# Patient Record
Sex: MALE | Race: BLACK OR AFRICAN AMERICAN | NOT HISPANIC OR LATINO | Employment: OTHER | URBAN - METROPOLITAN AREA
[De-identification: names, ages, dates, MRNs, and addresses within clinical notes are randomized per-mention and may not be internally consistent; named-entity substitution may affect disease eponyms.]

---

## 2024-04-09 ENCOUNTER — APPOINTMENT (OUTPATIENT)
Dept: PRIMARY CARE | Facility: CLINIC | Age: 59
End: 2024-04-09
Payer: MEDICARE

## 2024-09-13 ENCOUNTER — APPOINTMENT (OUTPATIENT)
Dept: OPHTHALMOLOGY | Facility: CLINIC | Age: 59
End: 2024-09-13
Payer: MEDICARE

## 2025-05-05 ENCOUNTER — APPOINTMENT (OUTPATIENT)
Dept: RADIOLOGY | Facility: HOSPITAL | Age: 60
DRG: 683 | End: 2025-05-05
Payer: MEDICARE

## 2025-05-05 ENCOUNTER — CLINICAL SUPPORT (OUTPATIENT)
Dept: EMERGENCY MEDICINE | Facility: HOSPITAL | Age: 60
DRG: 683 | End: 2025-05-05
Payer: MEDICARE

## 2025-05-05 ENCOUNTER — HOSPITAL ENCOUNTER (INPATIENT)
Facility: HOSPITAL | Age: 60
LOS: 5 days | Discharge: HOME | DRG: 683 | End: 2025-05-10
Attending: STUDENT IN AN ORGANIZED HEALTH CARE EDUCATION/TRAINING PROGRAM | Admitting: INTERNAL MEDICINE
Payer: MEDICARE

## 2025-05-05 DIAGNOSIS — I95.89 HYPOTENSION DUE TO HYPOVOLEMIA: ICD-10-CM

## 2025-05-05 DIAGNOSIS — R93.3 ABNORMAL FINDINGS ON DIAGNOSTIC IMAGING OF OTHER PARTS OF DIGESTIVE TRACT: ICD-10-CM

## 2025-05-05 DIAGNOSIS — K62.89 RECTAL MASS: ICD-10-CM

## 2025-05-05 DIAGNOSIS — N17.9 ACUTE RENAL FAILURE, UNSPECIFIED ACUTE RENAL FAILURE TYPE: Primary | ICD-10-CM

## 2025-05-05 DIAGNOSIS — E86.1 HYPOTENSION DUE TO HYPOVOLEMIA: ICD-10-CM

## 2025-05-05 DIAGNOSIS — R53.1 WEAKNESS: ICD-10-CM

## 2025-05-05 LAB
ACANTHOCYTES BLD QL SMEAR: ABNORMAL
ALBUMIN SERPL BCP-MCNC: 3.5 G/DL (ref 3.4–5)
ALBUMIN SERPL BCP-MCNC: 3.8 G/DL (ref 3.4–5)
ALP SERPL-CCNC: 175 U/L (ref 33–120)
ALT SERPL W P-5'-P-CCNC: 6 U/L (ref 10–52)
AMPHETAMINES UR QL SCN: NORMAL
ANION GAP BLDV CALCULATED.4IONS-SCNC: 26 MMOL/L (ref 10–25)
ANION GAP SERPL CALC-SCNC: 29 MMOL/L (ref 10–20)
ANION GAP SERPL CALC-SCNC: 33 MMOL/L (ref 10–20)
APPEARANCE UR: ABNORMAL
AST SERPL W P-5'-P-CCNC: 6 U/L (ref 9–39)
ATRIAL RATE: 124 BPM
B-OH-BUTYR SERPL-SCNC: 1.98 MMOL/L (ref 0.02–0.27)
BARBITURATES UR QL SCN: NORMAL
BASE EXCESS BLDV CALC-SCNC: -11.6 MMOL/L (ref -2–3)
BASOPHILS # BLD MANUAL: 0 X10*3/UL (ref 0–0.1)
BASOPHILS NFR BLD MANUAL: 0 %
BENZODIAZ UR QL SCN: NORMAL
BILIRUB SERPL-MCNC: 0.5 MG/DL (ref 0–1.2)
BILIRUB UR STRIP.AUTO-MCNC: NEGATIVE MG/DL
BNP SERPL-MCNC: 54 PG/ML (ref 0–99)
BODY TEMPERATURE: 37 DEGREES CELSIUS
BUN SERPL-MCNC: 180 MG/DL (ref 6–23)
BUN SERPL-MCNC: 203 MG/DL (ref 6–23)
BURR CELLS BLD QL SMEAR: ABNORMAL
BZE UR QL SCN: NORMAL
CA-I BLDV-SCNC: 1.14 MMOL/L (ref 1.1–1.33)
CALCIUM SERPL-MCNC: 10.1 MG/DL (ref 8.6–10.6)
CALCIUM SERPL-MCNC: 9.5 MG/DL (ref 8.6–10.6)
CANNABINOIDS UR QL SCN: NORMAL
CARDIAC TROPONIN I PNL SERPL HS: 26 NG/L (ref 0–53)
CARDIAC TROPONIN I PNL SERPL HS: 29 NG/L (ref 0–53)
CHLORIDE BLDV-SCNC: 94 MMOL/L (ref 98–107)
CHLORIDE SERPL-SCNC: 93 MMOL/L (ref 98–107)
CHLORIDE SERPL-SCNC: 96 MMOL/L (ref 98–107)
CHLORIDE UR-SCNC: <15 MMOL/L
CHLORIDE/CREATININE (MMOL/G) IN URINE: NORMAL
CK SERPL-CCNC: 77 U/L (ref 0–325)
CO2 SERPL-SCNC: 12 MMOL/L (ref 21–32)
CO2 SERPL-SCNC: 12 MMOL/L (ref 21–32)
COLOR UR: ABNORMAL
CREAT SERPL-MCNC: 6.51 MG/DL (ref 0.5–1.3)
CREAT SERPL-MCNC: 7.93 MG/DL (ref 0.5–1.3)
CREAT UR-MCNC: 139.1 MG/DL (ref 20–370)
CREAT UR-MCNC: 139.1 MG/DL (ref 20–370)
EGFRCR SERPLBLD CKD-EPI 2021: 7 ML/MIN/1.73M*2
EGFRCR SERPLBLD CKD-EPI 2021: 9 ML/MIN/1.73M*2
EOSINOPHIL # BLD MANUAL: 0.1 X10*3/UL (ref 0–0.7)
EOSINOPHIL NFR BLD MANUAL: 0.8 %
ERYTHROCYTE [DISTWIDTH] IN BLOOD BY AUTOMATED COUNT: 15.8 % (ref 11.5–14.5)
EST. AVERAGE GLUCOSE BLD GHB EST-MCNC: 111 MG/DL
FENTANYL+NORFENTANYL UR QL SCN: NORMAL
GLUCOSE BLDV-MCNC: 111 MG/DL (ref 74–99)
GLUCOSE SERPL-MCNC: 125 MG/DL (ref 74–99)
GLUCOSE SERPL-MCNC: 92 MG/DL (ref 74–99)
GLUCOSE UR STRIP.AUTO-MCNC: NORMAL MG/DL
HAPTOGLOB SERPL NEPH-MCNC: 389 MG/DL (ref 30–200)
HBA1C MFR BLD: 5.5 % (ref ?–5.7)
HCO3 BLDV-SCNC: 13.3 MMOL/L (ref 22–26)
HCT VFR BLD AUTO: 34.9 % (ref 41–52)
HCT VFR BLD EST: 38 % (ref 41–52)
HGB BLD-MCNC: 12.1 G/DL (ref 13.5–17.5)
HGB BLDV-MCNC: 12.8 G/DL (ref 13.5–17.5)
HOLD SPECIMEN: 293
HYALINE CASTS #/AREA URNS AUTO: ABNORMAL /LPF
IMM GRANULOCYTES # BLD AUTO: 0.63 X10*3/UL (ref 0–0.7)
IMM GRANULOCYTES NFR BLD AUTO: 5.3 % (ref 0–0.9)
KETONES UR STRIP.AUTO-MCNC: NEGATIVE MG/DL
LACTATE BLDV-SCNC: 1.2 MMOL/L (ref 0.4–2)
LDH SERPL L TO P-CCNC: 446 U/L (ref 84–246)
LEUKOCYTE ESTERASE UR QL STRIP.AUTO: NEGATIVE
LIPASE SERPL-CCNC: 42 U/L (ref 9–82)
LYMPHOCYTES # BLD MANUAL: 0.45 X10*3/UL (ref 1.2–4.8)
LYMPHOCYTES NFR BLD MANUAL: 3.8 %
MAGNESIUM SERPL-MCNC: 2.72 MG/DL (ref 1.6–2.4)
MAGNESIUM SERPL-MCNC: 3.1 MG/DL (ref 1.6–2.4)
MCH RBC QN AUTO: 27.1 PG (ref 26–34)
MCHC RBC AUTO-ENTMCNC: 34.7 G/DL (ref 32–36)
MCV RBC AUTO: 78 FL (ref 80–100)
METAMYELOCYTES # BLD MANUAL: 0.1 X10*3/UL
METAMYELOCYTES NFR BLD MANUAL: 0.8 %
METHADONE UR QL SCN: NORMAL
MONOCYTES # BLD MANUAL: 0.64 X10*3/UL (ref 0.1–1)
MONOCYTES NFR BLD MANUAL: 5.4 %
MYELOCYTES # BLD MANUAL: 0.18 X10*3/UL
MYELOCYTES NFR BLD MANUAL: 1.5 %
NEUTS SEG # BLD MANUAL: 10.44 X10*3/UL (ref 1.2–7)
NEUTS SEG NFR BLD MANUAL: 87.7 %
NITRITE UR QL STRIP.AUTO: NEGATIVE
NRBC BLD-RTO: 0 /100 WBCS (ref 0–0)
OPIATES UR QL SCN: NORMAL
OXYCODONE+OXYMORPHONE UR QL SCN: NORMAL
OXYHGB MFR BLDV: 79.4 % (ref 45–75)
P AXIS: 32 DEGREES
P OFFSET: 205 MS
P ONSET: 153 MS
PCO2 BLDV: 27 MM HG (ref 41–51)
PCP UR QL SCN: NORMAL
PH BLDV: 7.3 PH (ref 7.33–7.43)
PH UR STRIP.AUTO: 5 [PH]
PHOSPHATE SERPL-MCNC: 10 MG/DL (ref 2.5–4.9)
PLATELET # BLD AUTO: 519 X10*3/UL (ref 150–450)
PO2 BLDV: 55 MM HG (ref 35–45)
POTASSIUM BLDV-SCNC: 5.5 MMOL/L (ref 3.5–5.3)
POTASSIUM SERPL-SCNC: 4.7 MMOL/L (ref 3.5–5.3)
POTASSIUM SERPL-SCNC: 5.2 MMOL/L (ref 3.5–5.3)
POTASSIUM UR-SCNC: 17 MMOL/L
POTASSIUM/CREAT UR-RTO: 12 MMOL/G CREAT
PR INTERVAL: 128 MS
PROT SERPL-MCNC: 7.8 G/DL (ref 6.4–8.2)
PROT SERPL-MCNC: 9.5 G/DL (ref 6.4–8.2)
PROT UR STRIP.AUTO-MCNC: ABNORMAL MG/DL
PROT UR-ACNC: 71 MG/DL (ref 5–25)
Q ONSET: 217 MS
QRS COUNT: 20 BEATS
QRS DURATION: 90 MS
QT INTERVAL: 314 MS
QTC CALCULATION(BAZETT): 451 MS
QTC FREDERICIA: 399 MS
R AXIS: -45 DEGREES
RBC # BLD AUTO: 4.46 X10*6/UL (ref 4.5–5.9)
RBC # UR STRIP.AUTO: ABNORMAL MG/DL
RBC #/AREA URNS AUTO: ABNORMAL /HPF
RBC MORPH BLD: ABNORMAL
SAO2 % BLDV: 81 % (ref 45–75)
SODIUM BLDV-SCNC: 128 MMOL/L (ref 136–145)
SODIUM SERPL-SCNC: 132 MMOL/L (ref 136–145)
SODIUM SERPL-SCNC: 133 MMOL/L (ref 136–145)
SODIUM UR-SCNC: 53 MMOL/L
SODIUM/CREAT UR-RTO: 38 MMOL/G CREAT
SP GR UR STRIP.AUTO: 1.02
T AXIS: 86 DEGREES
T OFFSET: 374 MS
TOTAL CELLS COUNTED BLD: 130
URATE SERPL-MCNC: 21.4 MG/DL (ref 4–7.5)
UREA/CREAT UR-SRTO: 5.2 G/G CREAT
UROBILINOGEN UR STRIP.AUTO-MCNC: NORMAL MG/DL
UUN UR-MCNC: 724 MG/DL
VENTRICULAR RATE: 124 BPM
WBC # BLD AUTO: 11.9 X10*3/UL (ref 4.4–11.3)
WBC #/AREA URNS AUTO: ABNORMAL /HPF

## 2025-05-05 PROCEDURE — 96361 HYDRATE IV INFUSION ADD-ON: CPT

## 2025-05-05 PROCEDURE — 80307 DRUG TEST PRSMV CHEM ANLYZR: CPT

## 2025-05-05 PROCEDURE — 84540 ASSAY OF URINE/UREA-N: CPT

## 2025-05-05 PROCEDURE — 1200000002 HC GENERAL ROOM WITH TELEMETRY DAILY

## 2025-05-05 PROCEDURE — 84484 ASSAY OF TROPONIN QUANT: CPT

## 2025-05-05 PROCEDURE — 2500000004 HC RX 250 GENERAL PHARMACY W/ HCPCS (ALT 636 FOR OP/ED): Mod: JZ

## 2025-05-05 PROCEDURE — 83735 ASSAY OF MAGNESIUM: CPT

## 2025-05-05 PROCEDURE — 84165 PROTEIN E-PHORESIS SERUM: CPT

## 2025-05-05 PROCEDURE — 96374 THER/PROPH/DIAG INJ IV PUSH: CPT

## 2025-05-05 PROCEDURE — 81001 URINALYSIS AUTO W/SCOPE: CPT

## 2025-05-05 PROCEDURE — 99285 EMERGENCY DEPT VISIT HI MDM: CPT | Mod: 25 | Performed by: STUDENT IN AN ORGANIZED HEALTH CARE EDUCATION/TRAINING PROGRAM

## 2025-05-05 PROCEDURE — 71045 X-RAY EXAM CHEST 1 VIEW: CPT

## 2025-05-05 PROCEDURE — 2500000001 HC RX 250 WO HCPCS SELF ADMINISTERED DRUGS (ALT 637 FOR MEDICARE OP)

## 2025-05-05 PROCEDURE — 87040 BLOOD CULTURE FOR BACTERIA: CPT

## 2025-05-05 PROCEDURE — 83615 LACTATE (LD) (LDH) ENZYME: CPT

## 2025-05-05 PROCEDURE — 83521 IG LIGHT CHAINS FREE EACH: CPT

## 2025-05-05 PROCEDURE — 36415 COLL VENOUS BLD VENIPUNCTURE: CPT

## 2025-05-05 PROCEDURE — 71045 X-RAY EXAM CHEST 1 VIEW: CPT | Performed by: STUDENT IN AN ORGANIZED HEALTH CARE EDUCATION/TRAINING PROGRAM

## 2025-05-05 PROCEDURE — 84156 ASSAY OF PROTEIN URINE: CPT

## 2025-05-05 PROCEDURE — 71250 CT THORAX DX C-: CPT | Performed by: RADIOLOGY

## 2025-05-05 PROCEDURE — 85027 COMPLETE CBC AUTOMATED: CPT

## 2025-05-05 PROCEDURE — 84300 ASSAY OF URINE SODIUM: CPT

## 2025-05-05 PROCEDURE — 82435 ASSAY OF BLOOD CHLORIDE: CPT

## 2025-05-05 PROCEDURE — 82010 KETONE BODYS QUAN: CPT

## 2025-05-05 PROCEDURE — 93005 ELECTROCARDIOGRAM TRACING: CPT

## 2025-05-05 PROCEDURE — 83880 ASSAY OF NATRIURETIC PEPTIDE: CPT

## 2025-05-05 PROCEDURE — 83690 ASSAY OF LIPASE: CPT

## 2025-05-05 PROCEDURE — 84155 ASSAY OF PROTEIN SERUM: CPT

## 2025-05-05 PROCEDURE — 74176 CT ABD & PELVIS W/O CONTRAST: CPT | Performed by: RADIOLOGY

## 2025-05-05 PROCEDURE — 85007 BL SMEAR W/DIFF WBC COUNT: CPT

## 2025-05-05 PROCEDURE — 84550 ASSAY OF BLOOD/URIC ACID: CPT

## 2025-05-05 PROCEDURE — 74176 CT ABD & PELVIS W/O CONTRAST: CPT

## 2025-05-05 PROCEDURE — 86335 IMMUNFIX E-PHORSIS/URINE/CSF: CPT

## 2025-05-05 PROCEDURE — 83036 HEMOGLOBIN GLYCOSYLATED A1C: CPT

## 2025-05-05 PROCEDURE — 83605 ASSAY OF LACTIC ACID: CPT

## 2025-05-05 PROCEDURE — 82550 ASSAY OF CK (CPK): CPT

## 2025-05-05 PROCEDURE — 83010 ASSAY OF HAPTOGLOBIN QUANT: CPT

## 2025-05-05 PROCEDURE — 84100 ASSAY OF PHOSPHORUS: CPT

## 2025-05-05 RX ORDER — ATORVASTATIN CALCIUM 40 MG/1
40 TABLET, FILM COATED ORAL NIGHTLY
Status: DISCONTINUED | OUTPATIENT
Start: 2025-05-05 | End: 2025-05-10 | Stop reason: HOSPADM

## 2025-05-05 RX ORDER — ONDANSETRON HYDROCHLORIDE 2 MG/ML
4 INJECTION, SOLUTION INTRAVENOUS ONCE
Status: COMPLETED | OUTPATIENT
Start: 2025-05-05 | End: 2025-05-05

## 2025-05-05 RX ORDER — INSULIN LISPRO 100 [IU]/ML
0-5 INJECTION, SOLUTION INTRAVENOUS; SUBCUTANEOUS
Status: DISCONTINUED | OUTPATIENT
Start: 2025-05-06 | End: 2025-05-08

## 2025-05-05 RX ORDER — ACETAMINOPHEN 325 MG/1
975 TABLET ORAL ONCE
Status: COMPLETED | OUTPATIENT
Start: 2025-05-05 | End: 2025-05-05

## 2025-05-05 RX ORDER — HEPARIN SODIUM 5000 [USP'U]/ML
5000 INJECTION, SOLUTION INTRAVENOUS; SUBCUTANEOUS EVERY 8 HOURS SCHEDULED
Status: DISCONTINUED | OUTPATIENT
Start: 2025-05-05 | End: 2025-05-10 | Stop reason: HOSPADM

## 2025-05-05 RX ORDER — SPIRONOLACTONE 50 MG/1
50 TABLET, FILM COATED ORAL
COMMUNITY
Start: 2024-11-04

## 2025-05-05 RX ORDER — DEXTROSE 50 % IN WATER (D50W) INTRAVENOUS SYRINGE
25
Status: DISCONTINUED | OUTPATIENT
Start: 2025-05-05 | End: 2025-05-08

## 2025-05-05 RX ORDER — FERROUS SULFATE 325(65) MG
1 TABLET ORAL
COMMUNITY
Start: 2024-11-04

## 2025-05-05 RX ORDER — DEXTROSE 50 % IN WATER (D50W) INTRAVENOUS SYRINGE
12.5
Status: DISCONTINUED | OUTPATIENT
Start: 2025-05-05 | End: 2025-05-08

## 2025-05-05 RX ORDER — METOPROLOL SUCCINATE 100 MG/1
100 TABLET, EXTENDED RELEASE ORAL
COMMUNITY
Start: 2024-11-04

## 2025-05-05 RX ORDER — LOSARTAN POTASSIUM 100 MG/1
100 TABLET ORAL
COMMUNITY
Start: 2024-11-04

## 2025-05-05 RX ORDER — ATORVASTATIN CALCIUM 40 MG/1
40 TABLET, FILM COATED ORAL
COMMUNITY
Start: 2024-11-04

## 2025-05-05 RX ORDER — FERROUS SULFATE 325(65) MG
65 TABLET ORAL
Status: DISCONTINUED | OUTPATIENT
Start: 2025-05-06 | End: 2025-05-10 | Stop reason: HOSPADM

## 2025-05-05 RX ORDER — ACETAMINOPHEN 500 MG
125 TABLET ORAL
COMMUNITY
Start: 2024-11-04

## 2025-05-05 RX ORDER — NAPROXEN SODIUM 220 MG/1
81 TABLET, FILM COATED ORAL DAILY
Status: DISCONTINUED | OUTPATIENT
Start: 2025-05-05 | End: 2025-05-10 | Stop reason: HOSPADM

## 2025-05-05 RX ORDER — POLYETHYLENE GLYCOL 3350 17 G/17G
17 POWDER, FOR SOLUTION ORAL DAILY
Status: DISCONTINUED | OUTPATIENT
Start: 2025-05-05 | End: 2025-05-10 | Stop reason: HOSPADM

## 2025-05-05 RX ORDER — AMOXICILLIN 250 MG
2 CAPSULE ORAL 2 TIMES DAILY
Status: DISCONTINUED | OUTPATIENT
Start: 2025-05-05 | End: 2025-05-10 | Stop reason: HOSPADM

## 2025-05-05 RX ORDER — ASPIRIN 81 MG/1
81 TABLET ORAL
COMMUNITY
Start: 2024-11-04

## 2025-05-05 RX ORDER — LORATADINE 10 MG/1
10 TABLET ORAL DAILY PRN
COMMUNITY
Start: 2024-11-04

## 2025-05-05 RX ADMIN — ACETAMINOPHEN 975 MG: 325 TABLET, FILM COATED ORAL at 13:59

## 2025-05-05 RX ADMIN — ASPIRIN 81 MG CHEWABLE TABLET 81 MG: 81 TABLET CHEWABLE at 21:36

## 2025-05-05 RX ADMIN — SENNOSIDES AND DOCUSATE SODIUM 2 TABLET: 50; 8.6 TABLET ORAL at 21:43

## 2025-05-05 RX ADMIN — SODIUM BICARBONATE 75 ML/HR: 84 INJECTION, SOLUTION INTRAVENOUS at 21:38

## 2025-05-05 RX ADMIN — ATORVASTATIN CALCIUM 40 MG: 40 TABLET, FILM COATED ORAL at 21:43

## 2025-05-05 RX ADMIN — SODIUM CHLORIDE 1000 ML: 9 INJECTION, SOLUTION INTRAVENOUS at 13:50

## 2025-05-05 RX ADMIN — ONDANSETRON 4 MG: 2 INJECTION INTRAMUSCULAR; INTRAVENOUS at 13:49

## 2025-05-05 RX ADMIN — SODIUM CHLORIDE, SODIUM LACTATE, POTASSIUM CHLORIDE, AND CALCIUM CHLORIDE 1000 ML: 600; 310; 30; 20 INJECTION, SOLUTION INTRAVENOUS at 15:48

## 2025-05-05 ASSESSMENT — ENCOUNTER SYMPTOMS
FACIAL SWELLING: 0
PALPITATIONS: 0
NAUSEA: 1
HEADACHES: 0
VOMITING: 1
CONSTIPATION: 1
APPETITE CHANGE: 1
DIZZINESS: 0
FATIGUE: 1
RECTAL PAIN: 0
CHILLS: 0
ACTIVITY CHANGE: 1
ABDOMINAL PAIN: 0
FLANK PAIN: 1
HEMATURIA: 0
DIARRHEA: 0
LIGHT-HEADEDNESS: 0
SHORTNESS OF BREATH: 0
WEAKNESS: 1
ABDOMINAL DISTENTION: 0
FEVER: 0
DYSURIA: 0
DIFFICULTY URINATING: 0

## 2025-05-05 ASSESSMENT — COLUMBIA-SUICIDE SEVERITY RATING SCALE - C-SSRS
2. HAVE YOU ACTUALLY HAD ANY THOUGHTS OF KILLING YOURSELF?: NO
6. HAVE YOU EVER DONE ANYTHING, STARTED TO DO ANYTHING, OR PREPARED TO DO ANYTHING TO END YOUR LIFE?: NO
1. IN THE PAST MONTH, HAVE YOU WISHED YOU WERE DEAD OR WISHED YOU COULD GO TO SLEEP AND NOT WAKE UP?: NO

## 2025-05-05 ASSESSMENT — PAIN - FUNCTIONAL ASSESSMENT
PAIN_FUNCTIONAL_ASSESSMENT: 0-10
PAIN_FUNCTIONAL_ASSESSMENT: 0-10

## 2025-05-05 ASSESSMENT — PAIN SCALES - GENERAL
PAINLEVEL_OUTOF10: 8
PAINLEVEL_OUTOF10: 0 - NO PAIN

## 2025-05-05 ASSESSMENT — PAIN DESCRIPTION - LOCATION: LOCATION: BACK

## 2025-05-05 ASSESSMENT — PAIN DESCRIPTION - DESCRIPTORS: DESCRIPTORS: ACHING

## 2025-05-05 NOTE — PROGRESS NOTES
Patient was handed off to me from the previous team. For full history, physical, and prior ED course, please see previous provider note prior to patient handoff. This is an addendum to the record.    Briefly, this is a 59-year-old male with past medical history of CHF (EF 20% 2021), iron deficiency anemia, HTN, and hypercholesterolemia who presents to the ED for generalized weakness at time of signout, patient was pending completion of workup and disposition.  His blood work is notable for a BUN of 203 with a creatinine of 7 concerning for acute renal failure.  He is still making urine and received 2 L of IV fluids.  We obtained a CT without contrast which shows concern significantly enlarged liver with several lesions concerning for possible malignancy.  Admit patient for further workup and management.  Patient admitted in stable condition      Throughout the ED stay, the patient was monitored and re-examined for any changes in stability or symptomatology.    Pt seen and discussed with Dr. Maris Ware DO.  Emergency Medicine PGY-3

## 2025-05-05 NOTE — ED TRIAGE NOTES
Patient presents via [facility]. Patient states he has had increased weakness for 1 week and SOB on exertion. Pt able to speak in full sentences and has decreased appetite. Kaelyn N/V & constipation. Tachycardic during triage in 130s. No resent sick contacts.

## 2025-05-05 NOTE — ED PROVIDER NOTES
CC: Weakness, Gen     HPI:  Patient is a 59-year-old male with a past medical history of CHF (EF 20% 2021), iron deficiency anemia, HTN, and hypercholesterolemia who presents to the ED for generalized weakness.  Patient has noted generalized weakness over the past week.  Notes that he has been having dyspnea on exertion.  Also noting right flank pain.  States that he has had significantly decreased p.o. secondary to nausea.  Has had multiple episodes of nonbloody emesis.  Notes that he has been intermittently constipated and had a normal bowel movement this past morning.  Denies history of abdominal surgeries.  Denied fevers, chills, chest pain, headache, trauma, falls, dysuria, and extremity pain.    Limitations to history: None  Independent historian(s): Patient  Records Reviewed: Recent available ED and inpatient notes reviewed in EMR.    PMHx/PSHx:  Per HPI.   - has no past medical history on file.  - has no past surgical history on file.    Medications:  Reviewed in EMR. See EMR for complete list of medications and doses.    Allergies:  Patient has no known allergies.    Social History:  - Tobacco:  has no history on file for tobacco use.   - Alcohol:  has no history on file for alcohol use.   - Illicit Drugs:  has no history on file for drug use.     ROS:  Per HPI.       ???????????????????????????????????????????????????????????????  Triage Vitals:  T 36.3 °C (97.3 °F)  HR (!) 124  /70  RR 16  O2 98 %      Physical Exam  Vitals and nursing note reviewed.   Constitutional:       General: He is not in acute distress.  HENT:      Head: Normocephalic and atraumatic.      Nose: Nose normal.      Mouth/Throat:      Mouth: Mucous membranes are dry.   Eyes:      Conjunctiva/sclera: Conjunctivae normal.   Cardiovascular:      Rate and Rhythm: Regular rhythm. Tachycardia present.      Pulses: Normal pulses.   Pulmonary:      Effort: Pulmonary effort is normal. No respiratory distress.      Breath sounds:  Normal breath sounds.   Abdominal:      Palpations: Abdomen is soft.      Tenderness: There is no abdominal tenderness. There is right CVA tenderness.   Skin:     General: Skin is warm.   Neurological:      General: No focal deficit present.      Mental Status: He is alert.       ???????????????????????????????????????????????????????????????  Labs:   Labs Reviewed   BLOOD GAS VENOUS FULL PANEL UNSOLICITED - Abnormal       Result Value    POCT pH, Venous 7.30 (*)     POCT pCO2, Venous 27 (*)     POCT pO2, Venous 55 (*)     POCT SO2, Venous 81 (*)     POCT Oxy Hemoglobin, Venous 79.4 (*)     POCT Hematocrit Calculated, Venous 38.0 (*)     POCT Sodium, Venous 128 (*)     POCT Potassium, Venous 5.5 (*)     POCT Chloride, Venous 94 (*)     POCT Ionized Calicum, Venous 1.14      POCT Glucose, Venous 111 (*)     POCT Lactate, Venous 1.2      POCT Base Excess, Venous -11.6 (*)     POCT HCO3 Calculated, Venous 13.3 (*)     POCT Hemoglobin, Venous 12.8 (*)     POCT Anion Gap, Venous 26.0 (*)     Patient Temperature 37.0          Imaging:   No orders to display      MDM:  Patient is a 59-year-old male with a past medical history of CHF (EF 20% 2021), iron deficiency anemia, HTN, and hypercholesterolemia who presents to the ED for generalized weakness.  Patient tachycardic with a heart rate of 124 with soft BP at 101/70.  Physical exam findings significant for dehydration and right flank TTP.  Low clinical concern for dissection, pneumothorax, SBO, acute mesenteric ischemia, and sepsis.  IV fluids, Zofran, and Tylenol ordered.  Basic labs, cardiac workup, blood cultures, UA, CXR, CT PE, and CTAP ordered.  Please see ED course and disposition for remainder care.    ED Course:  ED Course as of 05/05/25 1444   Mon May 05, 2025   1252 EKG: Rate is 124, sinus rhythm, normal axis, no interval prolongation, no st elevation or depression.  No previous EKG for comparison.  Review of EKG does not show any signs of STEMI, complete heart  block, asystole, V-fib. []   1344 Bedside cardiac POCUS significant for mildly reduced EF and no pericardial effusion.  Thoracic POCUS without significant B-lines. IVC collapsible. []   1350 XR chest 1 view  CXR without an acute cardiopulmonary process. []   1419 CBC with mild leukocytosis and anemia. []      ED Course User Index  [] Bakari Gardner MD       Social Determinants Limiting Care:  None identified    Disposition:  Patient signed out to Dr. Ware in stable condition pending labs, imaging, and reevaluation.  Patient's most likely disposition is admission.    Bakari Gardner MD   Emergency Medicine PGY-3  [facility]    Comment: Please note this report has been produced using speech recognition software and may contain errors related to that system including errors in grammar, punctuation, and spelling as well as words and phrases that may be inappropriate.  If there are any questions or concerns please feel free to contact the dictating provider for clarification.    Procedures ? SmartLinks last updated 5/5/2025 1:13 PM        Bakari Gardner MD  Resident  05/05/25 7334     by the FDA; however such approval is not necessary.     HAPTOGLOBIN - Abnormal    Haptoglobin 389 (*)    LACTATE DEHYDROGENASE - Abnormal     (*)    KAPPA/LAMBDA FREE LIGHT CHAIN, SERUM - Abnormal    Ig [place] Free Light Chain 13.60 (*)     Ig Lambda Free Light Chain 9.72 (*)     Kappa/Lambda Ratio 1.40      Narrative:     Undetected antigen excess is a rare event but cannot be  excluded. If these free light chain results do not agree  with other clinical or laboratory findings, or if the  sample is from a patient that has previously demonstrated  antigen excess, the result must be checked by retesting  at a higher sample dilution. Results should always be  interpreted in conjunction with other laboratory tests  and clinical evidence; any anomalies should be discussed  with the testing laboratory.   SERUM PROTEIN ELECTROPHORESIS + IMMUNOFIXATION - Abnormal    Albumin 3.0 (*)     Alpha 1 Globulin 0.7 (*)     Alpha 2 Globulin 1.2 (*)     Beta Globulin 0.9      Gamma 1.9 (*)     Protein Electrophoresis Comment        Value: Hypoalbuminemia.     Increase in polyclonal gamma globulins.    Increased level of alpha 1 and 2 globulins.         Immunofixation Comment No monoclonal protein detected by immunofixation.      Path Review - Serum Protein Electrophoresis         Value: Reviewed and approved by MILAN BARRETO on 5/9/25 at 10:10 AM.        Path Review - Serum Immunofixation        Value: Reviewed and approved by MILAN BARRETO on 5/9/25 at 10:10 AM.       PROTEIN, URINE RANDOM UPE - Abnormal    Total Protein, Urine Random 71 (*)    URIC ACID - Abnormal    Uric Acid 21.4 (*)    RENAL FUNCTION PANEL - Abnormal    Glucose 136 (*)     Sodium 133 (*)     Potassium 6.1 (*)     Chloride 98      Bicarbonate 16 (*)     Anion Gap 25 (*)     Urea Nitrogen 187 (*)     Creatinine 5.74 (*)     eGFR 11 (*)     Calcium 8.7      Phosphorus 8.7 (*)     Albumin 3.0 (*)    RENAL FUNCTION PANEL - Abnormal    Glucose 138 (*)      Sodium 132 (*)     Potassium 6.6 (*)     Chloride 97 (*)     Bicarbonate 18 (*)     Anion Gap 24 (*)     Urea Nitrogen 176 (*)     Creatinine 5.23 (*)     eGFR 12 (*)     Calcium 8.7      Phosphorus 8.6 (*)     Albumin 3.1 (*)    BLOOD GAS VENOUS FULL PANEL - Abnormal    POCT pH, Venous 7.40      POCT pCO2, Venous 31 (*)     POCT pO2, Venous 60 (*)     POCT SO2, Venous 89 (*)     POCT Oxy Hemoglobin, Venous 86.8 (*)     POCT Hematocrit Calculated, Venous 34.0 (*)     POCT Sodium, Venous 133 (*)     POCT Potassium, Venous 4.5      POCT Chloride, Venous 100      POCT Ionized Calicum, Venous 1.17      POCT Glucose, Venous 138 (*)     POCT Lactate, Venous 1.0      POCT Base Excess, Venous -4.7 (*)     POCT HCO3 Calculated, Venous 19.2 (*)     POCT Hemoglobin, Venous 11.4 (*)     POCT Anion Gap, Venous 18.0      Patient Temperature 37.0      FiO2 21     PROSTATE SPECIFIC ANTIGEN - Abnormal    Prostate Specific AG 5.47 (*)     Narrative:     The FDA requires that the method used for PSA assay be reported to the physician. Values obtained with different assay methods must not be used interchangeably. This test was performed at [facility] using [employer] PSA method, which is a sandwich immunoassay using chemiluminescence for quantitation. The assay is approved for measurement of prostate-specific antigen (PSA) in serum and may be used in conjunction with a digital rectal examination in men 50 years and older as an aid in the detection of prostate cancer. 5 Alpha-reductase inhibitors (e.g., Proscar, Finasteride, Avodart, Dutasteride, and Sonali) for the treatment of BPH have been shown to lower PSA levels by an average of 50% after 6 months of treatment.        CANCER ANTIGEN 19-9 - Abnormal    Cancer AG 19-9 39.67 (*)     Narrative:     CA 19-9 testing is performed by chemiluminescent  immunoassay using the [employer]. Values obtained with different analytic methods cannot be  used  interchangeably.    Serum CA 19-9 measurement is indicated for the serial measurement of CA 19-9 to aid in the management of patients diagnosed with cancers of the exocrine pancreas. This assay is not intended for screening or diagnosis of cancer in the general population. The results must not be used as the sole means for clinical diagnosis or patient management decisions.    Patients known to be genotypically negative for the [place] blood group antigens will be unable to produce CA 19-9 antigen, even in malignant tissue. Phenotyping for the presence of the [place] antigen may be insufficient to  detect true [place] antigen negative individuals.    The results must not be used as the sole means for clinical diagnosis or patient management decisions.   RENAL FUNCTION PANEL - Abnormal    Glucose 133 (*)     Sodium 134 (*)     Potassium 4.2      Chloride 98      Bicarbonate 20 (*)     Anion Gap 20      Urea Nitrogen 165 (*)     Creatinine 4.44 (*)     eGFR 14 (*)     Calcium 9.6      Phosphorus 6.5 (*)     Albumin 3.3 (*)    MAGNESIUM - Abnormal    Magnesium 2.52 (*)    LACTATE DEHYDROGENASE - Abnormal     (*)    URIC ACID - Abnormal    Uric Acid 20.1 (*)    URIC ACID - Abnormal    Uric Acid 13.2 (*)    LACTATE DEHYDROGENASE - Abnormal     (*)    RENAL FUNCTION PANEL - Abnormal    Glucose 185 (*)     Sodium 134 (*)     Potassium 3.9      Chloride 100      Bicarbonate 21      Anion Gap 17      Urea Nitrogen 157 (*)     Creatinine 3.57 (*)     eGFR 19 (*)     Calcium 9.3      Phosphorus 5.3 (*)     Albumin 3.2 (*)    CBC WITH AUTO DIFFERENTIAL - Abnormal    WBC 9.1      nRBC 0.0      RBC 3.20 (*)     Hemoglobin 8.5 (*)     Hematocrit 26.0 (*)     MCV 81      MCH 26.6      MCHC 32.7      RDW 16.0 (*)     Platelets 368      Immature Granulocytes %, Automated 5.1 (*)     Immature Granulocytes Absolute, Automated 0.47      Narrative:     The previously reported component Neutrophils % is no longer being  reported.  The previously reported component Lymphocytes % is no longer being reported.  The previously reported component Monocytes % is no longer being reported.  The previously   reported component Eosinophils % is no longer being reported.  The previously reported component Basophils % is no longer being reported.  The previously reported component Absolute Neutrophils is no longer being reported.  The previously reported   component Absolute Lymphocytes is no longer being reported.  The previously reported component Absolute Monocytes is no longer being reported.  The previously reported component Absolute Eosinophils is no longer being reported.  The previously reported   component Absolute Basophils is no longer being reported.   RENAL FUNCTION PANEL - Abnormal    Glucose 86      Sodium 139      Potassium 4.1      Chloride 112 (*)     Bicarbonate 19 (*)     Anion Gap 12      Urea Nitrogen 99 (*)     Creatinine 1.91 (*)     eGFR 40 (*)     Calcium 6.9 (*)     Phosphorus 3.4      Albumin 2.4 (*)    RENAL FUNCTION PANEL - Abnormal    Glucose 96      Sodium 139      Potassium 4.4      Chloride 107      Bicarbonate 22      Anion Gap 14      Urea Nitrogen 92 (*)     Creatinine 1.65 (*)     eGFR 48 (*)     Calcium 8.9      Phosphorus 3.2      Albumin 3.3 (*)    URIC ACID - Abnormal    Uric Acid 1.9 (*)    IRON AND TIBC - Abnormal    Iron 48      UIBC 103 (*)     TIBC 151 (*)     % Saturation 32     FERRITIN - Abnormal    Ferritin 2,951 (*)    VITAMIN B12 - Abnormal    Vitamin B12 1,172 (*)    RENAL FUNCTION PANEL - Abnormal    Glucose 104 (*)     Sodium 141      Potassium 4.6      Chloride 107      Bicarbonate 24      Anion Gap 15      Urea Nitrogen 67 (*)     Creatinine 1.32 (*)     eGFR 62      Calcium 8.8      Phosphorus 2.5      Albumin 3.1 (*)    URIC ACID - Abnormal    Uric Acid <1.5 (*)    CBC WITH AUTO DIFFERENTIAL - Abnormal    WBC 11.0      nRBC 0.0      RBC 3.74 (*)     Hemoglobin 10.0 (*)     Hematocrit  31.2 (*)     MCV 83      MCH 26.7      MCHC 32.1      RDW 16.1 (*)     Platelets 449      Neutrophils % 80.1      Immature Granulocytes %, Automated 4.3 (*)     Lymphocytes % 10.2      Monocytes % 4.4      Eosinophils % 0.7      Basophils % 0.3      Neutrophils Absolute 8.84 (*)     Immature Granulocytes Absolute, Automated 0.47      Lymphocytes Absolute 1.13 (*)     Monocytes Absolute 0.48      Eosinophils Absolute 0.08      Basophils Absolute 0.03     MAGNESIUM - Abnormal    Magnesium 1.57 (*)    RENAL FUNCTION PANEL - Abnormal    Glucose 201 (*)     Sodium 138      Potassium 4.4      Chloride 106      Bicarbonate 23      Anion Gap 13      Urea Nitrogen 19      Creatinine 1.11      eGFR 76      Calcium 8.7      Phosphorus 1.8 (*)     Albumin 2.9 (*)    URIC ACID - Abnormal    Uric Acid <1.5 (*)    POCT GLUCOSE - Abnormal    POCT Glucose 134 (*)    POCT GLUCOSE - Abnormal    POCT Glucose 190 (*)    POCT GLUCOSE - Abnormal    POCT Glucose 112 (*)    POCT GLUCOSE - Abnormal    POCT Glucose 105 (*)    POCT GLUCOSE - Abnormal    POCT Glucose 103 (*)    POCT GLUCOSE - Abnormal    POCT Glucose 103 (*)    BLOOD GAS VENOUS FULL PANEL UNSOLICITED - Abnormal    POCT pH, Venous 7.30 (*)     POCT pCO2, Venous 27 (*)     POCT pO2, Venous 55 (*)     POCT SO2, Venous 81 (*)     POCT Oxy Hemoglobin, Venous 79.4 (*)     POCT Hematocrit Calculated, Venous 38.0 (*)     POCT Sodium, Venous 128 (*)     POCT Potassium, Venous 5.5 (*)     POCT Chloride, Venous 94 (*)     POCT Ionized Calicum, Venous 1.14      POCT Glucose, Venous 111 (*)     POCT Lactate, Venous 1.2      POCT Base Excess, Venous -11.6 (*)     POCT HCO3 Calculated, Venous 13.3 (*)     POCT Hemoglobin, Venous 12.8 (*)     POCT Anion Gap, Venous 26.0 (*)     Patient Temperature 37.0     MANUAL DIFFERENTIAL - Abnormal    Neutrophils %, Manual 87.7      Lymphocytes %, Manual 3.8      Monocytes %, Manual 5.4      Eosinophils %, Manual 0.8      Basophils %, Manual 0.0       Metamyelocytes %, Manual 0.8      Myelocytes %, Manual 1.5      Seg Neutrophils Absolute, Manual 10.44 (*)     Lymphocytes Absolute, Manual 0.45 (*)     Monocytes Absolute, Manual 0.64      Eosinophils Absolute, Manual 0.10      Basophils Absolute, Manual 0.00      Metamyelocytes Absolute, Manual 0.10      Myelocytes Absolute, Manual 0.18      Total Cells Counted 130      RBC Morphology See Below      [place] Cells Many      Acanthocytes Few     URINALYSIS MICROSCOPIC WITH REFLEX CULTURE - Abnormal    WBC, Urine 6-10 (*)     RBC, Urine 1-2      Hyaline Casts, Urine 3+ (*)    MANUAL DIFFERENTIAL - Abnormal    Neutrophils %, Manual 84.4      Lymphocytes %, Manual 7.8      Monocytes %, Manual 4.3      Eosinophils %, Manual 0.9      Basophils %, Manual 0.9      Myelocytes %, Manual 1.7      Seg Neutrophils Absolute, Manual 7.68 (*)     Lymphocytes Absolute, Manual 0.71 (*)     Monocytes Absolute, Manual 0.39      Eosinophils Absolute, Manual 0.08      Basophils Absolute, Manual 0.08      Myelocytes Absolute, Manual 0.15      Total Cells Counted 115      RBC Morphology See Below      Tam Cells Many      Acanthocytes Few     BLOOD CULTURE - Normal    Blood Culture No growth at 4 days -  FINAL REPORT     BLOOD CULTURE - Normal    Blood Culture No growth at 4 days -  FINAL REPORT     B-TYPE NATRIURETIC PEPTIDE - Normal    BNP 54      Narrative:        <100 pg/mL - Heart failure unlikely  100-299 pg/mL - Intermediate probability of acute heart                  failure exacerbation. Correlate with clinical                  context and patient history.    >=300 pg/mL - Heart Failure likely. Correlate with clinical                  context and patient history.     Biotin interference may cause falsely decreased results. Patients taking a Biotin dose of up to 5 mg/day should refrain from taking Biotin for 24 hours before sample  collection. Providers may contact their local laboratory for further information.   LIPASE - Normal     Lipase 42      Narrative:     Venipuncture immediately after or during the administration of Metamizole may lead to falsely low results. Testing should be performed immediately prior to Metamizole dosing.   SERIAL TROPONIN-INITIAL - Normal    Troponin I, High Sensitivity ([facility]) 26      Narrative:     Less than 99th percentile of normal range cutoff-  Female and children under 18 years old <35 ng/L; Male <54 ng/L: Negative  Repeat testing should be performed if clinically indicated.     Female and children under 18 years old  ng/L; Male  ng/L:  Consistent with possible cardiac damage and possible increased clinical   risk. Serial measurements may help to assess extent of myocardial damage.     >120 ng/L: Consistent with cardiac damage, increased clinical risk and  myocardial infarction. Serial measurements may help assess extent of   myocardial damage.      NOTE: Children less than 1 year old may have higher baseline troponin   levels and results should be interpreted in conjunction with the overall   clinical context.    NOTE: Troponin I testing is performed using a different   testing methodology at [facility] than at other   [facility]. Direct result comparisons should only   be made within the same method.     SERIAL TROPONIN, 1 HOUR - Normal    Troponin I, High Sensitivity ([facility]) 29      Narrative:     Less than 99th percentile of normal range cutoff-  Female and children under 18 years old <35 ng/L; Male <54 ng/L: Negative  Repeat testing should be performed if clinically indicated.     Female and children under 18 years old  ng/L; Male  ng/L:  Consistent with possible cardiac damage and possible increased clinical   risk. Serial measurements may help to assess extent of myocardial damage.     >120 ng/L: Consistent with cardiac damage, increased clinical risk and  myocardial infarction. Serial measurements may help assess extent of   myocardial damage.      NOTE:  Children less than 1 year old may have higher baseline troponin   levels and results should be interpreted in conjunction with the overall   clinical context.    NOTE: Troponin I testing is performed using a different   testing methodology at [facility] than at other   [facility]. Direct result comparisons should only   be made within the same method.     DRUG SCREEN, URINE WITH REFLEX TO CONFIRMATION - Normal    Amphetamine Screen, Urine Presumptive Negative      Barbiturate Screen, Urine Presumptive Negative      Benzodiazepines Screen, Urine Presumptive Negative      Cannabinoid Screen, Urine Presumptive Negative      Cocaine Metabolite Screen, Urine Presumptive Negative      Fentanyl Screen, Urine Presumptive Negative      Opiate Screen, Urine Presumptive Negative      Oxycodone Screen, Urine Presumptive Negative      PCP Screen, Urine Presumptive Negative      Methadone Screen, Urine Presumptive Negative      Narrative:     Drug screen results are presumptive and should not be used to assess   compliance with prescribed medication. Definitive confirmatory drug testing   has been added to this sample for any positive screen result and will be  reported separately.     Toxicology screening results are reported qualitatively. The concentration must  be greater than or equal to the cutoff to be reported as positive. The concentration   at which the screening test can detect an individual drug or metabolite varies.   The absence of expected drug(s) and/or drug metabolite(s) may indicate non-compliance,   inappropriate timing of specimen collection relative to drug administration, poor drug   absorption, diluted/adulterated urine, or limitations of testing. For medical purposes   only; not valid for forensic use.    Interpretive questions should be directed to the laboratory medical directors.   CREATINE KINASE - Normal    Creatine Kinase 77     PROTEIN, TOTAL SPE - Normal    Total Protein 7.8      GLUCOSE 6 PHOSPHATE DEHYDROGENASE QUALITATIVE SCREEN - Normal    G6PD, Qual Normal     MAGNESIUM - Normal    Magnesium 2.31     MAGNESIUM - Normal    Magnesium 1.71     MAGNESIUM - Normal    Magnesium 1.87     RETICULOCYTES - Normal    Retic % 1.2      Retic Absolute 0.039      Reticulocyte Hemoglobin 30      Immature Retic fraction 11.3     FOLATE - Normal    Folate, Serum >24.0      Narrative:     Low           <3.4  Borderline 3.4-5.0  Normal        >5.0    Patients receiving more than 5 mg/day of biotin may have interference in test results. A sample should be taken no sooner than eight hours after previous dose. Contact the testing laboratory for additional information.    POCT GLUCOSE - Normal    POCT Glucose 96     TROPONIN SERIES- (INITIAL, 1 HR)    Narrative:     The following orders were created for panel order Troponin I Series, High Sensitivity (0, 1 HR).  Procedure                               Abnormality         Status                     ---------                               -----------         ------                     Troponin I, High Sensiti...[[id number]]  Normal              Final result               Troponin, High Sensitivi...[[id number]]  Normal              Final result                 Please view results for these tests on the individual orders.   URINALYSIS WITH REFLEX CULTURE AND MICROSCOPIC    Narrative:     The following orders were created for panel order Urinalysis with Reflex Culture and Microscopic.  Procedure                               Abnormality         Status                     ---------                               -----------         ------                     Urinalysis with Reflex C...[[id number]]  Abnormal            Final result               Extra Urine Gray Tube[[id number]]                            Final result                 Please view results for these tests on the individual orders.   EXTRA URINE GRAY TUBE    Extra Tube 293     ELECTROLYTE PANEL, URINE     Sodium, Urine Random 53      Sodium/Creatinine Ratio 38      Potassium, Urine Random 17      Potassium/Creatinine Ratio 12      Chloride, Urine Random <15      Chloride/Creatinine Ratio        Creatinine, Urine Random 139.1     UREA NITROGEN, URINE RANDOM    Urea Nitrogen, Urine Random 724      Creatinine, Urine Random 139.1      Urea Nitrogen/Creatinine Ratio 5.2     HEMOGLOBIN A1C    Hemoglobin A1C 5.5      Estimated Average Glucose 111      Narrative:     Diagnosis of Diabetes-Adults  Non-Diabetic: < or = 5.6%  Increased risk for developing diabetes: 5.7-6.4%  Diagnostic of diabetes: > or = 6.5%       PROTEIN ELECTROPHORESIS + IMMUNOFIXATION, SERUM    Narrative:     The following orders were created for panel order Serum Protein Electrophoresis + Immunofixation.  Procedure                               Abnormality         Status                     ---------                               -----------         ------                     Protein, Total[[id number]]               Normal              Final result               Serum Protein Electropho...[[id number]]  Abnormal            Final result                 Please view results for these tests on the individual orders.   PROTEIN ELECTROPHORESIS + IMMUNOFIXATION, URINE    Narrative:     The following orders were created for panel order Urine Protein Electrophoresis + Immunofixation.  Procedure                               Abnormality         Status                     ---------                               -----------         ------                     Protein, Urine Random[[id number]]        Abnormal            Final result               Urine Protein Electropho...[[id number]]                      Final result                 Please view results for these tests on the individual orders.   URINE PROTEIN ELECTROPHORESIS + IMMUNOFIXATION    Albumin % 20.6      Alpha 1 Globulin % 16.3      Alpha 2 Globulin % 17.6      Beta Globulin % 15.4      Gamma Globulin % 30.1       Urine Electrophoresis Comment Marked proteinuria.          Path Review-Urine Protein Electrophoresis         Value: Reviewed and approved by MILAN BARRETO on 5/8/25 at 9:46 PM.        Path Review - Urine Immunofixation        Value: Reviewed and approved by MILAN BARRETO on 5/8/25 at 9:46 PM.        Immunofixation Comment No monoclonal protein detected by immunofixation.     CARCINOEMBRYONIC ANTIGEN    Carcinoembryonic AG <0.5      Narrative:      REF VALUES   NONSMOKERS 0-2.5   SMOKERS    0-5.0      CEA testing is performed by chemiluminescent immunoassay  using the Siemens [employer]ell[employer]. Values obtained with  different analytic methods cannot be used interchangeably.    Serum CEA measurement is intended for use as an aid in the management of patients previously treated for cancer. This assay is not intended for screening or diagnosis of cancer in the general population. The results must not  be used as the sole means for clinical diagnosis or patient management decisions.   SURGICAL PATHOLOGY EXAM        Imaging:   MR abdomen w and wo IV contrast   Preliminary Result   1. Innumerable metastatic lesions involving the majority of the liver   (>30 lesions).   2. Innumerable osseous metastatic lesions involving the visualized   lumbar and thoracic vertebrae.   3. Several lesions involving the bilateral kidneys which may   represent metastatic disease, however multiple primary renal   neoplasms can not be entirely excluded which can be seen in syndromes   such as hereditary papillary renal cell carcinoma (HPRC), genetic   testing could be considered.        I personally reviewed the image(s) / study and I agree with the   findings as stated by Rhoda Santoyo MD. This study was interpreted at   [facility], [place], [place].        MACRO:   None             Dictation workstation:   [id number]      Transthoracic Echo Complete   Final Result      CT chest abdomen pelvis wo IV contrast   Final Result   1.  3.0 x 3.0 cm soft tissue mass abutting the left side of the rectum   with perirectal adenopathy, concerning for malignancy with metastatic   disease. PET/CT can be obtained for further evaluation.   2. Hepatomegaly. Multiple ill-defined hypodense masses at the liver,   consistent with metastatic disease.   3. Diffuse sclerotic appearance of T8 vertebral body. Sclerotic   lesions at the ribs with expansile lesion at the posterior left 5th   rib. Lytic and sclerotic lesions at the spine and at the pelvis.   Findings are probably representing osseous metastatic disease.   4. Mild colonic diverticulosis.   5. Prostatomegaly.   6. Small hiatal hernia.   7. Cardiomegaly. Dilated main pulmonary artery, please correlate for   pulmonary arterial hypertension.   8. Mild right basilar atelectasis.                       MACRO:        None.        Signed by: Amanda Tracy 5/5/2025 7:25 PM   Dictation workstation:   [id number]      XR chest 1 view   Final Result   1.  No evidence of acute cardiopulmonary process. Low lung volumes.             MACRO:   None        Signed by: Chente Samuel 5/5/2025 1:45 PM   Dictation workstation:   [id number]         MDM:  Patient is a 59-year-old male with a past medical history of CHF (EF 20% 2021), iron deficiency anemia, HTN, and hypercholesterolemia who presents to the ED for generalized weakness.  Patient tachycardic with a heart rate of 124 with soft BP at 101/70.  Physical exam findings significant for dehydration and right flank TTP.  Low clinical concern for dissection, pneumothorax, SBO, acute mesenteric ischemia, and sepsis.  IV fluids, Zofran, and Tylenol ordered.  Basic labs, cardiac workup, blood cultures, UA, CXR, CT PE, and CTAP ordered.  Please see ED course and disposition for remainder care.    ED Course:  ED Course as of 05/17/25 1706   Mon May 05, 2025   1252 EKG: Rate is 124, sinus rhythm, normal axis, no interval prolongation, no st elevation or depression.  No  previous EKG for comparison.  Review of EKG does not show any signs of STEMI, complete heart block, asystole, V-fib. []   1344 Bedside cardiac POCUS significant for mildly reduced EF and no pericardial effusion.  Thoracic POCUS without significant B-lines. IVC collapsible. [MH]   1350 XR chest 1 view  CXR without an acute cardiopulmonary process. []   1419 CBC with mild leukocytosis and anemia. []   1450 Attending summary:  58 y/o M with PMHx HTN, HLD, HFpEF who is presenting for 1-2 weeks of malaise, decreased appetite and dyspnea on exertion. No fevers, chest pain, vomiting, abd pain, diarrhea, urinary symptoms. No leg anh or swelling, weight gain or orthopnea. Pt reports he feels dehydrated. Has been compliant with meds. On arrival, tachycardic with SBP 90-100s, otherwise unremarkable vitals. Exam shows fatigued but nontoxic appearing male with mild conversational dyspnea, no accessory muscle use, clear lungs, soft nontender abdomen, no LE tenderness or edema.     Pt does not appear hypervolemic, do not believe this is a florid CHF exacerbation. No other localizing signs/symptoms. Appears dry. Will give IV fluids and symptom control, get labs and CTPE and CT a/p as reported abd pain to Dr. Gardner. Afebrile, no specific infectious signs/symptoms or immunocompromise, lower suspicion for sepsis. Has a narrow pulse pressure furthermore. Anticipate obs v admission for hydration and PT/OT.  [SS]      ED Course User Index  [MH] Bakari Gardner MD  [SS] Shara Aceves MD         Diagnoses as of 05/17/25 1706   Acute renal failure, unspecified acute renal failure type       Social Determinants Limiting Care:  None identified    Disposition:  Patient signed out to Dr. Ware in stable condition pending labs, imaging, and reevaluation.  Patient's most likely disposition is admission.    Bakari Gardner MD   Emergency Medicine PGY-3  [facility]    Comment: Please note this report has  been produced using speech recognition software and may contain errors related to that system including errors in grammar, punctuation, and spelling as well as words and phrases that may be inappropriate.  If there are any questions or concerns please feel free to contact the dictating provider for clarification.    Procedures ? SmartLinks last updated 5/17/2025 5:06 PM        Bakari Gardner MD  Resident  05/05/25 1446       Shara Aceves MD  05/17/25 5669

## 2025-05-06 ENCOUNTER — APPOINTMENT (OUTPATIENT)
Dept: CARDIOLOGY | Facility: HOSPITAL | Age: 60
DRG: 683 | End: 2025-05-06
Payer: MEDICARE

## 2025-05-06 LAB
ALBUMIN SERPL BCP-MCNC: 3 G/DL (ref 3.4–5)
ALBUMIN SERPL BCP-MCNC: 3.1 G/DL (ref 3.4–5)
ALBUMIN SERPL BCP-MCNC: 3.2 G/DL (ref 3.4–5)
ALBUMIN SERPL BCP-MCNC: 3.3 G/DL (ref 3.4–5)
ALBUMIN SERPL BCP-MCNC: 3.3 G/DL (ref 3.4–5)
ANION GAP BLDV CALCULATED.4IONS-SCNC: 18 MMOL/L (ref 10–25)
ANION GAP SERPL CALC-SCNC: 17 MMOL/L (ref 10–20)
ANION GAP SERPL CALC-SCNC: 20 MMOL/L (ref 10–20)
ANION GAP SERPL CALC-SCNC: 24 MMOL/L (ref 10–20)
ANION GAP SERPL CALC-SCNC: 25 MMOL/L (ref 10–20)
ANION GAP SERPL CALC-SCNC: 30 MMOL/L (ref 10–20)
AORTIC VALVE PEAK VELOCITY: 1.28 M/S
AV PEAK GRADIENT: 7 MMHG
AVA (PEAK VEL): 2.8 CM2
BASE EXCESS BLDV CALC-SCNC: -4.7 MMOL/L (ref -2–3)
BASOPHILS # BLD AUTO: 0.02 X10*3/UL (ref 0–0.1)
BASOPHILS NFR BLD AUTO: 0.2 %
BODY TEMPERATURE: 37 DEGREES CELSIUS
BUN SERPL-MCNC: 157 MG/DL (ref 6–23)
BUN SERPL-MCNC: 165 MG/DL (ref 6–23)
BUN SERPL-MCNC: 171 MG/DL (ref 6–23)
BUN SERPL-MCNC: 176 MG/DL (ref 6–23)
BUN SERPL-MCNC: 187 MG/DL (ref 6–23)
CA-I BLDV-SCNC: 1.17 MMOL/L (ref 1.1–1.33)
CALCIUM SERPL-MCNC: 8.3 MG/DL (ref 8.6–10.6)
CALCIUM SERPL-MCNC: 8.7 MG/DL (ref 8.6–10.6)
CALCIUM SERPL-MCNC: 8.7 MG/DL (ref 8.6–10.6)
CALCIUM SERPL-MCNC: 9.3 MG/DL (ref 8.6–10.6)
CALCIUM SERPL-MCNC: 9.6 MG/DL (ref 8.6–10.6)
CANCER AG19-9 SERPL-ACNC: 39.67 U/ML
CEA SERPL-MCNC: <0.5 UG/L
CHLORIDE BLDV-SCNC: 100 MMOL/L (ref 98–107)
CHLORIDE SERPL-SCNC: 100 MMOL/L (ref 98–107)
CHLORIDE SERPL-SCNC: 97 MMOL/L (ref 98–107)
CHLORIDE SERPL-SCNC: 97 MMOL/L (ref 98–107)
CHLORIDE SERPL-SCNC: 98 MMOL/L (ref 98–107)
CHLORIDE SERPL-SCNC: 98 MMOL/L (ref 98–107)
CO2 SERPL-SCNC: 13 MMOL/L (ref 21–32)
CO2 SERPL-SCNC: 16 MMOL/L (ref 21–32)
CO2 SERPL-SCNC: 18 MMOL/L (ref 21–32)
CO2 SERPL-SCNC: 20 MMOL/L (ref 21–32)
CO2 SERPL-SCNC: 21 MMOL/L (ref 21–32)
CREAT SERPL-MCNC: 3.57 MG/DL (ref 0.5–1.3)
CREAT SERPL-MCNC: 4.44 MG/DL (ref 0.5–1.3)
CREAT SERPL-MCNC: 5.23 MG/DL (ref 0.5–1.3)
CREAT SERPL-MCNC: 5.74 MG/DL (ref 0.5–1.3)
CREAT SERPL-MCNC: 5.75 MG/DL (ref 0.5–1.3)
EGFRCR SERPLBLD CKD-EPI 2021: 11 ML/MIN/1.73M*2
EGFRCR SERPLBLD CKD-EPI 2021: 11 ML/MIN/1.73M*2
EGFRCR SERPLBLD CKD-EPI 2021: 12 ML/MIN/1.73M*2
EGFRCR SERPLBLD CKD-EPI 2021: 14 ML/MIN/1.73M*2
EGFRCR SERPLBLD CKD-EPI 2021: 19 ML/MIN/1.73M*2
EJECTION FRACTION APICAL 4 CHAMBER: 26.6
EJECTION FRACTION: 28 %
EOSINOPHIL # BLD AUTO: 0.06 X10*3/UL (ref 0–0.7)
EOSINOPHIL NFR BLD AUTO: 0.6 %
ERYTHROCYTE [DISTWIDTH] IN BLOOD BY AUTOMATED COUNT: 16.6 % (ref 11.5–14.5)
GLUCOSE BLD MANUAL STRIP-MCNC: 134 MG/DL (ref 74–99)
GLUCOSE BLD MANUAL STRIP-MCNC: 190 MG/DL (ref 74–99)
GLUCOSE BLDV-MCNC: 138 MG/DL (ref 74–99)
GLUCOSE SERPL-MCNC: 133 MG/DL (ref 74–99)
GLUCOSE SERPL-MCNC: 136 MG/DL (ref 74–99)
GLUCOSE SERPL-MCNC: 136 MG/DL (ref 74–99)
GLUCOSE SERPL-MCNC: 138 MG/DL (ref 74–99)
GLUCOSE SERPL-MCNC: 185 MG/DL (ref 74–99)
HCO3 BLDV-SCNC: 19.2 MMOL/L (ref 22–26)
HCT VFR BLD AUTO: 31.4 % (ref 41–52)
HCT VFR BLD EST: 34 % (ref 41–52)
HGB BLD-MCNC: 10.4 G/DL (ref 13.5–17.5)
HGB BLDV-MCNC: 11.4 G/DL (ref 13.5–17.5)
IMM GRANULOCYTES # BLD AUTO: 0.47 X10*3/UL (ref 0–0.7)
IMM GRANULOCYTES NFR BLD AUTO: 4.6 % (ref 0–0.9)
INHALED O2 CONCENTRATION: 21 %
KAPPA LC SERPL-MCNC: 13.6 MG/DL (ref 0.33–1.94)
KAPPA LC/LAMBDA SER: 1.4 {RATIO} (ref 0.26–1.65)
LACTATE BLDV-SCNC: 1 MMOL/L (ref 0.4–2)
LAMBDA LC SERPL-MCNC: 9.72 MG/DL (ref 0.57–2.63)
LDH SERPL L TO P-CCNC: 315 U/L (ref 84–246)
LDH SERPL L TO P-CCNC: 333 U/L (ref 84–246)
LEFT VENTRICLE INTERNAL DIMENSION DIASTOLE: 5.6 CM (ref 3.5–6)
LEFT VENTRICULAR OUTFLOW TRACT DIAMETER: 2.2 CM
LYMPHOCYTES # BLD AUTO: 0.75 X10*3/UL (ref 1.2–4.8)
LYMPHOCYTES NFR BLD AUTO: 7.3 %
MAGNESIUM SERPL-MCNC: 2.31 MG/DL (ref 1.6–2.4)
MAGNESIUM SERPL-MCNC: 2.52 MG/DL (ref 1.6–2.4)
MAGNESIUM SERPL-MCNC: 2.84 MG/DL (ref 1.6–2.4)
MCH RBC QN AUTO: 26.9 PG (ref 26–34)
MCHC RBC AUTO-ENTMCNC: 33.1 G/DL (ref 32–36)
MCV RBC AUTO: 81 FL (ref 80–100)
MONOCYTES # BLD AUTO: 0.46 X10*3/UL (ref 0.1–1)
MONOCYTES NFR BLD AUTO: 4.5 %
NEUTROPHILS # BLD AUTO: 8.45 X10*3/UL (ref 1.2–7.7)
NEUTROPHILS NFR BLD AUTO: 82.8 %
NRBC BLD-RTO: 0 /100 WBCS (ref 0–0)
OXYHGB MFR BLDV: 86.8 % (ref 45–75)
PCO2 BLDV: 31 MM HG (ref 41–51)
PH BLDV: 7.4 PH (ref 7.33–7.43)
PHOSPHATE SERPL-MCNC: 5.3 MG/DL (ref 2.5–4.9)
PHOSPHATE SERPL-MCNC: 6.5 MG/DL (ref 2.5–4.9)
PHOSPHATE SERPL-MCNC: 8.6 MG/DL (ref 2.5–4.9)
PHOSPHATE SERPL-MCNC: 8.7 MG/DL (ref 2.5–4.9)
PHOSPHATE SERPL-MCNC: 9.1 MG/DL (ref 2.5–4.9)
PLATELET # BLD AUTO: 442 X10*3/UL (ref 150–450)
PO2 BLDV: 60 MM HG (ref 35–45)
POTASSIUM BLDV-SCNC: 4.5 MMOL/L (ref 3.5–5.3)
POTASSIUM SERPL-SCNC: 3.9 MMOL/L (ref 3.5–5.3)
POTASSIUM SERPL-SCNC: 4.2 MMOL/L (ref 3.5–5.3)
POTASSIUM SERPL-SCNC: 6.1 MMOL/L (ref 3.5–5.3)
POTASSIUM SERPL-SCNC: 6.6 MMOL/L (ref 3.5–5.3)
POTASSIUM SERPL-SCNC: 8.5 MMOL/L (ref 3.5–5.3)
PSA SERPL-MCNC: 5.47 NG/ML
RBC # BLD AUTO: 3.86 X10*6/UL (ref 4.5–5.9)
SAO2 % BLDV: 89 % (ref 45–75)
SODIUM BLDV-SCNC: 133 MMOL/L (ref 136–145)
SODIUM SERPL-SCNC: 131 MMOL/L (ref 136–145)
SODIUM SERPL-SCNC: 132 MMOL/L (ref 136–145)
SODIUM SERPL-SCNC: 133 MMOL/L (ref 136–145)
SODIUM SERPL-SCNC: 134 MMOL/L (ref 136–145)
SODIUM SERPL-SCNC: 134 MMOL/L (ref 136–145)
TRICUSPID ANNULAR PLANE SYSTOLIC EXCURSION: 1.1 CM
URATE SERPL-MCNC: 13.2 MG/DL (ref 4–7.5)
URATE SERPL-MCNC: 20.1 MG/DL (ref 4–7.5)
WBC # BLD AUTO: 10.2 X10*3/UL (ref 4.4–11.3)

## 2025-05-06 PROCEDURE — 2500000004 HC RX 250 GENERAL PHARMACY W/ HCPCS (ALT 636 FOR OP/ED): Mod: JW

## 2025-05-06 PROCEDURE — 99221 1ST HOSP IP/OBS SF/LOW 40: CPT | Performed by: STUDENT IN AN ORGANIZED HEALTH CARE EDUCATION/TRAINING PROGRAM

## 2025-05-06 PROCEDURE — 83735 ASSAY OF MAGNESIUM: CPT

## 2025-05-06 PROCEDURE — 86301 IMMUNOASSAY TUMOR CA 19-9: CPT

## 2025-05-06 PROCEDURE — 99221 1ST HOSP IP/OBS SF/LOW 40: CPT

## 2025-05-06 PROCEDURE — 82435 ASSAY OF BLOOD CHLORIDE: CPT | Performed by: STUDENT IN AN ORGANIZED HEALTH CARE EDUCATION/TRAINING PROGRAM

## 2025-05-06 PROCEDURE — 93306 TTE W/DOPPLER COMPLETE: CPT | Performed by: INTERNAL MEDICINE

## 2025-05-06 PROCEDURE — 2500000004 HC RX 250 GENERAL PHARMACY W/ HCPCS (ALT 636 FOR OP/ED): Mod: JZ

## 2025-05-06 PROCEDURE — 1200000002 HC GENERAL ROOM WITH TELEMETRY DAILY

## 2025-05-06 PROCEDURE — 82607 VITAMIN B-12: CPT

## 2025-05-06 PROCEDURE — 82746 ASSAY OF FOLIC ACID SERUM: CPT

## 2025-05-06 PROCEDURE — 36415 COLL VENOUS BLD VENIPUNCTURE: CPT | Performed by: STUDENT IN AN ORGANIZED HEALTH CARE EDUCATION/TRAINING PROGRAM

## 2025-05-06 PROCEDURE — 83615 LACTATE (LD) (LDH) ENZYME: CPT | Performed by: STUDENT IN AN ORGANIZED HEALTH CARE EDUCATION/TRAINING PROGRAM

## 2025-05-06 PROCEDURE — 82728 ASSAY OF FERRITIN: CPT

## 2025-05-06 PROCEDURE — 84550 ASSAY OF BLOOD/URIC ACID: CPT

## 2025-05-06 PROCEDURE — 80069 RENAL FUNCTION PANEL: CPT

## 2025-05-06 PROCEDURE — 36415 COLL VENOUS BLD VENIPUNCTURE: CPT

## 2025-05-06 PROCEDURE — 83615 LACTATE (LD) (LDH) ENZYME: CPT

## 2025-05-06 PROCEDURE — 83735 ASSAY OF MAGNESIUM: CPT | Performed by: STUDENT IN AN ORGANIZED HEALTH CARE EDUCATION/TRAINING PROGRAM

## 2025-05-06 PROCEDURE — 83540 ASSAY OF IRON: CPT

## 2025-05-06 PROCEDURE — 80069 RENAL FUNCTION PANEL: CPT | Performed by: STUDENT IN AN ORGANIZED HEALTH CARE EDUCATION/TRAINING PROGRAM

## 2025-05-06 PROCEDURE — 82330 ASSAY OF CALCIUM: CPT

## 2025-05-06 PROCEDURE — 82947 ASSAY GLUCOSE BLOOD QUANT: CPT

## 2025-05-06 PROCEDURE — 2500000001 HC RX 250 WO HCPCS SELF ADMINISTERED DRUGS (ALT 637 FOR MEDICARE OP)

## 2025-05-06 PROCEDURE — C8929 TTE W OR WO FOL WCON,DOPPLER: HCPCS

## 2025-05-06 PROCEDURE — 82960 TEST FOR G6PD ENZYME: CPT

## 2025-05-06 PROCEDURE — 82378 CARCINOEMBRYONIC ANTIGEN: CPT

## 2025-05-06 PROCEDURE — 84153 ASSAY OF PSA TOTAL: CPT

## 2025-05-06 PROCEDURE — 2500000002 HC RX 250 W HCPCS SELF ADMINISTERED DRUGS (ALT 637 FOR MEDICARE OP, ALT 636 FOR OP/ED)

## 2025-05-06 PROCEDURE — 85025 COMPLETE CBC W/AUTO DIFF WBC: CPT

## 2025-05-06 PROCEDURE — 99223 1ST HOSP IP/OBS HIGH 75: CPT

## 2025-05-06 RX ORDER — SODIUM CHLORIDE 9 MG/ML
100 INJECTION, SOLUTION INTRAVENOUS CONTINUOUS
Status: DISCONTINUED | OUTPATIENT
Start: 2025-05-06 | End: 2025-05-07

## 2025-05-06 RX ORDER — ALLOPURINOL 300 MG/1
300 TABLET ORAL DAILY
Status: DISCONTINUED | OUTPATIENT
Start: 2025-05-06 | End: 2025-05-10 | Stop reason: HOSPADM

## 2025-05-06 RX ADMIN — ATORVASTATIN CALCIUM 40 MG: 40 TABLET, FILM COATED ORAL at 20:45

## 2025-05-06 RX ADMIN — SODIUM CHLORIDE 100 ML/HR: 0.9 INJECTION, SOLUTION INTRAVENOUS at 12:12

## 2025-05-06 RX ADMIN — FERROUS SULFATE TAB 325 MG (65 MG ELEMENTAL FE) 325 MG: 325 (65 FE) TAB at 08:58

## 2025-05-06 RX ADMIN — INSULIN LISPRO 1 UNITS: 100 INJECTION, SOLUTION INTRAVENOUS; SUBCUTANEOUS at 15:59

## 2025-05-06 RX ADMIN — SENNOSIDES AND DOCUSATE SODIUM 2 TABLET: 50; 8.6 TABLET ORAL at 08:58

## 2025-05-06 RX ADMIN — SODIUM CHLORIDE 1000 ML: 0.9 INJECTION, SOLUTION INTRAVENOUS at 00:07

## 2025-05-06 RX ADMIN — SODIUM CHLORIDE 100 ML/HR: 0.9 INJECTION, SOLUTION INTRAVENOUS at 22:31

## 2025-05-06 RX ADMIN — SODIUM CHLORIDE 6 MG: 9 INJECTION, SOLUTION INTRAVENOUS at 14:07

## 2025-05-06 RX ADMIN — ALLOPURINOL 300 MG: 300 TABLET ORAL at 15:51

## 2025-05-06 RX ADMIN — ASPIRIN 81 MG CHEWABLE TABLET 81 MG: 81 TABLET CHEWABLE at 08:59

## 2025-05-06 RX ADMIN — PERFLUTREN 4 ML OF DILUTION: 6.52 INJECTION, SUSPENSION INTRAVENOUS at 08:21

## 2025-05-06 ASSESSMENT — LIFESTYLE VARIABLES
TOTAL SCORE: 0
EVER FELT BAD OR GUILTY ABOUT YOUR DRINKING: NO
HAVE YOU EVER FELT YOU SHOULD CUT DOWN ON YOUR DRINKING: NO
HAVE PEOPLE ANNOYED YOU BY CRITICIZING YOUR DRINKING: NO
EVER HAD A DRINK FIRST THING IN THE MORNING TO STEADY YOUR NERVES TO GET RID OF A HANGOVER: NO

## 2025-05-06 ASSESSMENT — PAIN SCALES - GENERAL: PAINLEVEL_OUTOF10: 0 - NO PAIN

## 2025-05-06 NOTE — CONSULTS
Name: Cam Evans  MRN: [id number]  Encounter Date: 5/6/2025  PCP: Julia Pedersen, APRN-CNP  Heme-Onc: not yet established    Reason for consult: new rectal mass with metastatic disease, c/f TLS   Attending Provider: Dr. Arreaga     Hematology/ Oncology Consult Note      History of Present Illness   Cam Evans is a 59 y.o. male PMHx HFrEF (EF 25% 5/2025), HTN, HLD who presented with generalized weakness and fatigue, admitted for acute renal failure.     Has had flank and low back pain, constipation, and bloody stools. He denies weight loss. Feeling bloated with emesis in the past but none currently. Main symptom right now is fatigue. Denied rectal pain. +constipation, last BM was yesterday with some dark red blood. Reports back pain is chronic. Denied loss of bowel/bladder continence, focal weakness, or new numbness/tingling.     On labs was found to have K 8.5, , Cr 5.75, phos 9.7, Ca 8.3, Uric acid 20. . Prior Cr 1.26 5/7/24 in [facility]     CT c/a/p obtained showing 3.0x3.0 cm soft tissue mass abutting left side of the rectum with morena rectal adenopathy, multiple ill defined hypodense masses in the liver, multiple sclerotic bone lesions.     He was started on IVF. He reports peeing a lot with the fluids.      Prior to recent illness, he was living at home alone. He has been retired for last 5 years due to his heart failure. He is independent in ADL/IADLs. He reports his mother is a source of support for him     Past Medical history   Medical History[1]      Past Surgical History   Surgical History[2]      Family History    Family History[3]      Social History   Social History[4]      Allergies   Allergies[5]    Medications   aspirin, 81 mg, Daily  atorvastatin, 40 mg, Nightly  ferrous sulfate, 65 mg of iron, Daily with breakfast  [Held by provider] heparin (porcine), 5,000 Units, q8h ANTONIO  insulin lispro, 0-5 Units, TID AC  polyethylene glycol, 17 g, Daily  rasburicase, 0.2 mg/kg,  Once  sennosides-docusate sodium, 2 tablet, BID      sodium bicarbonate 1 mEq/mL (8.4 %) 150 mEq in dextrose 5% 1,150 mL infusion, Last Rate: 75 mL/hr (25)  sodium chloride 0.9%, Last Rate: 100 mL/hr (25 1212)      dextrose, 12.5 g, q15 min PRN  dextrose, 25 g, q15 min PRN  glucagon, 1 mg, q15 min PRN  glucagon, 1 mg, q15 min PRN        Review of Systems   Review of Systems   10 pt ROS reviewed and negative aside from above    Physical Exam   Blood pressure 98/74, pulse (!) 105, temperature 36.7 °C (98.1 °F), temperature source Temporal, resp. rate 16, height 1.829 m (6'), weight 99.8 kg (220 lb), SpO2 96%.    ECO    Gen: awake, appears fatigued, conversing appropriately   HEENT: AT/NC, PEERL, EOMI, no LAD  CV: tachycardia   Pulm: non labored breathing   Abd: soft, NT/ND, firm mass palpated in the RUQ   Ext: no LE edema  Skin: warm and dry  Neuro: A&Ox4, moves all 4 extremities spontaneously     Labs     Results from last 7 days   Lab Units 25  0301 25  1324   WBC AUTO x10*3/uL 10.2 11.9*   HEMOGLOBIN g/dL 10.4* 12.1*   HEMATOCRIT % 31.4* 34.9*   PLATELETS AUTO x10*3/uL 442 519*       Results from last 7 days   Lab Units 25  1144 25  0614 25  0452 25  0301 25  2215 25  2212 25  1324   SODIUM mmol/L 134* 132* 133*   < >  --    < > 133*   POTASSIUM mmol/L 4.2 6.6* 6.1*   < >  --    < > 5.2   CHLORIDE mmol/L 98 97* 98   < >  --    < > 93*   CO2 mmol/L 20* 18* 16*   < >  --    < > 12*   BUN mg/dL 165* 176* 187*   < >  --    < > 203*   CREATININE mg/dL 4.44* 5.23* 5.74*   < >  --    < > 7.93*   CALCIUM mg/dL 9.6 8.7 8.7   < >  --    < > 10.1   PROTEIN TOTAL g/dL  --   --   --   --  7.8  --  9.5*   BILIRUBIN TOTAL mg/dL  --   --   --   --   --   --  0.5   ALK PHOS U/L  --   --   --   --   --   --  175*   ALT U/L  --   --   --   --   --   --  6*   AST U/L  --   --   --   --   --   --  6*   GLUCOSE mg/dL 133* 138* 136*   < >  --    < > 92    < > =  values in this interval not displayed.       Lab Results   Component Value Date    ALT 6 (L) 05/05/2025    AST 6 (L) 05/05/2025    ALKPHOS 175 (H) 05/05/2025    BILITOT 0.5 05/05/2025     Imaging   === 05/05/25 ===    XR CHEST 1 VIEW    - Impression -  1.  No evidence of acute cardiopulmonary process. Low lung volumes.      MACRO:  None    Signed by: Chente Samuel 5/5/2025 1:45 PM  Dictation workstation:   [id number]    === 05/05/25 ===    CT CHEST ABDOMEN PELVIS WO CONTRAST    - Impression -  1. 3.0 x 3.0 cm soft tissue mass abutting the left side of the rectum  with perirectal adenopathy, concerning for malignancy with metastatic  disease. PET/CT can be obtained for further evaluation.  2. Hepatomegaly. Multiple ill-defined hypodense masses at the liver,  consistent with metastatic disease.  3. Diffuse sclerotic appearance of T8 vertebral body. Sclerotic  lesions at the ribs with expansile lesion at the posterior left 5th  rib. Lytic and sclerotic lesions at the spine and at the pelvis.  Findings are probably representing osseous metastatic disease.  4. Mild colonic diverticulosis.  5. Prostatomegaly.  6. Small hiatal hernia.  7. Cardiomegaly. Dilated main pulmonary artery, please correlate for  pulmonary arterial hypertension.  8. Mild right basilar atelectasis.          MACRO:    None.    Signed by: Amanda Tracy 5/5/2025 7:25 PM  Dictation workstation:   [id number]    Assessment/Plan     Cam Evans is a 59 y.o. male PMHx HFrEF (EF 25% 5/2025), HTN, HLD, anemia who presented with generalized weakness and fatigue, admitted for acute renal failure.     On CT found to have rectal mass, extensive liver mets, sclerotic bone lesions. Overall picture is concerning for metastatic cancer, suspect primary rectal malignancy.     Labs are consistent with spontaneous TLS. Nephrology is following.     #Spontaneous TLS  #Rectal mass, liver masses, bone lesions   Recommendations:  -Agree with IV NS. Note hx of heart  failure   -Agree with rasburicase x 1 dose  -start allopurinol 300mg daily for TLS ppx   -Trend TLS labs, strict I/O   -Will need tissue biopsy to establish diagnosis. Recommend biopsy of the liver mass  -Will need outpatient oncology referral     Thank you for this consult, we will continue to follow. Patient discussed with attending physician, Dr. Cartwirght.    Laurie Elizondo MD  Hematology-Oncology Fellow, PGY4  Oncology Consult Pager: [phone]           [1] No past medical history on file.  [2] No past surgical history on file.  [3] No family history on file.  [4]   Social History  Socioeconomic History    Marital status: Single     Social Drivers of Health     Financial Resource Strain: Not on File (2019)    Received from [facility]    Financial Resource Strain     Financial Resource Strain: 0   Food Insecurity: Not on File (2024)    Received from [facility]    Food Insecurity     Food: 0   Transportation Needs: Not on File (2019)    Received from [facility]    Transportation Needs     Transportation: 0   Physical Activity: Not on File (2019)    Received from [facility]    Physical Activity     Physical Activity: 0   Stress: Not on File (2019)    Received from [facility]    Stress     Stress: 0   Social Connections: Not on File (2024)    Received from [facility]    Social Connections     Connectedness: 0   Housing Stability: Not on File (2019)    Received from [facility] [employer]    Housing Stability     Housin   [5] No Known Allergies

## 2025-05-06 NOTE — H&P
INTERNAL MEDICINE H&P ADMISSION NOTE  ================================================    History Of Present Illness  Cam Evans is a 59 y.o. male with PMHx of CHF (EF 20% on 2021), iron deficiency anemia, HTN, and dyslipidemia, presenting with generalized weakness and fatigue for the past week.    Patient reported to have significant flank and lower back pain ongoing for the past month, along with feeling constipated. Also noticed that since last month, he had less frequent bowel movement and need to use laxative with straining while defecation. He also had many episodes of nonbloody emesis and feels his belly is bloated. Not noticing if he had passed any blood along with his BMs. He reported not having any colonoscopy done before.    For the past week, patient reported not having good appetite. Intermittently constipated. Feels more fatigued and not able to do activities well as he gets tired easily. Reported to have SOB on exertion but no orthopnea/PND. No fever, chest pain. He still able to drink fluids and endorses no changes on his urine output. Denies weight gain or swelling of his extremities. Today, he was seen by PCP at [facility] and with this persistence of poor oral intake and easy fatigability, he was sent to our ED for further evaluation and management.      Past Medical History  Medical History[1]    Surgical History  Surgical History[2]     Social History  He has no history on file for tobacco use, alcohol use, and drug use.    Family History  Family History[3]     Allergies  Patient has no known allergies.    Review of Systems   Constitutional:  Positive for activity change, appetite change and fatigue. Negative for chills and fever.   HENT:  Negative for congestion and facial swelling.    Respiratory:  Negative for shortness of breath.    Cardiovascular:  Negative for chest pain and palpitations.   Gastrointestinal:  Positive for constipation, nausea and vomiting. Negative for abdominal distention,  abdominal pain, diarrhea and rectal pain.   Genitourinary:  Positive for flank pain. Negative for difficulty urinating, dysuria and hematuria.   Neurological:  Positive for weakness. Negative for dizziness, syncope, light-headedness and headaches.     Physical Exam  Constitutional:       Appearance: He is ill-appearing.   Cardiovascular:      Rate and Rhythm: Normal rate and regular rhythm.      Pulses: Normal pulses.      Heart sounds: Normal heart sounds.   Pulmonary:      Effort: Pulmonary effort is normal.      Breath sounds: Normal breath sounds.   Abdominal:      General: Bowel sounds are normal.      Palpations: Abdomen is soft.   Musculoskeletal:         General: No swelling.      Right lower leg: No edema.      Left lower leg: No edema.   Skin:     General: Skin is warm.      Capillary Refill: Capillary refill takes less than 2 seconds.      Coloration: Skin is not jaundiced or pale.   Neurological:      General: No focal deficit present.      Mental Status: He is alert and oriented to person, place, and time.       Last Recorded Vitals  Blood pressure 89/53, pulse 85, temperature 36.4 °C (97.5 °F), temperature source Oral, resp. rate 15, height 1.829 m (6'), weight 99.8 kg (220 lb), SpO2 96%.    Relevant Results   05/05/25 13:24   GLUCOSE 92   SODIUM 133 (L)   POTASSIUM 5.2   CHLORIDE 93 (L)   Bicarbonate 12 (L)   Anion Gap 33 (H)   Blood Urea Nitrogen 203 (HH)   Creatinine 7.93 (H)   EGFR 7 (L)   Calcium 10.1   Albumin 3.8   Alkaline Phosphatase 175 (H)   ALT 6 (L)   AST 6 (L)   Bilirubin Total 0.5   Total Protein 9.5 (H)   MAGNESIUM 3.10 (H)   BNP 54   LIPASE 42   Troponin I, High Sensitivity (CMC) 26   Beta-Hydroxybutyrate 1.98 (H)      05/05/25 13:24   WBC 11.9 (H)   nRBC 0.0   RBC 4.46 (L)   HEMOGLOBIN 12.1 (L)   HEMATOCRIT 34.9 (L)   MCV 78 (L)   MCH 27.1   MCHC 34.7   RDW 15.8 (H)   Platelets 519 (H)   Neutrophils %, Manual 87.7   Lymphocytes %, Manual 3.8   Monocytes %, Manual 5.4   Eosinophils %,  Manual 0.8   Basophils %, Manual 0.0   Metamyelocytes % 0.8   Myelocytes %, Manual 1.5   Seg Neutrophils Absolute 10.44 (H)      05/05/25 15:51   Color, Urine Light-Yellow   Appearance, Urine Turbid !   Specific Gravity, Urine 1.016   pH, Urine 5.0   Protein, Urine 20 (TRACE)   Glucose, Urine Normal   Blood, Urine 0.03 (TRACE) !   Ketones, Urine NEGATIVE   Bilirubin, Urine NEGATIVE   Urobilinogen, Urine Normal   Nitrite, Urine NEGATIVE   Leukocyte Esterase, Urine NEGATIVE   WBC, Urine 6-10 !   RBC, Urine 1-2   Hyaline Casts, Urine 3+ !      05/05/25 12:52   POCT pH, Venous 7.30 (L)   POCT pCO2, Venous 27 (L)   POCT pO2, Venous 55 (H)   POCT SO2, Venous 81 (H)   POCT Oxy Hemoglobin, Venous 79.4 (H)   POCT Hematocrit Calculated, Venous 38.0 (L)   POCT Sodium, Venous 128 (L)   POCT Potassium, Venous 5.5 (H)   POCT Chloride, Venous 94 (L)   POCT Ionized Calicum, Venous 1.14   POCT Glucose, Venous 111 (H)   POCT Lactate, Venous 1.2   POCT Base Excess, Venous -11.6 (L)   POCT HCO3 Calculated, Venous 13.3 (L)   POCT Hemoglobin, Venous 12.8 (L)   POCT Anion Gap, Venous 26.0 (H)   Patient Temperature 37.0     ECG 12 lead  Result Date: 5/5/2025  Sinus tachycardia Left axis deviation Inferior infarct , age undetermined Anterolateral infarct , age undetermined Abnormal ECG No previous ECGs available See ED provider note for full interpretation and clinical correlation Confirmed by Elena Dempsey ([id number]) on 5/5/2025 7:38:33 PM    CT chest abdomen pelvis wo IV contrast  Result Date: 5/5/2025  Interpreted By:  Amanda Tracy, STUDY: CT CHEST ABDOMEN PELVIS WO CONTRAST;  5/5/2025 7:01 pm   INDICATION: Signs/Symptoms:Right flank pain, dyspnea on exertion.   COMPARISON: Chest x-ray 05/05/2025   ACCESSION NUMBER(S): [id number]   ORDERING CLINICIAN: SIENA MCCARTHY   TECHNIQUE: Axial CT of the chest, abdomen, and pelvis was performed. Coronal and sagittal reconstructions were performed. No intravenous or oral contrast agents were  administered.   FINDINGS: Please note that the study is limited without intravenous contrast. There is motion artifact.   CHEST:     LUNG/PLEURA/LARGE AIRWAYS: The trachea and central airways are patent.   The evaluation of the lungs is degraded by motion artifact. No pleural effusion, consolidation or pneumothorax. There is elevation of the right hemidiaphragm with superior location of right hepatic lobe. There is mild atelectasis at the right lung base.   VESSELS: No thoracic aortic aneurysm. The main pulmonary artery is dilated.   HEART: No pericardial effusion.  The heart is enlarged.  Mild coronary artery calcifications are noted.   MEDIASTINUM AND EMILI: The evaluation for adenopathy suboptimal in the absence of intravenous contrast.  No obvious pathologically enlarged lymph nodes at the mediastinum.   There is a small hiatal hernia.   CHEST WALL AND LOWER NECK: The visualized thyroid gland is unremarkable. There is bilateral gynecomastia, right more than left. Sclerotic lesions at the bilateral ribs. Expansile lesion with adjacent small soft tissue density at the posterior aspect of the left 5th rib. Multilevel degenerative changes at the spine. There is a diffuse sclerotic appearance of T8 vertebral body. Additional lytic and sclerotic lesions are noted at the spine.     ABDOMEN:   LIVER: The liver measures 21.4 cm in craniocaudal diameter. There are multiple ill-defined hypodense masses in the liver. A representative mass at the left hepatic lobe measures 8.0 x 5.7 cm.   BILE DUCTS: No obvious dilation.   GALLBLADDER: Present.   PANCREAS: No peripancreatic inflammatory changes.   SPLEEN: Unremarkable unenhanced appearance.   ADRENAL GLANDS: Unremarkable.   KIDNEYS AND URETERS: No hydronephrosis or hydroureter.  No obstructing ureteral calculi are identified. There are right renal cysts, the largest measures 3.3 cm.   PELVIS:   BLADDER: Partially distended.   REPRODUCTIVE ORGANS: The prostate measures 5.5 cm  in transverse diameter.   BOWEL: No bowel obstruction. There is scattered colonic diverticulosis with no CT evidence for acute diverticulitis. The appendix is normal. There is a 3.0 x 3.0 cm soft tissue mass abutting the left side of the rectum.   VESSELS: No abdominal aortic aneurysm.   PERITONEUM/RETROPERITONEUM/LYMPH NODES: No free fluid or free air.  No retroperitoneal hemorrhage. An enlarged lymph node along the right side of the rectum measures 1.6 cm in short axis.   ABDOMINAL WALL: There is a small fat containing umbilical hernia.   BONES: Multilevel degenerative changes of the spine. Ill-defined lytic and sclerotic lesions are noted at the spine and at the pelvis.       1. 3.0 x 3.0 cm soft tissue mass abutting the left side of the rectum with perirectal adenopathy, concerning for malignancy with metastatic disease. PET/CT can be obtained for further evaluation. 2. Hepatomegaly. Multiple ill-defined hypodense masses at the liver, consistent with metastatic disease. 3. Diffuse sclerotic appearance of T8 vertebral body. Sclerotic lesions at the ribs with expansile lesion at the posterior left 5th rib. Lytic and sclerotic lesions at the spine and at the pelvis. Findings are probably representing osseous metastatic disease. 4. Mild colonic diverticulosis. 5. Prostatomegaly. 6. Small hiatal hernia. 7. Cardiomegaly. Dilated main pulmonary artery, please correlate for pulmonary arterial hypertension. 8. Mild right basilar atelectasis.         MACRO:   None.   Signed by: Amanda Tracy 5/5/2025 7:25 PM Dictation workstation:   [id number]    XR chest 1 view  Result Date: 5/5/2025  Interpreted By:  Chente Samuel, STUDY: XR CHEST 1 VIEW;  5/5/2025 1:28 pm   INDICATION: Signs/Symptoms:hypotension and tachycardia.     COMPARISON: None.   ACCESSION NUMBER(S): [id number]   ORDERING CLINICIAN: DEMETRIA FERNANDEZ   FINDINGS: AP radiograph of the chest was provided.       CARDIOMEDIASTINAL SILHOUETTE: Cardiomediastinal  silhouette is normal in size and configuration.   LUNGS: Low lung volumes with bronchovascular crowding. No focal consolidation, pleural effusion, or pneumothorax.   ABDOMEN: No remarkable upper abdominal findings.   BONES: No acute osseous changes.       1.  No evidence of acute cardiopulmonary process. Low lung volumes.     MACRO: None   Signed by: Chente Samuel 5/5/2025 1:45 PM Dictation workstation:   [id number]     Medications Ordered Prior to Encounter[4]     Assessment/Plan  Cam Evans is a 59 y.o. male with PMHx of CHF (EF 20% on 2021), iron deficiency anemia, HTN, and dyslipidemia, presenting with generalized weakness and fatigue and loss of appetite for the past week. Had history of bowel habit change with constipation for the past month. Hypotensive at ED S/P IV LR 2 L. Labs showing acute renal failure with , Cr 7.93, acidosis with HCO3 of 12. CT CAP wo IV contrast showing 3x3 cm soft tissue mass at rectum with multiple ill-defined hypodense mass at liver and diffuse sclerotic vertebral lesion at T8 and left 5th rib, suspected metastatic disease.    #Acute renal failure  #Metabolic acidosis from      - azotemia     - starvation ketosis  :: Prerenal azotemia VS ATN: still produce some urine, no asterixis  :: BUN/Cr 203/7.93, no baseline Cr  :: Hypotensive at initial presentation S/P IV LR 2 L  :: UA - pH 5.0, SpGr 1.016, protein trace, WBC 6-10, RBC 1-2  :: VBG pH 7.30, PCO2 27, lactate 1.2, B-hydroxybutyrate level 1.98  :: CT: not seen hydroureteronephrosis  Plan   - Renal consulted, appreciated recs   - IV bicarbonate drip 75 mL/hr   - If hypotensive, consider bolus IV of  mL   - will follow RFP   - Renal: CK, hapto, LDH, uric acid, W/U for MM   - avoid nephrotoxic agents/medications, keep euvolemia and normotensive   - NPO; on hypoglycemic protocol    #Rectal mass, 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  Plan   - Consider consult GI for colonoscopy in the AM   - Consider consult IR for possible biopsy in the AM    #History of HFrEF  #HTN  :: Hypo- to euvolemic upon initial assessment, BP 90-100s/60s at ED  Plan   - Hold off spironolactone, losartan in setting of VERO and hypotension   - Will repeat TTE to better estimate LVEF   - will closely monitor fluid given   - closely follow-up I/Os    #Dyslipidemia  Continue atorvastatin 40 mg daily    #Iron deficiency anemia  Continue ferrous sulfate tablet 325 daily    F: IV bicarb drip 75 mL/hr  E: replete prn  N: NPO  A: PIVx1    DVT ppx: Heparin 5000 U subcutaneous q 8 hr  GI ppx: -  Bowel regiment: miralax, pericolace    Code: Full  NOK: Mali Galan (Mother) [phone] (Mobile)     Gee Gomez MD  PGY-1, Internal Medicine/Medical Genetics       [1] No past medical history on file.  [2] No past surgical history on file.  [3] No family history on file.  [4]   No current facility-administered medications on file prior to encounter.     Current Outpatient Medications on File Prior to Encounter   Medication Sig Dispense Refill    aspirin 81 mg EC tablet Take 1 tablet (81 mg) by mouth once daily.      atorvastatin (Lipitor) 40 mg tablet Take 1 tablet (40 mg) by mouth once daily.      cholecalciferol (Vitamin D-3) 125 mcg (5,000 units) tablet Take 1 tablet (125 mcg) by mouth once daily.      ferrous sulfate 325 mg (65 mg elemental) tablet Take 1 tablet (325 mg) by mouth once daily with breakfast.      loratadine (Claritin) 10 mg tablet Take 1 tablet (10 mg) by mouth once daily as needed.      losartan (Cozaar) 100 mg tablet Take 1 tablet (100 mg) by mouth once daily.      metoprolol succinate XL (Toprol-XL) 100 mg 24 hr tablet Take 1 tablet (100 mg)  by mouth once daily.      spironolactone (Aldactone) 50 mg tablet Take 1 tablet (50 mg) by mouth once daily.

## 2025-05-06 NOTE — ED NOTES
per report this pts Bps were soft most the day, inpatient is aware of this with low concerns. BP has yet to change, was lower prior to consulting night inpatient team. After moving pt position retaking BP, there was an increase, however, as I was cleaning out his commode, I noticed blood, it seemed like bloody loose stool at first until I dumped it to look under the wipes, noted congealed blood with coffee ground stool within, and some tarry stool in it too.     Augie Dickerson MD was added by Gee Gomez MD.  Within the chat to inpatient team,  he feels more weak, wasn't able to pee in the commode, ended up peeing on the ground. He feels foggy in his thoughts    Augie Dickerson MD response to concerns.  How is he vitally?    low 100s tachy, breathing is WNL, O2 is good above 97%, last pressure was 97/69 after him changing positions. He talks but gets confused what he is trying to say.  Pt is lethargic unable to sustain full conversation or find a position of comfort.      Jeannine Kirby RN  05/05/25 [id number]

## 2025-05-06 NOTE — CONSULTS
NEPHROLOGY NEW CONSULT NOTE   Cam Evans   59 y.o.    @WT@  MRN/Room: [id number]/[id number]/EPOD01    Reason for consult: Eval for acute HD in I/s/o acute uremia    HPI:  Cam Evans is a 59 y.o. male with a past medical hx of HFrEF (EF 20% on 2021), iron deficiency anemia, HTN, and HLD  Nephrology consulted for eval of acute HD I/s/o elevated BUN.    Pt reportedly had noted generalized weakness and poor appetite over the past week, as well as MORIN and presented to PCP with persistence of poor PO intake and easy fatigability and was sent to ED for further eval    On initial eval, RFP notable for BUN of 203 and Cr 7.93 (last known BUN 15 and Cr 1.2 in 05/2024) , as well as non-con CT  C/A/P with enlarged liver and lesions c/f malignancy. Haptoglobin was elevated at 389 and uric acid elevated 21.4     VBG on initial presentation: pH 7.30, pCO2 27, pO2 55, HCO3 13.3, Lactate 1.2 and initial RFP with Na 133, Cl 93, HCO3 12, Alb 3.8 with AG 28.9 with delta gap 16    CK 77    Denies prior hx of kidney disease or known family hx of kidney disease.     Nephrology was consulted overnight and pt was started on NaHCO3 gtt over night         In The ER: BP (!) 126/115   Pulse (!) 105   Temp 36.4 °C (97.5 °F) (Temporal)   Resp 16   Ht 1.829 m (6')   Wt 99.8 kg (220 lb)   SpO2 99%   BMI 29.84 kg/m²      Medical History[1]   Surgical History[2]   Family History[3]  Social History     Socioeconomic History    Marital status: Single     Spouse name: Not on file    Number of children: Not on file    Years of education: Not on file    Highest education level: Not on file   Occupational History    Not on file   Tobacco Use    Smoking status: Not on file    Smokeless tobacco: Not on file   Substance and Sexual Activity    Alcohol use: Not on file    Drug use: Not on file    Sexual activity: Not on file   Other Topics Concern    Not on file   Social History Narrative    Not on file     Social Drivers of Health     Financial Resource  Strain: Not on File (2019)    Received from [email or web address]    Financial Resource Strain     Financial Resource Strain: 0   Food Insecurity: Not on File (2024)    Received from [email or web address]    Food Insecurity     Food: 0   Transportation Needs: Not on File (2019)    Received from [email or web address]    Transportation Needs     Transportation: 0   Physical Activity: Not on File (2019)    Received from [email or web address]    Physical Activity     Physical Activity: 0   Stress: Not on File (2019)    Received from [email or web address]    Stress     Stress: 0   Social Connections: Not on File (2024)    Received from [email or web address]    Social Connections     Connectedness: 0   Intimate Partner Violence: Not on file   Housing Stability: Not on File (2019)    Received from [email or web address]    Housing Stability     Housin       Allergies[4]     Prescriptions Prior to Admission[5]     Meds:   aspirin, 81 mg, Daily  atorvastatin, 40 mg, Nightly  ferrous sulfate, 65 mg of iron, Daily with breakfast  [Held by provider] heparin (porcine), 5,000 Units, q8h ANTONIO  insulin lispro, 0-5 Units, TID AC  perflutren lipid microspheres, 0.5-10 mL of dilution, Once in imaging  polyethylene glycol, 17 g, Daily  sennosides-docusate sodium, 2 tablet, BID      sodium bicarbonate 1 mEq/mL (8.4 %) 150 mEq in dextrose 5% 1,150 mL infusion, Last Rate: 75 mL/hr (258)      dextrose, 12.5 g, q15 min PRN  dextrose, 25 g, q15 min PRN  glucagon, 1 mg, q15 min PRN  glucagon, 1 mg, q15 min PRN        Vitals:    25 0600   BP: (!) 126/115   Pulse: (!) 105   Resp: 16   Temp:    SpO2: 99%         1900 -  0659  In: -   Out: 50 [Urine:50] charted ON  Weight change:  nor recorded    General appearance: AAOx3. No distress  Eyes: non-icteric  Skin: no apparent rash  Heart: Tachycardic, irregular regular rhythm. Normal S1/S2, No rubs heard  Lungs: CTA bilat.  No wheezing/crackles heard b/l   Abdomen: Distended and somewhat protuberant but soft, nontender to palpation   Extremities:  No edema of LE's b/l   : No Reynaga  Neuro: No FND  ACCESS: 20G pIV x2      ASSESSMENT:  Cam Evans is a  59 y.o.  Year old , with PMHx of  HFrEF (EF 20% on 2021), iron deficiency anemia, HTN, and HLD who is admitted for further workup and eval of VERO, uremia, and worsening fatigue and MORIN.    Nephrology is consulted for eval of VERO and elevated BUN and eval for possible HD, and found to have hyperuricemia, hyperphosphatemia, and elevated LDH c/f possible TLS       #Renal Impairment, VERO  #Azotemia   - UOP seems to improving this morning   - Last Cr 1.2, BUN 15 in 04/2024  - Etiology of VERO likely multifactorial. May have prerenal component given poor PO intake PTA. Other intrarenal causes including Glomerular and Tubulointerstitial are possible.   - On admission Cr 7.9, , HCO3 12  -Had dark stools overnight though pt chronically on iron supplement so more likely to be from   - U/A: Trace Blood, WBC  - Does not appear to be on any medications that made induce VERO   - Clinical volume status: appears euvolemic on exam   - Imaging with no renal/urinary obstruction  -  Electrolytes: Phosphate markedly elevated to 10.0 on evening of presentatation. K was stable on presentation though elevated to >6 this AM though samples were hemolyzed   - CT abdomen without evidence of hydronephrosis or urinary obstruction     #Hyperuricemia  #Hyperphosphatemia   #Elevated LDH   - Hyperuricemia and hyperphosphatemia I/s/o CT imaging findings showing L-sided rectal mass and possible liver and spinal mets are c/f possible TLS and biochemically pt meets criteria for TLS though. Though K was elevated on hemolyzed on multiple RFP samples, though this may be indicative possible hemolysis   - LDH may be elevated I/s/o TLS vs possible  component of underlying hemolysis     #HAGMA (resolved)   - Does not have prior hx of DM (A1c this admission 5.5)  - Beta hydrobutyrate elevated to 1.98  - Initially presented with AG 28.9 with delta  gap 16, likely 2/2 renal insufficiency   - No prior hx of dx of DM,  and not on any SGLT-2i or anti-hyperglycemics prior to admission   - Was started on NaHCO3 gtt overnight for HAGMA     Recommendations:  - Please initiate Rasburicase for hyperuricemia as this patient biochemically meets criteria for TLS.   -Would give additional IVF resuscitation though caution given pt's hx of HFrEF  -Get repeat RFP & Uric Acid level 2 hours after giving rasburicase, and if no improvement  in Uric Acid, K, and Phos, despite rasburicase and adequate IVF resuscitation would consider placement of temporary HD catheter and initiation of HD to clear uric acid and reduce further tubular damage   - Indication for dialysis:  no urgent indication for HD presently though if  Uric Acid Phosphate and K still persistently elevated pt receives adequate IVF resuscitation   - Agree with repeat echo   - Agree with holding home losartan and spironolactone I/s/o VERO   - Avoid nephrotoxins, contrast if possible  - strict Is/Os  - Renal dosing for medications for latest eGFR, follow medication trough as appropriate  - Avoid hypotension/rapid fluctuations in BP's    Sis Sandy MD  Internal Medicine Resident   24 hour Renal Pager - [phone]    Discussed with attending nephrologist, Dr. David          [1] No past medical history on file.  [2] No past surgical history on file.  [3] No family history on file.  [4] No Known Allergies  [5] (Not in a hospital admission)

## 2025-05-06 NOTE — CARE PLAN
a 59-year-old male with PMHx of CHF (EF 20% 2021), iron deficiency anemia, HTN, and hypercholesterolemia. Presented with Generalized weakness, exertional SOB, abdominal pain. Admitted with Liver lesions & VERO. Nephrology consult for Renal Impairment    #Renal Impairment/Possible VERO  - Last Cr in April 2024 was 1.2  - Admission Cr 7.9, , HCO3 12  - Given 2L IV Fluids in ED (LR & N/S)  - U/A: Trace Blood, WBC  - Contrast 5/5  - Imaging with no obstruction  - Etiology of VERO difficult to determine. Prerenal is possible, Postrenal ruled out. Renal causes including Glomerular and Tubulointerstitial are possible. CRS unlikely.    #HFrEF, LVEF 20%    #High BUN  - Per primary team, No AMS, No Asterexis  - No pericardial effusion or rub    #Metabolic parameters  - HAGMA, likely Renal Impairment    ** Recommendation:  - Bicarb drip is reasonable, close observation of volume status  - Please do CK, Hapto, LDH (Considering Hematuria without RBCs)  - Please do Serum light chains, SPEP & UPEP with Immunofixation (Considering High Serum protein as well as some lytic bone lesions)  - Adjust medication doses for eGFR  - Avoid Nephrotoxins  - Strict I/O chart & Daily weight  - Full consult note to follow

## 2025-05-06 NOTE — SIGNIFICANT EVENT
Senior Staffing Note    59-year-old male with past medical history of CHF (EF 20% 2021), iron deficiency anemia, HTN, and hypercholesterolemia who presented today in our ED for generalized body weakness.   1 month prior to admission, he started to have constipation associated with bloatedness. Per patient every time he eats, he gets full right away and give him abdominal discomfort. With this issues he started to have decreased oral intake. He endorses that he has occasional blood streak on his stool that he attributes to constipation. Of note he had no screening colonoscopy before. Patient has no history of cancer and no family hx as well.     1 week ago, he started to have SOB on exertion and easy fatigability. He still able to drink fluids and endorses no changes on his urine output. Denies weight gain or swelling of his extremities. Today he was seen by PCP at OSH and with this persistence of poor oral intake and easy fatigability, he was sent to our ED for further evaluation and management.     A full 12pt ROS was completed and otherwise negative except noted per HPI.      General: No acute distress, appropriate conversation  HEENT:  Normocephalic/atraumatic, EOMI, PERRL, oral cavity clear of lesions, MMM, nares patent  Neck:  No JVD detectable, no LAD  Cardiac:  RRR, no m/r/g No friction rub  Pulm:  CTAB, equal and bilateral chest rise  GI: soft, nontender, nondistended, +BS, no HSM  Extremities:  No edema noted, no chronic scars noted + direct tenderness on the left lower lumbar area.   Neuro:  AOx3, preserved strength in upper and lower extremities, preserved sensation, CN2-12 intact , No asterexis    A/P   59-year-old male with past medical history of CHF (EF 20% 2021), iron deficiency anemia, HTN, and hypercholesterolemia who is admitted for Acute renal failure. Not sure what his baseline creatinine but his creatinine on admission 7 and . Patient has no oliguria or anuria. Patient looks euvolemic on  examination. His pH on VBG 7.3 and HCO3 12. No hyperkalemia but noted hypermagnesemia. EKG showed sinus tachycardia with qtc of 451 ms. Urine drug screen ordered. Urine electrolytes and urea. Nephrology was consulted and agreed to start HCO3 gtt. No alteration on mental status, no friction rub and no asterixis.  No acute HD indications for now. Patient has no hx of DM and not on SGLT-2 inhibitors. His AG was 28 and Beta H butyrate was 1.8. Patient is not hyperglycemic. Most likely this is most likely from starvation ketosis. Will admit under telemetry. Also CT A/P was done in the ED and findings have showed 3 x 3 cm in the rectum concerning for malignancy. There is also noted liver mets and spine mets. Consult IR and GI tomorrow in the AM. Last echo was 2021. Repeat echo ordered.     Please see Dr Blanco H and P for the complete assessment and plan.    Patient will be staffed with the attending tomorrow.

## 2025-05-06 NOTE — H&P (VIEW-ONLY)
Gastroenterology Consult Service INITIAL CONSULT Note  Department of Gastroenterology & Hepatology  [facility]    [facility]  May 6, 2025   Patient: Cam Evans    Medical Record: [id number]    Reason for Consult: rectal mass on imaging  Requesting Service: Carissa    SUBJECTIVE     History Of Present Illness  Cam Evans is a 59 y.o. male with a past medical history of HFrEF (EF 25%), HTN and HLD admitted on 5/5/2025 with back pain, hematochezia and new bloody stools. He was found to have new spontaneous TLS and renal failure with imaging evidence of widespread malignancy (liver and bone) GI is consulted for rectal mass noted on imaging.    Patient noted one day of bloody stools. He denies constipation and is having daily bowel movements. He denies abdominal pain, nausea and vomiting. He is feeling generally unwell with malaise and fatigue. He denies personal or family history of CRC, IBD, liver disease.     Review of Systems: negative except as per HPI     Past Medical History:  Medical History[1]    Home Medications  Prescriptions Prior to Admission[2]    Surgical History:  Surgical History[3]    Allergies:  Allergies[4]    Social History:    Social History     Socioeconomic History    Marital status: Single     Spouse name: Not on file    Number of children: Not on file    Years of education: Not on file    Highest education level: Not on file   Occupational History    Not on file   Tobacco Use    Smoking status: Not on file    Smokeless tobacco: Not on file   Substance and Sexual Activity    Alcohol use: Not on file    Drug use: Not on file    Sexual activity: Not on file   Other Topics Concern    Not on file   Social History Narrative    Not on file     Social Drivers of Health     Financial Resource Strain: Not on File (8/24/2019)    Received from [facility]    Financial Resource Strain     Financial Resource Strain: 0   Food Insecurity: Not on File (9/26/2024)     Received from [facility]    Food Insecurity     Food: 0   Transportation Needs: Not on File (2019)    Received from [facility]    Transportation Needs     Transportation: 0   Physical Activity: Not on File (2019)    Received from [facility]    Physical Activity     Physical Activity: 0   Stress: Not on File (2019)    Received from [facility]    Stress     Stress: 0   Social Connections: Not on File (2024)    Received from [facility]    Social Connections     Connectedness: 0   Intimate Partner Violence: Not on file   Housing Stability: Not on File (2019)    Received from [facility]    Housing Stability     Housin       Family History:  Family History[5]    OBJECTIVE     Vitals:    Vitals:    25 0600 25 0824 25 1248 25 1506   BP: (!) 126/115 90/61 98/74 100/74   BP Location:  Right arm Right arm Right arm   Patient Position:  Lying Lying Lying   Pulse: (!) 105 (!) 112 (!) 105 (!) 106   Resp: 16 16 16 16   Temp:  36 °C (96.8 °F) 36.7 °C (98.1 °F) 36.5 °C (97.7 °F)   TempSrc:  Temporal Temporal Temporal   SpO2: 99% 96% 96% 97%   Weight:       Height:         Failed to redirect to the Timeline version of the [employer] SmartLink.    Intake/Output Summary (Last 24 hours) at 2025 1520  Last data filed at 2025 1530  Gross per 24 hour   Intake --   Output 50 ml   Net -50 ml         Physical Exam  General: chronically ill appearing, no acute distress  HEENT: EOM intact, no scleral icterus, moist MM  Respiratory: nonlabored breathing on room air  Cardiovascular: tachycardia  Abdomen: Soft, nontender, nondistended  Rectal: no external masses or lesions, BRENDEN limited due to pain but mass palpable on insertion.  Extremities: no edema, no asterixis  Neuro: alert and oriented, moves all 4 extremities with no focal deficits  Psych: calm and cooperative    Medications  Current Medications[6]     Labs  Lab Results   Component Value Date    WBC 10.2 2025    HGB 10.4 (L) 2025    HCT 31.4 (L)  "05/06/2025    MCV 81 05/06/2025     05/06/2025     Lab Results   Component Value Date    GLUCOSE 133 (H) 05/06/2025    CALCIUM 9.6 05/06/2025     (L) 05/06/2025    K 4.2 05/06/2025    CO2 20 (L) 05/06/2025    CL 98 05/06/2025     (HH) 05/06/2025    CREATININE 4.44 (H) 05/06/2025     Lab Results   Component Value Date    ALT 6 (L) 05/05/2025    AST 6 (L) 05/05/2025    ALKPHOS 175 (H) 05/05/2025    BILITOT 0.5 05/05/2025     No results found for: \"INR\", \"PROTIME\"    Imaging:   CT CHEST ABDOMEN PELVIS WO CONTRAST 5/5/25  1. 3.0 x 3.0 cm soft tissue mass abutting the left side of the rectum with perirectal adenopathy, concerning for malignancy with metastatic disease. PET/CT can be obtained for further evaluation.  2. Hepatomegaly. Multiple ill-defined hypodense masses at the liver, consistent with metastatic disease.  3. Diffuse sclerotic appearance of T8 vertebral body. Sclerotic lesions at the ribs with expansile lesion at the posterior left 5th rib. Lytic and sclerotic lesions at the spine and at the pelvis. Findings are probably representing osseous metastatic disease.  4. Mild colonic diverticulosis.  5. Prostatomegaly.  6. Small hiatal hernia.  7. Cardiomegaly. Dilated main pulmonary artery, please correlate for pulmonary arterial hypertension.  8. Mild right basilar atelectasis.    GI Procedures:  No results found for this or any previous visit from the past 1825 days.    ASSESSMENT/PLAN:  Cam Evans is a 59 y.o. male admitted on 5/5/2025 with TLS and renal failure. GI is consulted for endoscopic evaluation of rectal mass noted on CT scan. It is not clear if the lesion is within the rectum or adjacent to it, but this will be clear on endoscopy.    Recommendations:  -Will proceed with flex sig , tentatively 5/7/25  -Patient will need 2 tap water enemas prior to procedure. Endoscopy nurses will notify bedside nurses on timing of administration  -Please keep patient NPO at 0001 " 5/7/25  -Management of TLS and renal failure per primary team    Patient was seen and discussed with Dr. Gaxiola.    Recommendations communicated with the primary team via secure chat.  Thank you for the consultation.  GI will continue to follow.  Please do not hesitate to contact us again on Epic Chat or by paging the consultation team at [phone] between the weekday hours of 7 AM - 5 PM.  If there is an urgent concern during the weekend, after-hours, or holidays; then please page the [place] fellow [place] [phone]. Thank you.      Juhi Dennison MD  Gastroenterology and Hepatology Fellow  [facility]          [1] No past medical history on file.  [2] (Not in a hospital admission)  [3] No past surgical history on file.  [4] No Known Allergies  [5] No family history on file.  [6]   Current Facility-Administered Medications:     aspirin chewable tablet 81 mg, 81 mg, oral, Daily, Clive Garrison MD, 81 mg at 05/06/25 0859    atorvastatin (Lipitor) tablet 40 mg, 40 mg, oral, Nightly, Clive Garrison MD, 40 mg at 05/05/25 2143    dextrose 50 % injection 12.5 g, 12.5 g, intravenous, q15 min PRN, Clive Garrison MD    dextrose 50 % injection 25 g, 25 g, intravenous, q15 min PRN, Clive Garrison MD    ferrous sulfate tablet 325 mg, 65 mg of iron, oral, Daily with breakfast, Clive Garrison MD, 325 mg at 05/06/25 0858    glucagon (Glucagen) injection 1 mg, 1 mg, intramuscular, q15 min PRN, Clive Garrison MD    glucagon (Glucagen) injection 1 mg, 1 mg, intramuscular, q15 min PRN, Clive Garrison MD    [Held by provider] heparin (porcine) injection 5,000 Units, 5,000 Units, subcutaneous, q8h ANTONIO, Clive Garrison MD    insulin lispro injection 0-5 Units, 0-5 Units, subcutaneous, TID AC, Clive Garrison MD    polyethylene glycol (Glycolax, Miralax) packet 17 g, 17 g, oral, Daily, Clive Garrison MD    sennosides-docusate sodium (Earnestine-Colace) 8.6-50 mg per tablet 2 tablet, 2 tablet, oral, BID,  Clive Garrison MD, 2 tablet at 05/06/25 0858    sodium chloride 0.9% infusion, 100 mL/hr, intravenous, Continuous, Dora Nelson MD, Last Rate: 100 mL/hr at 05/06/25 1212, 100 mL/hr at 05/06/25 1212    Current Outpatient Medications:     aspirin 81 mg EC tablet, Take 1 tablet (81 mg) by mouth once daily., Disp: , Rfl:     atorvastatin (Lipitor) 40 mg tablet, Take 1 tablet (40 mg) by mouth once daily., Disp: , Rfl:     cholecalciferol (Vitamin D-3) 125 mcg (5,000 units) tablet, Take 1 tablet (125 mcg) by mouth once daily., Disp: , Rfl:     ferrous sulfate 325 mg (65 mg elemental) tablet, Take 1 tablet (325 mg) by mouth once daily with breakfast., Disp: , Rfl:     loratadine (Claritin) 10 mg tablet, Take 1 tablet (10 mg) by mouth once daily as needed., Disp: , Rfl:     losartan (Cozaar) 100 mg tablet, Take 1 tablet (100 mg) by mouth once daily., Disp: , Rfl:     metoprolol succinate XL (Toprol-XL) 100 mg 24 hr tablet, Take 1 tablet (100 mg) by mouth once daily., Disp: , Rfl:     spironolactone (Aldactone) 50 mg tablet, Take 1 tablet (50 mg) by mouth once daily., Disp: , Rfl:

## 2025-05-06 NOTE — PROGRESS NOTES
Pharmacy Medication History Review    Cam Evans is a 59 y.o. male admitted for Acute renal failure, unspecified acute renal failure type. Pharmacy reviewed the patient's [id number] medications and allergies for accuracy.    Medications ADDED:  All medications listed   Medications CHANGED:  None   Medications REMOVED:   None      The list below reflects the updated PTA list.   Prior to Admission Medications   Prescriptions Last Dose Informant   aspirin 81 mg EC tablet 5/4/2025 Self   Sig: Take 1 tablet (81 mg) by mouth once daily.   atorvastatin (Lipitor) 40 mg tablet 5/4/2025 Self   Sig: Take 1 tablet (40 mg) by mouth once daily.   cholecalciferol (Vitamin D-3) 125 mcg (5,000 units) tablet 5/4/2025 Self   Sig: Take 1 tablet (125 mcg) by mouth once daily.   ferrous sulfate 325 mg (65 mg elemental) tablet 5/4/2025 Self   Sig: Take 1 tablet (325 mg) by mouth once daily with breakfast.   loratadine (Claritin) 10 mg tablet Past Week Self   Sig: Take 1 tablet (10 mg) by mouth once daily as needed.   losartan (Cozaar) 100 mg tablet 5/4/2025 Self   Sig: Take 1 tablet (100 mg) by mouth once daily.   metoprolol succinate XL (Toprol-XL) 100 mg 24 hr tablet 5/4/2025 Self   Sig: Take 1 tablet (100 mg) by mouth once daily.   spironolactone (Aldactone) 50 mg tablet 5/4/2025 Self   Sig: Take 1 tablet (50 mg) by mouth once daily.      Facility-Administered Medications: None        The list below reflects the updated allergy list. Please review each documented allergy for additional clarification and justification.  Allergies  Reviewed by Yoav Doty on 5/5/2025   No Known Allergies         Patient accepts M2B at discharge.     Sources:   5/5/2025 [facility] clinical summary   Patient interview   OARRS ( none )     Additional Comments:  Patient has home medications present at bedside - patient can answer questions about home medication therapies       Yoav Doty  Pharmacy Technician  05/05/25     Secure Chat preferred  "  If no response call [phone] or Vocera \"Med Rec\"   "

## 2025-05-06 NOTE — CONSULTS
Gastroenterology Consult Service INITIAL CONSULT Note  Department of Gastroenterology & Hepatology  [facility]    [facility]  May 6, 2025   Patient: Cam Evans    Medical Record: [id number]    Reason for Consult: rectal mass on imaging  Requesting Service: Carissa    SUBJECTIVE     History Of Present Illness  Cam Evans is a 59 y.o. male with a past medical history of HFrEF (EF 25%), HTN and HLD admitted on 5/5/2025 with back pain, hematochezia and new bloody stools. He was found to have new spontaneous TLS and renal failure with imaging evidence of widespread malignancy (liver and bone) GI is consulted for rectal mass noted on imaging.    Patient noted one day of bloody stools. He denies constipation and is having daily bowel movements. He denies abdominal pain, nausea and vomiting. He is feeling generally unwell with malaise and fatigue. He denies personal or family history of CRC, IBD, liver disease.     Review of Systems: negative except as per HPI     Past Medical History:  Medical History[1]    Home Medications  Prescriptions Prior to Admission[2]    Surgical History:  Surgical History[3]    Allergies:  Allergies[4]    Social History:    Social History     Socioeconomic History    Marital status: Single     Spouse name: Not on file    Number of children: Not on file    Years of education: Not on file    Highest education level: Not on file   Occupational History    Not on file   Tobacco Use    Smoking status: Not on file    Smokeless tobacco: Not on file   Substance and Sexual Activity    Alcohol use: Not on file    Drug use: Not on file    Sexual activity: Not on file   Other Topics Concern    Not on file   Social History Narrative    Not on file     Social Drivers of Health     Financial Resource Strain: Not on File (8/24/2019)    Received from [facility]    Financial Resource Strain     Financial Resource Strain: 0   Food Insecurity: Not on File (9/26/2024)     Received from [facility]    Food Insecurity     Food: 0   Transportation Needs: Not on File (2019)    Received from [facility]    Transportation Needs     Transportation: 0   Physical Activity: Not on File (2019)    Received from [facility]    Physical Activity     Physical Activity: 0   Stress: Not on File (2019)    Received from [facility]    Stress     Stress: 0   Social Connections: Not on File (2024)    Received from [facility]    Social Connections     Connectedness: 0   Intimate Partner Violence: Not on file   Housing Stability: Not on File (2019)    Received from [facility]    Housing Stability     Housin       Family History:  Family History[5]    OBJECTIVE     Vitals:    Vitals:    25 0600 25 0824 25 1248 25 1506   BP: (!) 126/115 90/61 98/74 100/74   BP Location:  Right arm Right arm Right arm   Patient Position:  Lying Lying Lying   Pulse: (!) 105 (!) 112 (!) 105 (!) 106   Resp: 16 16 16 16   Temp:  36 °C (96.8 °F) 36.7 °C (98.1 °F) 36.5 °C (97.7 °F)   TempSrc:  Temporal Temporal Temporal   SpO2: 99% 96% 96% 97%   Weight:       Height:         Failed to redirect to the Timeline version of the [employer] SmartLink.    Intake/Output Summary (Last 24 hours) at 2025 1520  Last data filed at 2025 1530  Gross per 24 hour   Intake --   Output 50 ml   Net -50 ml         Physical Exam  General: chronically ill appearing, no acute distress  HEENT: EOM intact, no scleral icterus, moist MM  Respiratory: nonlabored breathing on room air  Cardiovascular: tachycardia  Abdomen: Soft, nontender, nondistended  Rectal: no external masses or lesions, BRENDEN limited due to pain but mass palpable on insertion.  Extremities: no edema, no asterixis  Neuro: alert and oriented, moves all 4 extremities with no focal deficits  Psych: calm and cooperative    Medications  Current Medications[6]     Labs  Lab Results   Component Value Date    WBC 10.2 2025    HGB 10.4 (L) 2025    HCT 31.4 (L)  "05/06/2025    MCV 81 05/06/2025     05/06/2025     Lab Results   Component Value Date    GLUCOSE 133 (H) 05/06/2025    CALCIUM 9.6 05/06/2025     (L) 05/06/2025    K 4.2 05/06/2025    CO2 20 (L) 05/06/2025    CL 98 05/06/2025     (HH) 05/06/2025    CREATININE 4.44 (H) 05/06/2025     Lab Results   Component Value Date    ALT 6 (L) 05/05/2025    AST 6 (L) 05/05/2025    ALKPHOS 175 (H) 05/05/2025    BILITOT 0.5 05/05/2025     No results found for: \"INR\", \"PROTIME\"    Imaging:   CT CHEST ABDOMEN PELVIS WO CONTRAST 5/5/25  1. 3.0 x 3.0 cm soft tissue mass abutting the left side of the rectum with perirectal adenopathy, concerning for malignancy with metastatic disease. PET/CT can be obtained for further evaluation.  2. Hepatomegaly. Multiple ill-defined hypodense masses at the liver, consistent with metastatic disease.  3. Diffuse sclerotic appearance of T8 vertebral body. Sclerotic lesions at the ribs with expansile lesion at the posterior left 5th rib. Lytic and sclerotic lesions at the spine and at the pelvis. Findings are probably representing osseous metastatic disease.  4. Mild colonic diverticulosis.  5. Prostatomegaly.  6. Small hiatal hernia.  7. Cardiomegaly. Dilated main pulmonary artery, please correlate for pulmonary arterial hypertension.  8. Mild right basilar atelectasis.    GI Procedures:  No results found for this or any previous visit from the past 1825 days.    ASSESSMENT/PLAN:  Cam Evans is a 59 y.o. male admitted on 5/5/2025 with TLS and renal failure. GI is consulted for endoscopic evaluation of rectal mass noted on CT scan. It is not clear if the lesion is within the rectum or adjacent to it, but this will be clear on endoscopy.    Recommendations:  -Will proceed with flex sig , tentatively 5/7/25  -Patient will need 2 tap water enemas prior to procedure. Endoscopy nurses will notify bedside nurses on timing of administration  -Please keep patient NPO at 0001 " 5/7/25  -Management of TLS and renal failure per primary team    Patient was seen and discussed with Dr. Gaxiola.    Recommendations communicated with the primary team via secure chat.  Thank you for the consultation.  GI will continue to follow.  Please do not hesitate to contact us again on Epic Chat or by paging the consultation team at [phone] between the weekday hours of 7 AM - 5 PM.  If there is an urgent concern during the weekend, after-hours, or holidays; then please page the on-call GI fellow at [phone]. Thank you.      Juhi Dennison MD  Gastroenterology and Hepatology Fellow  [facility]          [1] No past medical history on file.  [2] (Not in a hospital admission)  [3] No past surgical history on file.  [4] No Known Allergies  [5] No family history on file.  [6]   Current Facility-Administered Medications:     aspirin chewable tablet 81 mg, 81 mg, oral, Daily, Clive Garrison MD, 81 mg at 05/06/25 0859    atorvastatin (Lipitor) tablet 40 mg, 40 mg, oral, Nightly, Clive Garrison MD, 40 mg at 05/05/25 2143    dextrose 50 % injection 12.5 g, 12.5 g, intravenous, q15 min PRN, Clive Garrison MD    dextrose 50 % injection 25 g, 25 g, intravenous, q15 min PRN, Clive Garrison MD    ferrous sulfate tablet 325 mg, 65 mg of iron, oral, Daily with breakfast, Clive Garrison MD, 325 mg at 05/06/25 0858    glucagon (Glucagen) injection 1 mg, 1 mg, intramuscular, q15 min PRN, Clive Garrison MD    glucagon (Glucagen) injection 1 mg, 1 mg, intramuscular, q15 min PRN, Clive Garrison MD    [Held by provider] heparin (porcine) injection 5,000 Units, 5,000 Units, subcutaneous, q8h ANTONIO, Clive Garrison MD    insulin lispro injection 0-5 Units, 0-5 Units, subcutaneous, TID AC, Clive Garrison MD    polyethylene glycol (Glycolax, Miralax) packet 17 g, 17 g, oral, Daily, Clive Garrison MD    sennosides-docusate sodium (Earnestine-Colace) 8.6-50 mg per tablet 2 tablet, 2 tablet, oral, BID,  Clive Garrison MD, 2 tablet at 05/06/25 0858    sodium chloride 0.9% infusion, 100 mL/hr, intravenous, Continuous, Dora Nelson MD, Last Rate: 100 mL/hr at 05/06/25 1212, 100 mL/hr at 05/06/25 1212    Current Outpatient Medications:     aspirin 81 mg EC tablet, Take 1 tablet (81 mg) by mouth once daily., Disp: , Rfl:     atorvastatin (Lipitor) 40 mg tablet, Take 1 tablet (40 mg) by mouth once daily., Disp: , Rfl:     cholecalciferol (Vitamin D-3) 125 mcg (5,000 units) tablet, Take 1 tablet (125 mcg) by mouth once daily., Disp: , Rfl:     ferrous sulfate 325 mg (65 mg elemental) tablet, Take 1 tablet (325 mg) by mouth once daily with breakfast., Disp: , Rfl:     loratadine (Claritin) 10 mg tablet, Take 1 tablet (10 mg) by mouth once daily as needed., Disp: , Rfl:     losartan (Cozaar) 100 mg tablet, Take 1 tablet (100 mg) by mouth once daily., Disp: , Rfl:     metoprolol succinate XL (Toprol-XL) 100 mg 24 hr tablet, Take 1 tablet (100 mg) by mouth once daily., Disp: , Rfl:     spironolactone (Aldactone) 50 mg tablet, Take 1 tablet (50 mg) by mouth once daily., Disp: , Rfl:

## 2025-05-06 NOTE — PROGRESS NOTES
Internal Medicine Progress Note    Cam Evans is a 59 y.o. male on hospital day 1 of admission presenting with Acute renal failure, unspecified acute renal failure type.    Subjective   NAEON. Patient reports no significant changes in his condition this morning. Endorses continued constipation. Reports good urine output and denies significant changes in UOP over the past few weeks. Reports he has been feeling tired, stressed, and scared leading him to consume less food and water over the past few weeks as well.        Objective     Physical Exam  Vitals reviewed.   Constitutional:       General: He is awake. He is not in acute distress.     Appearance: Normal appearance.   Eyes:      Extraocular Movements: Extraocular movements intact.   Cardiovascular:      Rate and Rhythm: Normal rate and regular rhythm.      Heart sounds: Normal heart sounds. No murmur heard.  Pulmonary:      Effort: Pulmonary effort is normal. No respiratory distress.      Breath sounds: Normal breath sounds.   Abdominal:      General: Abdomen is flat. Bowel sounds are normal.      Palpations: Abdomen is soft.      Tenderness: There is no abdominal tenderness.   Musculoskeletal:      Right lower leg: No edema.      Left lower leg: No edema.   Skin:     General: Skin is warm and dry.   Neurological:      Mental Status: He is alert. Mental status is at baseline.      Comments: No asterixis present. A&Ox4   Psychiatric:         Behavior: Behavior is cooperative.         Last Recorded Vitals  Blood pressure (!) 126/115, pulse (!) 105, temperature 36.4 °C (97.5 °F), temperature source Temporal, resp. rate 16, height 1.829 m (6'), weight 99.8 kg (220 lb), SpO2 99%.  Intake/Output last 24h:    Intake/Output Summary (Last 24 hours) at 5/6/2025 0817  Last data filed at 5/5/2025 1530  Gross per 24 hour   Intake --   Output 50 ml   Net -50 ml       Relevant Results  Results from last 72 hours   Lab Units 05/06/25  0614 05/06/25  0452 05/06/25  0303  05/05/25  2212 05/05/25  1324 05/05/25  1324   WBC AUTO x10*3/uL  --   --  10.2  --   --  11.9*   HEMOGLOBIN g/dL  --   --  10.4*  --   --  12.1*   HEMATOCRIT %  --   --  31.4*  --   --  34.9*   PLATELETS AUTO x10*3/uL  --   --  442  --   --  519*   SODIUM mmol/L 132* 133* 131* 132*  --  133*   POTASSIUM mmol/L 6.6* 6.1* 8.5* 4.7  --  5.2   CHLORIDE mmol/L 97* 98 97* 96*  --  93*   CO2 mmol/L 18* 16* 13* 12*  --  12*   BUN mg/dL 176* 187* 171* 180*  --  203*   CREATININE mg/dL 5.23* 5.74* 5.75* 6.51*  --  7.93*   GLUCOSE mg/dL 138* 136* 136* 125*  --  92   CALCIUM mg/dL 8.7 8.7 8.3* 9.5  --  10.1   MAGNESIUM mg/dL  --   --  2.84* 2.72*  --  3.10*   PHOSPHORUS mg/dL 8.6* 8.7* 9.1* 10.0*   < >  --    ALBUMIN g/dL 3.1* 3.0* 3.3* 3.5  --  3.8   ALK PHOS U/L  --   --   --   --   --  175*   ALT U/L  --   --   --   --   --  6*   AST U/L  --   --   --   --   --  6*   BILIRUBIN TOTAL mg/dL  --   --   --   --   --  0.5    < > = values in this interval not displayed.     Scheduled medications  Scheduled Medications[1]  Continuous medications  Continuous Medications[2]  PRN medications  PRN Medications[3]         Assessment/Plan   Cam Evans is a 59 y.o. male with PMHx of CHF (EF 20% on 2021), iron deficiency anemia, HTN, and dyslipidemia, presenting with generalized weakness and fatigue and loss of appetite for the past week. Had history of bowel habit change with constipation for the past month. Hypotensive at ED S/P IV LR 2 L. Labs showing acute renal failure with , Cr 7.93, acidosis with HCO3 of 12. CT CAP wo IV contrast showing 3x3 cm soft tissue mass at rectum with multiple ill-defined hypodense mass at liver and diffuse sclerotic vertebral lesion at T8 and left 5th rib, suspected metastatic disease.     Updates 5/6:   - Kidney labs still grossly deranged, but downtrending; renal concerned about uric acid nephropathy, currently considering HD vs rasburicase  - Renal consulted, appreciate recommendations; no  urgent indication for dialysis as of 5/5 PM   - GI consulted for scope/biopsy of rectal mass   - CAA, PSA pending    #Acute renal failure  #Metabolic acidosis 2/2 azotemia vs starvation ketosis  #Elevated uric acid   :: Prerenal azotemia VS ATN: still produce some urine, no asterixis  :: BUN/Cr 203/7.93, no baseline Cr  :: Hypotensive at initial presentation S/P IV LR 2 L  :: UA - pH 5.0, SpGr 1.016, protein trace, WBC 6-10, RBC 1-2  :: VBG pH 7.30, PCO2 27, lactate 1.2, B-hydroxybutyrate level 1.98  :: CT: no hydroureteronephrosis/other obstruction visualized   Plan   - Renal consulted, appreciated recs   - Given concern for uric acid nephropathy and/or TLS (very unlikely per heme fellow, both heme and nephro following; will attempt rasburicase to lower levels but low threshold to initiate dialysis if necessary    - Aggressive fluid repletion - LR 100ml/hr    - If hypotensive, consider bolus IV of  mL   - will follow RFP   - avoid nephrotoxic agents/medications, keep euvolemia and normotensive   - NPO; on hypoglycemic protocol     #Rectal mass, 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  :: history of bowel habit change, constipation past month, along with loss of appetite  :: CT CAP: 3x3 cm soft tissue mass at rectum with multiple ill-defined hypodense mass at liver and diffuse sclerotic vertebral lesion at T8 and left 5th rib, suspected metastatic disease  :: Per chart review, history of Cologuard in 2024, but no colonoscopy on record   Plan   - GI consulted for potential rectal mass biopsy and scope     #History of HFrEF  #HTN  :: Hypo- to euvolemic upon initial assessment, BP 90-100s/60s at ED  Plan   - Hold off spironolactone, losartan in setting of VERO and hypotension   - Will repeat TTE to better estimate LVEF   - will closely monitor fluid given   - closely follow-up I/Os     #Dyslipidemia  - Continue atorvastatin 40 mg daily     #Iron deficiency anemia  - Continue ferrous sulfate  tablet 325 daily     F: IV bicarb drip 75 mL/hr  E: replete prn  N: NPO  A: PIVx1     DVT ppx: Heparin 5000 U subcutaneous q 8 hr  GI ppx: None  Bowel regimen: miralax, pericolace     Code: Full  NOK: Mali Galan (Mother) [phone] (Mobile)          Patient seen and staffed with attending physician. Recommendations not final until attested.     Rod Alford MD  PGY-1, Internal Medicine           [1] aspirin, 81 mg, oral, Daily  atorvastatin, 40 mg, oral, Nightly  ferrous sulfate, 65 mg of iron, oral, Daily with breakfast  [Held by provider] heparin (porcine), 5,000 Units, subcutaneous, q8h ANTONIO  insulin lispro, 0-5 Units, subcutaneous, TID AC  perflutren lipid microspheres, 0.5-10 mL of dilution, intravenous, Once in imaging  polyethylene glycol, 17 g, oral, Daily  sennosides-docusate sodium, 2 tablet, oral, BID  [2] sodium bicarbonate 1 mEq/mL (8.4 %) 150 mEq in dextrose 5% 1,150 mL infusion, 75 mL/hr, Last Rate: 75 mL/hr (05/05/25 213)  [3] PRN medications: dextrose, dextrose, glucagon, glucagon

## 2025-05-07 ENCOUNTER — APPOINTMENT (OUTPATIENT)
Dept: GASTROENTEROLOGY | Facility: HOSPITAL | Age: 60
DRG: 683 | End: 2025-05-07
Payer: MEDICARE

## 2025-05-07 LAB
ACANTHOCYTES BLD QL SMEAR: ABNORMAL
ALBUMIN SERPL BCP-MCNC: 2.4 G/DL (ref 3.4–5)
ALBUMIN SERPL BCP-MCNC: 3.3 G/DL (ref 3.4–5)
ANION GAP SERPL CALC-SCNC: 12 MMOL/L (ref 10–20)
ANION GAP SERPL CALC-SCNC: 14 MMOL/L (ref 10–20)
BASOPHILS # BLD MANUAL: 0.08 X10*3/UL (ref 0–0.1)
BASOPHILS NFR BLD MANUAL: 0.9 %
BUN SERPL-MCNC: 92 MG/DL (ref 6–23)
BUN SERPL-MCNC: 99 MG/DL (ref 6–23)
BURR CELLS BLD QL SMEAR: ABNORMAL
CALCIUM SERPL-MCNC: 6.9 MG/DL (ref 8.6–10.6)
CALCIUM SERPL-MCNC: 8.9 MG/DL (ref 8.6–10.6)
CHLORIDE SERPL-SCNC: 107 MMOL/L (ref 98–107)
CHLORIDE SERPL-SCNC: 112 MMOL/L (ref 98–107)
CO2 SERPL-SCNC: 19 MMOL/L (ref 21–32)
CO2 SERPL-SCNC: 22 MMOL/L (ref 21–32)
CREAT SERPL-MCNC: 1.65 MG/DL (ref 0.5–1.3)
CREAT SERPL-MCNC: 1.91 MG/DL (ref 0.5–1.3)
EGFRCR SERPLBLD CKD-EPI 2021: 40 ML/MIN/1.73M*2
EGFRCR SERPLBLD CKD-EPI 2021: 48 ML/MIN/1.73M*2
EOSINOPHIL # BLD MANUAL: 0.08 X10*3/UL (ref 0–0.7)
EOSINOPHIL NFR BLD MANUAL: 0.9 %
ERYTHROCYTE [DISTWIDTH] IN BLOOD BY AUTOMATED COUNT: 16 % (ref 11.5–14.5)
FERRITIN SERPL-MCNC: 2951 NG/ML (ref 20–300)
FOLATE SERPL-MCNC: >24 NG/ML
G6PD RBC QL: NORMAL
GLUCOSE BLD MANUAL STRIP-MCNC: 103 MG/DL (ref 74–99)
GLUCOSE BLD MANUAL STRIP-MCNC: 105 MG/DL (ref 74–99)
GLUCOSE BLD MANUAL STRIP-MCNC: 112 MG/DL (ref 74–99)
GLUCOSE BLD MANUAL STRIP-MCNC: 96 MG/DL (ref 74–99)
GLUCOSE SERPL-MCNC: 86 MG/DL (ref 74–99)
GLUCOSE SERPL-MCNC: 96 MG/DL (ref 74–99)
HCT VFR BLD AUTO: 26 % (ref 41–52)
HGB BLD-MCNC: 8.5 G/DL (ref 13.5–17.5)
HGB RETIC QN: 30 PG (ref 28–38)
IMM GRANULOCYTES # BLD AUTO: 0.47 X10*3/UL (ref 0–0.7)
IMM GRANULOCYTES NFR BLD AUTO: 5.1 % (ref 0–0.9)
IMMATURE RETIC FRACTION: 11.3 %
IRON SATN MFR SERPL: 32 % (ref 25–45)
IRON SERPL-MCNC: 48 UG/DL (ref 35–150)
LYMPHOCYTES # BLD MANUAL: 0.71 X10*3/UL (ref 1.2–4.8)
LYMPHOCYTES NFR BLD MANUAL: 7.8 %
MAGNESIUM SERPL-MCNC: 1.71 MG/DL (ref 1.6–2.4)
MAGNESIUM SERPL-MCNC: 1.87 MG/DL (ref 1.6–2.4)
MCH RBC QN AUTO: 26.6 PG (ref 26–34)
MCHC RBC AUTO-ENTMCNC: 32.7 G/DL (ref 32–36)
MCV RBC AUTO: 81 FL (ref 80–100)
MONOCYTES # BLD MANUAL: 0.39 X10*3/UL (ref 0.1–1)
MONOCYTES NFR BLD MANUAL: 4.3 %
MYELOCYTES # BLD MANUAL: 0.15 X10*3/UL
MYELOCYTES NFR BLD MANUAL: 1.7 %
NEUTS SEG # BLD MANUAL: 7.68 X10*3/UL (ref 1.2–7)
NEUTS SEG NFR BLD MANUAL: 84.4 %
NRBC BLD-RTO: 0 /100 WBCS (ref 0–0)
PHOSPHATE SERPL-MCNC: 3.2 MG/DL (ref 2.5–4.9)
PHOSPHATE SERPL-MCNC: 3.4 MG/DL (ref 2.5–4.9)
PLATELET # BLD AUTO: 368 X10*3/UL (ref 150–450)
POTASSIUM SERPL-SCNC: 4.1 MMOL/L (ref 3.5–5.3)
POTASSIUM SERPL-SCNC: 4.4 MMOL/L (ref 3.5–5.3)
RBC # BLD AUTO: 3.2 X10*6/UL (ref 4.5–5.9)
RBC MORPH BLD: ABNORMAL
RETICS #: 0.04 X10*6/UL (ref 0.02–0.12)
RETICS/RBC NFR AUTO: 1.2 % (ref 0.5–2)
SODIUM SERPL-SCNC: 139 MMOL/L (ref 136–145)
SODIUM SERPL-SCNC: 139 MMOL/L (ref 136–145)
TIBC SERPL-MCNC: 151 UG/DL (ref 240–445)
TOTAL CELLS COUNTED BLD: 115
UIBC SERPL-MCNC: 103 UG/DL (ref 110–370)
URATE SERPL-MCNC: 1.9 MG/DL (ref 4–7.5)
VIT B12 SERPL-MCNC: 1172 PG/ML (ref 211–911)
WBC # BLD AUTO: 9.1 X10*3/UL (ref 4.4–11.3)

## 2025-05-07 PROCEDURE — 45331 SIGMOIDOSCOPY AND BIOPSY: CPT | Performed by: STUDENT IN AN ORGANIZED HEALTH CARE EDUCATION/TRAINING PROGRAM

## 2025-05-07 PROCEDURE — 84100 ASSAY OF PHOSPHORUS: CPT

## 2025-05-07 PROCEDURE — 85007 BL SMEAR W/DIFF WBC COUNT: CPT

## 2025-05-07 PROCEDURE — 85027 COMPLETE CBC AUTOMATED: CPT

## 2025-05-07 PROCEDURE — 7100000010 HC PHASE TWO TIME - EACH INCREMENTAL 1 MINUTE

## 2025-05-07 PROCEDURE — 36415 COLL VENOUS BLD VENIPUNCTURE: CPT

## 2025-05-07 PROCEDURE — 1200000002 HC GENERAL ROOM WITH TELEMETRY DAILY

## 2025-05-07 PROCEDURE — 82947 ASSAY GLUCOSE BLOOD QUANT: CPT

## 2025-05-07 PROCEDURE — 99233 SBSQ HOSP IP/OBS HIGH 50: CPT

## 2025-05-07 PROCEDURE — 2500000004 HC RX 250 GENERAL PHARMACY W/ HCPCS (ALT 636 FOR OP/ED): Mod: JZ

## 2025-05-07 PROCEDURE — 99152 MOD SED SAME PHYS/QHP 5/>YRS: CPT | Performed by: STUDENT IN AN ORGANIZED HEALTH CARE EDUCATION/TRAINING PROGRAM

## 2025-05-07 PROCEDURE — 84550 ASSAY OF BLOOD/URIC ACID: CPT

## 2025-05-07 PROCEDURE — 2500000004 HC RX 250 GENERAL PHARMACY W/ HCPCS (ALT 636 FOR OP/ED): Performed by: STUDENT IN AN ORGANIZED HEALTH CARE EDUCATION/TRAINING PROGRAM

## 2025-05-07 PROCEDURE — 7100000009 HC PHASE TWO TIME - INITIAL BASE CHARGE

## 2025-05-07 PROCEDURE — 80069 RENAL FUNCTION PANEL: CPT

## 2025-05-07 PROCEDURE — 85045 AUTOMATED RETICULOCYTE COUNT: CPT

## 2025-05-07 PROCEDURE — 83735 ASSAY OF MAGNESIUM: CPT

## 2025-05-07 PROCEDURE — 3700000012 HC SEDATION LEVEL 5+ TIME - INITIAL 15 MINUTES 5/> YEARS

## 2025-05-07 PROCEDURE — 2500000001 HC RX 250 WO HCPCS SELF ADMINISTERED DRUGS (ALT 637 FOR MEDICARE OP)

## 2025-05-07 PROCEDURE — 0DBP8ZX EXCISION OF RECTUM, VIA NATURAL OR ARTIFICIAL OPENING ENDOSCOPIC, DIAGNOSTIC: ICD-10-PCS | Performed by: STUDENT IN AN ORGANIZED HEALTH CARE EDUCATION/TRAINING PROGRAM

## 2025-05-07 RX ORDER — FENTANYL CITRATE 50 UG/ML
INJECTION, SOLUTION INTRAMUSCULAR; INTRAVENOUS AS NEEDED
Status: DISCONTINUED | OUTPATIENT
Start: 2025-05-07 | End: 2025-05-07 | Stop reason: HOSPADM

## 2025-05-07 RX ORDER — SODIUM CHLORIDE, SODIUM LACTATE, POTASSIUM CHLORIDE, CALCIUM CHLORIDE 600; 310; 30; 20 MG/100ML; MG/100ML; MG/100ML; MG/100ML
75 INJECTION, SOLUTION INTRAVENOUS CONTINUOUS
Status: DISCONTINUED | OUTPATIENT
Start: 2025-05-07 | End: 2025-05-07

## 2025-05-07 RX ORDER — TRAZODONE HYDROCHLORIDE 50 MG/1
50 TABLET ORAL ONCE
Status: COMPLETED | OUTPATIENT
Start: 2025-05-07 | End: 2025-05-07

## 2025-05-07 RX ORDER — MIDAZOLAM HYDROCHLORIDE 1 MG/ML
INJECTION, SOLUTION INTRAMUSCULAR; INTRAVENOUS AS NEEDED
Status: DISCONTINUED | OUTPATIENT
Start: 2025-05-07 | End: 2025-05-07 | Stop reason: HOSPADM

## 2025-05-07 RX ADMIN — FENTANYL CITRATE 25 MCG: 50 INJECTION, SOLUTION INTRAMUSCULAR; INTRAVENOUS at 15:22

## 2025-05-07 RX ADMIN — ATORVASTATIN CALCIUM 40 MG: 40 TABLET, FILM COATED ORAL at 22:43

## 2025-05-07 RX ADMIN — TRAZODONE HYDROCHLORIDE 50 MG: 50 TABLET ORAL at 04:51

## 2025-05-07 RX ADMIN — SENNOSIDES AND DOCUSATE SODIUM 2 TABLET: 50; 8.6 TABLET ORAL at 22:42

## 2025-05-07 RX ADMIN — FENTANYL CITRATE 25 MCG: 50 INJECTION, SOLUTION INTRAMUSCULAR; INTRAVENOUS at 15:25

## 2025-05-07 RX ADMIN — SODIUM CHLORIDE, SODIUM LACTATE, POTASSIUM CHLORIDE, AND CALCIUM CHLORIDE 100 ML/HR: .6; .31; .03; .02 INJECTION, SOLUTION INTRAVENOUS at 08:34

## 2025-05-07 RX ADMIN — MIDAZOLAM 1 MG: 1 INJECTION INTRAMUSCULAR; INTRAVENOUS at 15:25

## 2025-05-07 RX ADMIN — MIDAZOLAM 1 MG: 1 INJECTION INTRAMUSCULAR; INTRAVENOUS at 15:28

## 2025-05-07 RX ADMIN — MIDAZOLAM 1 MG: 1 INJECTION INTRAMUSCULAR; INTRAVENOUS at 15:22

## 2025-05-07 SDOH — SOCIAL STABILITY: SOCIAL INSECURITY: DO YOU FEEL ANYONE HAS EXPLOITED OR TAKEN ADVANTAGE OF YOU FINANCIALLY OR OF YOUR PERSONAL PROPERTY?: NO

## 2025-05-07 SDOH — SOCIAL STABILITY: SOCIAL INSECURITY: WITHIN THE LAST YEAR, HAVE YOU BEEN HUMILIATED OR EMOTIONALLY ABUSED IN OTHER WAYS BY YOUR PARTNER OR EX-PARTNER?: NO

## 2025-05-07 SDOH — ECONOMIC STABILITY: INCOME INSECURITY: IN THE PAST 12 MONTHS HAS THE ELECTRIC, GAS, OIL, OR WATER COMPANY THREATENED TO SHUT OFF SERVICES IN YOUR HOME?: NO

## 2025-05-07 SDOH — SOCIAL STABILITY: SOCIAL INSECURITY
WITHIN THE LAST YEAR, HAVE YOU BEEN RAPED OR FORCED TO HAVE ANY KIND OF SEXUAL ACTIVITY BY YOUR PARTNER OR EX-PARTNER?: NO

## 2025-05-07 SDOH — SOCIAL STABILITY: SOCIAL INSECURITY: ARE YOU OR HAVE YOU BEEN THREATENED OR ABUSED PHYSICALLY, EMOTIONALLY, OR SEXUALLY BY ANYONE?: NO

## 2025-05-07 SDOH — ECONOMIC STABILITY: FOOD INSECURITY: WITHIN THE PAST 12 MONTHS, THE FOOD YOU BOUGHT JUST DIDN'T LAST AND YOU DIDN'T HAVE MONEY TO GET MORE.: NEVER TRUE

## 2025-05-07 SDOH — ECONOMIC STABILITY: FOOD INSECURITY: WITHIN THE PAST 12 MONTHS, YOU WORRIED THAT YOUR FOOD WOULD RUN OUT BEFORE YOU GOT THE MONEY TO BUY MORE.: NEVER TRUE

## 2025-05-07 SDOH — SOCIAL STABILITY: SOCIAL INSECURITY
WITHIN THE LAST YEAR, HAVE YOU BEEN KICKED, HIT, SLAPPED, OR OTHERWISE PHYSICALLY HURT BY YOUR PARTNER OR EX-PARTNER?: NO

## 2025-05-07 SDOH — SOCIAL STABILITY: SOCIAL INSECURITY: HAVE YOU HAD THOUGHTS OF HARMING ANYONE ELSE?: NO

## 2025-05-07 SDOH — SOCIAL STABILITY: SOCIAL INSECURITY: HAVE YOU HAD ANY THOUGHTS OF HARMING ANYONE ELSE?: NO

## 2025-05-07 SDOH — SOCIAL STABILITY: SOCIAL INSECURITY: DOES ANYONE TRY TO KEEP YOU FROM HAVING/CONTACTING OTHER FRIENDS OR DOING THINGS OUTSIDE YOUR HOME?: NO

## 2025-05-07 SDOH — SOCIAL STABILITY: SOCIAL INSECURITY: WITHIN THE LAST YEAR, HAVE YOU BEEN AFRAID OF YOUR PARTNER OR EX-PARTNER?: NO

## 2025-05-07 SDOH — SOCIAL STABILITY: SOCIAL INSECURITY: ABUSE: ADULT

## 2025-05-07 SDOH — SOCIAL STABILITY: SOCIAL INSECURITY: ARE THERE ANY APPARENT SIGNS OF INJURIES/BEHAVIORS THAT COULD BE RELATED TO ABUSE/NEGLECT?: NO

## 2025-05-07 SDOH — SOCIAL STABILITY: SOCIAL INSECURITY: DO YOU FEEL UNSAFE GOING BACK TO THE PLACE WHERE YOU ARE LIVING?: NO

## 2025-05-07 SDOH — SOCIAL STABILITY: SOCIAL INSECURITY: HAS ANYONE EVER THREATENED TO HURT YOUR FAMILY OR YOUR PETS?: NO

## 2025-05-07 SDOH — SOCIAL STABILITY: SOCIAL INSECURITY: WERE YOU ABLE TO COMPLETE ALL THE BEHAVIORAL HEALTH SCREENINGS?: YES

## 2025-05-07 ASSESSMENT — COGNITIVE AND FUNCTIONAL STATUS - GENERAL
STANDING UP FROM CHAIR USING ARMS: A LITTLE
TURNING FROM BACK TO SIDE WHILE IN FLAT BAD: A LITTLE
MOVING TO AND FROM BED TO CHAIR: A LITTLE
MOBILITY SCORE: 17
PATIENT BASELINE BEDBOUND: NO
TOILETING: A LITTLE
CLIMB 3 TO 5 STEPS WITH RAILING: A LOT
DAILY ACTIVITIY SCORE: 22
WALKING IN HOSPITAL ROOM: A LOT
HELP NEEDED FOR BATHING: A LITTLE

## 2025-05-07 ASSESSMENT — LIFESTYLE VARIABLES
HOW OFTEN DO YOU HAVE 6 OR MORE DRINKS ON ONE OCCASION: NEVER
AUDIT-C TOTAL SCORE: 0
HOW MANY STANDARD DRINKS CONTAINING ALCOHOL DO YOU HAVE ON A TYPICAL DAY: PATIENT DOES NOT DRINK
HOW OFTEN DO YOU HAVE A DRINK CONTAINING ALCOHOL: NEVER
SKIP TO QUESTIONS 9-10: 1
AUDIT-C TOTAL SCORE: 0

## 2025-05-07 ASSESSMENT — ACTIVITIES OF DAILY LIVING (ADL)
LACK_OF_TRANSPORTATION: YES
ADEQUATE_TO_COMPLETE_ADL: YES
GROOMING: NEEDS ASSISTANCE
DRESSING YOURSELF: INDEPENDENT
TOILETING: NEEDS ASSISTANCE
BATHING: NEEDS ASSISTANCE
LACK_OF_TRANSPORTATION: YES
FEEDING YOURSELF: INDEPENDENT
PATIENT'S MEMORY ADEQUATE TO SAFELY COMPLETE DAILY ACTIVITIES?: YES
WALKS IN HOME: INDEPENDENT
HEARING - RIGHT EAR: FUNCTIONAL
LACK_OF_TRANSPORTATION: YES
JUDGMENT_ADEQUATE_SAFELY_COMPLETE_DAILY_ACTIVITIES: NO
HEARING - LEFT EAR: FUNCTIONAL

## 2025-05-07 ASSESSMENT — PAIN SCALES - GENERAL
PAINLEVEL_OUTOF10: 8
PAINLEVEL_OUTOF10: 0 - NO PAIN
PAINLEVEL_OUTOF10: 1
PAINLEVEL_OUTOF10: 0 - NO PAIN
PAINLEVEL_OUTOF10: 1
PAINLEVEL_OUTOF10: 0 - NO PAIN
PAINLEVEL_OUTOF10: 0 - NO PAIN
PAINLEVEL_OUTOF10: 2
PAINLEVEL_OUTOF10: 0 - NO PAIN

## 2025-05-07 ASSESSMENT — PAIN - FUNCTIONAL ASSESSMENT
PAIN_FUNCTIONAL_ASSESSMENT: 0-10

## 2025-05-07 ASSESSMENT — PATIENT HEALTH QUESTIONNAIRE - PHQ9
1. LITTLE INTEREST OR PLEASURE IN DOING THINGS: NOT AT ALL
2. FEELING DOWN, DEPRESSED OR HOPELESS: NOT AT ALL
SUM OF ALL RESPONSES TO PHQ9 QUESTIONS 1 & 2: 0

## 2025-05-07 NOTE — SIGNIFICANT EVENT
Brief Oncology Note:     Cam Evans is a 59 y.o. male PMHx HFrEF (EF 25% 5/2025), HTN, HLD, anemia who presented with generalized weakness and fatigue, admitted for acute renal failure.      On CT found to have rectal mass, extensive liver mets, sclerotic bone lesions. Overall picture is concerning for metastatic cancer, suspect primary rectal malignancy.     Flex sig performed today with biopsy obtained from rectal mass.   VERO improving after IVF and rasburicase    Recommendations:  -Please obtain MRI abd/pelvis with and without contrast to better visualize liver lesions   -Will discuss with pathology if there is enough tissue for diagnosis and NGS. Can hold off on additional liver biopsy for now   -Patient cannot be discharged until diagnosis is made. If high grade neuroendocrine tumor, he may need inpatient chemotherapy     Patient seen and discussed with Dr. Chay Elizondo MD  Hematology-Oncology Fellow, PGY4  Oncology Consult Pager: [phone]

## 2025-05-07 NOTE — PROGRESS NOTES
NEPHROLOGY FOLLOW UP NOTE    Cam Evans   59 y.o.      MRN/Room: [id number]    Subjective:   Patient seen and evaluated at bedside. At time of eval, patient without any new acute complaints.    Denies any fever/chills, chest pain/discomfort, SOB, orthopnea, new flank pain, abd pain, N/V, LE edema or abd swelling      Objective:     Meds:   allopurinol, 300 mg, Daily  aspirin, 81 mg, Daily  atorvastatin, 40 mg, Nightly  ferrous sulfate, 65 mg of iron, Daily with breakfast  [Held by provider] heparin (porcine), 5,000 Units, q8h ANTONIO  insulin lispro, 0-5 Units, TID AC  polyethylene glycol, 17 g, Daily  sennosides-docusate sodium, 2 tablet, BID      sodium chloride 0.9%, Last Rate: 100 mL/hr (05/07/25 0408)      dextrose, 12.5 g, q15 min PRN  dextrose, 25 g, q15 min PRN  glucagon, 1 mg, q15 min PRN  glucagon, 1 mg, q15 min PRN        Vitals:    05/07/25 0809   BP: 97/72   Pulse: (!) 117   Resp: 20   Temp: 36.6 °C (97.9 °F)   SpO2: 97%          Intake/Output Summary (Last 24 hours) at 5/7/2025 0811  Last data filed at 5/7/2025 0811  Gross per 24 hour   Intake --   Output 1900 ml   Net -1900 ml       General appearance: Awake and alert, oriented x3 . No acute distress  HEENT: NC/AT, moist oral mucosa  Skin: no apparent rash present  Heart: mildy tachycardic, heart sounds 1 & 2 present and normal, no murmurs heard or rubs  Lungs: Adequate air entry, breath sounds CTA b/l.  No wheezing/crackles  Abdomen: distended but soft, non tender to palpation   Extremities: No edema, no joint swelling  Neuro: No FND,  No asterixis   ACCESS: 20G pIVs x2     Blood Labs:  Results for orders placed or performed during the hospital encounter of 05/05/25 (from the past 24 hours)   Transthoracic Echo Complete   Result Value Ref Range    AV pk erma 1.28 m/s    LVOT diam 2.20 cm    Tricuspid annular plane systolic excursion 1.1 cm    LV EF 28 %    LVIDd 5.60 cm    Aortic Valve Area by Continuity of Peak Velocity 2.80 cm2    AV pk  grad 7 mmHg    LV A4C EF 26.6    Blood Gas Venous Full Panel   Result Value Ref Range    POCT pH, Venous 7.40 7.33 - 7.43 pH    POCT pCO2, Venous 31 (L) 41 - 51 mm Hg    POCT pO2, Venous 60 (H) 35 - 45 mm Hg    POCT SO2, Venous 89 (H) 45 - 75 %    POCT Oxy Hemoglobin, Venous 86.8 (H) 45.0 - 75.0 %    POCT Hematocrit Calculated, Venous 34.0 (L) 41.0 - 52.0 %    POCT Sodium, Venous 133 (L) 136 - 145 mmol/L    POCT Potassium, Venous 4.5 3.5 - 5.3 mmol/L    POCT Chloride, Venous 100 98 - 107 mmol/L    POCT Ionized Calicum, Venous 1.17 1.10 - 1.33 mmol/L    POCT Glucose, Venous 138 (H) 74 - 99 mg/dL    POCT Lactate, Venous 1.0 0.4 - 2.0 mmol/L    POCT Base Excess, Venous -4.7 (L) -2.0 - 3.0 mmol/L    POCT HCO3 Calculated, Venous 19.2 (L) 22.0 - 26.0 mmol/L    POCT Hemoglobin, Venous 11.4 (L) 13.5 - 17.5 g/dL    POCT Anion Gap, Venous 18.0 10.0 - 25.0 mmol/L    Patient Temperature 37.0 degrees Celsius    FiO2 21 %   Renal function panel   Result Value Ref Range    Glucose 133 (H) 74 - 99 mg/dL    Sodium 134 (L) 136 - 145 mmol/L    Potassium 4.2 3.5 - 5.3 mmol/L    Chloride 98 98 - 107 mmol/L    Bicarbonate 20 (L) 21 - 32 mmol/L    Anion Gap 20 10 - 20 mmol/L    Urea Nitrogen 165 (HH) 6 - 23 mg/dL    Creatinine 4.44 (H) 0.50 - 1.30 mg/dL    eGFR 14 (L) >60 mL/min/1.73m*2    Calcium 9.6 8.6 - 10.6 mg/dL    Phosphorus 6.5 (H) 2.5 - 4.9 mg/dL    Albumin 3.3 (L) 3.4 - 5.0 g/dL   Magnesium   Result Value Ref Range    Magnesium 2.52 (H) 1.60 - 2.40 mg/dL   Lactate dehydrogenase   Result Value Ref Range     (H) 84 - 246 U/L   Uric Acid   Result Value Ref Range    Uric Acid 20.1 (H) 4.0 - 7.5 mg/dL   POCT GLUCOSE   Result Value Ref Range    POCT Glucose 134 (H) 74 - 99 mg/dL   POCT GLUCOSE   Result Value Ref Range    POCT Glucose 190 (H) 74 - 99 mg/dL   Uric Acid   Result Value Ref Range    Uric Acid 13.2 (H) 4.0 - 7.5 mg/dL   Lactate dehydrogenase   Result Value Ref Range     (H) 84 - 246 U/L   Magnesium   Result  Value Ref Range    Magnesium 2.31 1.60 - 2.40 mg/dL   Renal function panel   Result Value Ref Range    Glucose 185 (H) 74 - 99 mg/dL    Sodium 134 (L) 136 - 145 mmol/L    Potassium 3.9 3.5 - 5.3 mmol/L    Chloride 100 98 - 107 mmol/L    Bicarbonate 21 21 - 32 mmol/L    Anion Gap 17 10 - 20 mmol/L    Urea Nitrogen 157 (HH) 6 - 23 mg/dL    Creatinine 3.57 (H) 0.50 - 1.30 mg/dL    eGFR 19 (L) >60 mL/min/1.73m*2    Calcium 9.3 8.6 - 10.6 mg/dL    Phosphorus 5.3 (H) 2.5 - 4.9 mg/dL    Albumin 3.2 (L) 3.4 - 5.0 g/dL   CBC and Auto Differential   Result Value Ref Range    WBC 9.1 4.4 - 11.3 x10*3/uL    nRBC 0.0 0.0 - 0.0 /100 WBCs    RBC 3.20 (L) 4.50 - 5.90 x10*6/uL    Hemoglobin 8.5 (L) 13.5 - 17.5 g/dL    Hematocrit 26.0 (L) 41.0 - 52.0 %    MCV 81 80 - 100 fL    MCH 26.6 26.0 - 34.0 pg    MCHC 32.7 32.0 - 36.0 g/dL    RDW 16.0 (H) 11.5 - 14.5 %    Platelets 368 150 - 450 x10*3/uL    Immature Granulocytes %, Automated 5.1 (H) 0.0 - 0.9 %    Immature Granulocytes Absolute, Automated 0.47 0.00 - 0.70 x10*3/uL   Magnesium   Result Value Ref Range    Magnesium 1.71 1.60 - 2.40 mg/dL   Renal Function Panel   Result Value Ref Range    Glucose 86 74 - 99 mg/dL    Sodium 139 136 - 145 mmol/L    Potassium 4.1 3.5 - 5.3 mmol/L    Chloride 112 (H) 98 - 107 mmol/L    Bicarbonate 19 (L) 21 - 32 mmol/L    Anion Gap 12 10 - 20 mmol/L    Urea Nitrogen 99 (HH) 6 - 23 mg/dL    Creatinine 1.91 (H) 0.50 - 1.30 mg/dL    eGFR 40 (L) >60 mL/min/1.73m*2    Calcium 6.9 (L) 8.6 - 10.6 mg/dL    Phosphorus 3.4 2.5 - 4.9 mg/dL    Albumin 2.4 (L) 3.4 - 5.0 g/dL   Manual Differential   Result Value Ref Range    Neutrophils %, Manual 84.4 40.0 - 80.0 %    Lymphocytes %, Manual 7.8 13.0 - 44.0 %    Monocytes %, Manual 4.3 2.0 - 10.0 %    Eosinophils %, Manual 0.9 0.0 - 6.0 %    Basophils %, Manual 0.9 0.0 - 2.0 %    Myelocytes %, Manual 1.7 0.0 - 0.0 %    Seg Neutrophils Absolute, Manual 7.68 (H) 1.20 - 7.00 x10*3/uL    Lymphocytes Absolute, Manual  0.71 (L) 1.20 - 4.80 x10*3/uL    Monocytes Absolute, Manual 0.39 0.10 - 1.00 x10*3/uL    Eosinophils Absolute, Manual 0.08 0.00 - 0.70 x10*3/uL    Basophils Absolute, Manual 0.08 0.00 - 0.10 x10*3/uL    Myelocytes Absolute, Manual 0.15 0.00 - 0.00 x10*3/uL    Total Cells Counted 115     RBC Morphology See Below     Nogales Cells Many     Acanthocytes Few    POCT GLUCOSE   Result Value Ref Range    POCT Glucose 112 (H) 74 - 99 mg/dL          ASSESSMENT:  Cam Evans is a  59 y.o.  Year old , with PMHx of  HFrEF (EF 20% on 2021), iron deficiency anemia, HTN, and HLD who is admitted for further workup and eval of VERO, uremia, and worsening fatigue and MORIN, and found to have L rectal mass with CT imaging findings c/f hepatic and spinal mets.     Nephrology is consulted for eval of VERO and elevated BUN and eval for possible HD, and found to have hyperuricemia, hyperphosphatemia, and elevated LDH, c/f TLS     #Renal Impairment, VERO (resolving)   #Azotemia (improving)   - UOP seems to improving this morning   - Last Cr 1.2, BUN 15 in 04/2024  - Etiology of VERO likely multifactorial. May have prerenal component given poor PO intake PTA. Other intrarenal causes including Glomerular and Tubulointerstitial are possible.   - On admission Cr 7.9, , HCO3 12  -Had dark stools overnight though pt chronically on iron supplement so more likely to be from   - U/A: Trace Blood, WBC  - Does not appear to be on any medications that made induce VERO   - Clinical volume status: appears euvolemic on exam   -  Electrolytes: Na, K, Phos stable. Ca mildly low this AM   - CT abdomen/pelvis 5/5/25 without evidence of hydronephrosis or urinary obstruction      #C/f SpontaneousTLS   #Hyperuricemia (resolving)  #Elevated LDH (improving)  #Hyperphosphatemia (resolved)  - Hyperuricemia and hyperphosphatemia I/s/o CT imaging findings showing L-sided rectal mass and possible liver and spinal mets are c/f possible TLS and biochemically pt met  criteria for TLS   - LDH may be elevated I/s/o TLS vs possible  component of underlying hemolysis   -Uric Acid levels trending toward normal after administratin of rasuburicase      #HAGMA (resolved)   - Does not have prior hx of DM (A1c this admission 5.5)  - Beta hydrobutyrate elevated to 1.98  - Initially presented with AG 28.9 with delta gap 16, likely 2/2 renal insufficiency   - No prior hx of dx of DM,  and not on any SGLT-2i or anti-hyperglycemics prior to admission   - Was started on NaHCO3 gtt overnight for HAGMA, now dc'd       Recommendations:  -Agree with IVF resuscitation PRN though would caution given HFrEF   - Indication for dialysis:  no urgent indication for HD presently though if Uric Acid, Phos and K continue to be  persistently elevated or becomes symptomatically uremic despite adequate IVF resuscitation  - Agree with initiation of allopurinol per Oncology, appreciate recs. Would continue on discharge as this patient will be at risk for hyperuricemia and/or TLS should pt be initiated on chemotherapy   - Agree with holding home losartan and spironolactone I/s/o VERO   - Avoid nephrotoxins, contrast if possible  - Strict Is/Os  - Renal dosing for medications for latest eGFR, follow medication trough as appropriate  - Avoid hypotension/rapid fluctuations in BP's    At this time Nephrology will sign off and follow labs peripherally. Please do not hesitate to reach out with any questions or concerns.       Sis Sandy MD  Internal Medicine Resident  24 hour Renal Pager - [phone]    Assessment and Recommendations to be discussed with Attending Nephrologist, Dr. David .

## 2025-05-07 NOTE — SIGNIFICANT EVENT
Flex sig today with concern for rectal tumor. Biopsied.    Will await pathology.  Recommend aggressive bowel regimen.    Thank you for the consultation.  The consulting team will sign off now.  Please do not hesitate to contact us again on by paging the consultation team again between the weekday hours of 7 AM - 5 PM.  If there is an urgent concern during the weekend, after-hours, or holidays; then please page the [place] [phone]. Thank you.

## 2025-05-07 NOTE — PROGRESS NOTES
Internal Medicine Progress Note    Cam Evans is a 59 y.o. male on hospital day 2 of admission presenting with Acute renal failure, unspecified acute renal failure type.    Subjective   NAEON. Patient reports significant anxiety last night surrounding upcoming GI procedure and states that trazodone given yesterday evening was helpful. Reports good urine output and had non-bloody BM yesterday afternoon.       Objective     Physical Exam  Vitals reviewed.   Constitutional:       General: He is awake. He is not in acute distress.     Appearance: Normal appearance.   Eyes:      Extraocular Movements: Extraocular movements intact.   Cardiovascular:      Rate and Rhythm: Regular rhythm. Tachycardia present.      Heart sounds: Normal heart sounds. No murmur heard.  Pulmonary:      Effort: Pulmonary effort is normal. No respiratory distress.      Breath sounds: Normal breath sounds.   Abdominal:      General: Abdomen is flat. Bowel sounds are normal.      Palpations: Abdomen is soft.      Tenderness: There is no abdominal tenderness.   Musculoskeletal:      Right lower leg: No edema.      Left lower leg: No edema.   Skin:     General: Skin is warm and dry.   Neurological:      Mental Status: He is alert. Mental status is at baseline.      Comments: No asterixis present. A&Ox4   Psychiatric:         Behavior: Behavior is cooperative.         Last Recorded Vitals  Blood pressure 96/75, pulse (!) 112, temperature 36.5 °C (97.7 °F), temperature source Temporal, resp. rate (!) 22, height 1.829 m (6'), weight 99.8 kg (220 lb), SpO2 99%.  Intake/Output last 24h:    Intake/Output Summary (Last 24 hours) at 5/7/2025 0656  Last data filed at 5/6/2025 1653  Gross per 24 hour   Intake --   Output 700 ml   Net -700 ml       Relevant Results  Results from last 72 hours   Lab Units 05/07/25  0422 05/06/25  1646 05/06/25  1144 05/06/25  0452 05/06/25  0301 05/05/25  2212 05/05/25  1324   WBC AUTO x10*3/uL 9.1  --   --   --  10.2  --   11.9*   HEMOGLOBIN g/dL 8.5*  --   --   --  10.4*  --  12.1*   HEMATOCRIT % 26.0*  --   --   --  31.4*  --  34.9*   PLATELETS AUTO x10*3/uL 368  --   --   --  442  --  519*   SODIUM mmol/L 139 134* 134*   < > 131*   < > 133*   POTASSIUM mmol/L 4.1 3.9 4.2   < > 8.5*   < > 5.2   CHLORIDE mmol/L 112* 100 98   < > 97*   < > 93*   CO2 mmol/L 19* 21 20*   < > 13*   < > 12*   BUN mg/dL 99* 157* 165*   < > 171*   < > 203*   CREATININE mg/dL 1.91* 3.57* 4.44*   < > 5.75*   < > 7.93*   GLUCOSE mg/dL 86 185* 133*   < > 136*   < > 92   CALCIUM mg/dL 6.9* 9.3 9.6   < > 8.3*   < > 10.1   MAGNESIUM mg/dL 1.71 2.31 2.52*  --  2.84*   < > 3.10*   PHOSPHORUS mg/dL 3.4 5.3* 6.5*   < > 9.1*   < >  --    ALBUMIN g/dL 2.4* 3.2* 3.3*   < > 3.3*   < > 3.8   ALK PHOS U/L  --   --   --   --   --   --  175*   ALT U/L  --   --   --   --   --   --  6*   AST U/L  --   --   --   --   --   --  6*   BILIRUBIN TOTAL mg/dL  --   --   --   --   --   --  0.5    < > = values in this interval not displayed.     Scheduled medications  Scheduled Medications[1]  Continuous medications  Continuous Medications[2]  PRN medications  PRN Medications[3]         Assessment/Plan   Cam Evans is a 59 y.o. male with PMHx of CHF (EF 20% on 2021), iron deficiency anemia, HTN, and dyslipidemia, presenting with generalized weakness and fatigue and loss of appetite for the past week. Had history of bowel habit change with constipation for the past month. Hypotensive at ED S/P IV LR 2 L. Labs showing acute renal failure with , Cr 7.93, acidosis with HCO3 of 12. CT CAP wo IV contrast showing 3x3 cm soft tissue mass at rectum with multiple ill-defined hypodense mass at liver and diffuse sclerotic vertebral lesion at T8 and left 5th rib, suspected metastatic disease.     Updates 5/7:  - CEA WNL, PSA 5.47,  39.67  - Kidney function continues to improve with fluid resuscitation; no indication for HD at this time  - Flex sig with GI 5/7 for biopsy  - Hematology  reached out today stating that they would still like biopsy of his liver mets   - SPEP/UPEP pending to rule out multiple myeloma   - TTE 5/6 with EF 25-30%, no valvular abnormalities     #Acute renal failure  #Metabolic acidosis 2/2 azotemia vs starvation ketosis  #Elevated uric acid   :: Prerenal azotemia VS ATN: still produce some urine, no asterixis  :: BUN/Cr 203/7.93, no baseline Cr  :: Hypotensive at initial presentation S/P IV LR 2 L  :: UA - pH 5.0, SpGr 1.016, protein trace, WBC 6-10, RBC 1-2  :: VBG pH 7.30, PCO2 27, lactate 1.2, B-hydroxybutyrate level 1.98  :: CT: no hydroureteronephrosis/other obstruction visualized   Plan   - Renal consulted, appreciated recs              - S/P 1x dose of rasburicase 5/6   - Continue aggressive fluid repletion - LR 100ml/hr    - If hypotensive, consider bolus IV of  mL   - will follow RFP   - avoid nephrotoxic agents/medications, keep euvolemia and normotensive   - NPO; on hypoglycemic protocol     #Rectal mass, 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  :: history of bowel habit change, constipation past month, along with loss of appetite  :: CT CAP: 3x3 cm soft tissue mass at rectum with multiple ill-defined hypodense mass at liver and diffuse sclerotic vertebral lesion at T8 and left 5th rib, suspected metastatic disease  :: Per chart review, history of Cologuard in 2024, but no colonoscopy on record   Plan   - GI consulted for potential rectal mass biopsy and scope, to do flex sig 5/7   - Will need liver biopsy at some point per hematology      #History of HFrEF  #HTN  :: Hypo- to euvolemic upon initial assessment, BP 90-100s/60s at ED  :: TTE 5/6 with EF 25-30%, no valvular abnormalities  Plan   - Hold off spironolactone, losartan in setting of VERO and hypotension   - Will repeat TTE to better estimate LVEF   - will closely monitor fluid given   - closely follow-up I/Os     #Dyslipidemia  - Continue atorvastatin 40 mg daily     #Iron  deficiency anemia  - Continue ferrous sulfate tablet 325 daily     F: IV bicarb drip 75 mL/hr  E: replete prn  N: NPO  A: PIVx1     DVT ppx: Heparin 5000 U subcutaneous q 8 hr  GI ppx: None  Bowel regimen: miralax, pericolace     Code: Full  NOK: Mali Galan (Mother) [phone] (Mobile)          Patient seen and staffed with attending physician. Recommendations not final until attested.     Rod Alford MD  PGY-1, Internal Medicine           [1] allopurinol, 300 mg, oral, Daily  aspirin, 81 mg, oral, Daily  atorvastatin, 40 mg, oral, Nightly  ferrous sulfate, 65 mg of iron, oral, Daily with breakfast  [Held by provider] heparin (porcine), 5,000 Units, subcutaneous, q8h ANTONIO  insulin lispro, 0-5 Units, subcutaneous, TID AC  polyethylene glycol, 17 g, oral, Daily  sennosides-docusate sodium, 2 tablet, oral, BID  [2] sodium chloride 0.9%, 100 mL/hr, Last Rate: 100 mL/hr (05/07/25 2118)  [3] PRN medications: dextrose, dextrose, glucagon, glucagon

## 2025-05-08 ENCOUNTER — APPOINTMENT (OUTPATIENT)
Dept: RADIOLOGY | Facility: HOSPITAL | Age: 60
DRG: 683 | End: 2025-05-08
Payer: MEDICARE

## 2025-05-08 LAB
ALBUMIN MFR UR ELPH: 20.6 %
ALBUMIN SERPL BCP-MCNC: 3.1 G/DL (ref 3.4–5)
ALPHA1 GLOB MFR UR ELPH: 16.3 %
ALPHA2 GLOB MFR UR ELPH: 17.6 %
ANION GAP SERPL CALC-SCNC: 15 MMOL/L (ref 10–20)
B-GLOBULIN MFR UR ELPH: 15.4 %
BASOPHILS # BLD AUTO: 0.03 X10*3/UL (ref 0–0.1)
BASOPHILS NFR BLD AUTO: 0.3 %
BUN SERPL-MCNC: 67 MG/DL (ref 6–23)
CALCIUM SERPL-MCNC: 8.8 MG/DL (ref 8.6–10.6)
CHLORIDE SERPL-SCNC: 107 MMOL/L (ref 98–107)
CO2 SERPL-SCNC: 24 MMOL/L (ref 21–32)
CREAT SERPL-MCNC: 1.32 MG/DL (ref 0.5–1.3)
EGFRCR SERPLBLD CKD-EPI 2021: 62 ML/MIN/1.73M*2
EOSINOPHIL # BLD AUTO: 0.08 X10*3/UL (ref 0–0.7)
EOSINOPHIL NFR BLD AUTO: 0.7 %
ERYTHROCYTE [DISTWIDTH] IN BLOOD BY AUTOMATED COUNT: 16.1 % (ref 11.5–14.5)
GAMMA GLOB MFR UR ELPH: 30.1 %
GLUCOSE BLD MANUAL STRIP-MCNC: 103 MG/DL (ref 74–99)
GLUCOSE SERPL-MCNC: 104 MG/DL (ref 74–99)
HCT VFR BLD AUTO: 31.2 % (ref 41–52)
HGB BLD-MCNC: 10 G/DL (ref 13.5–17.5)
IMM GRANULOCYTES # BLD AUTO: 0.47 X10*3/UL (ref 0–0.7)
IMM GRANULOCYTES NFR BLD AUTO: 4.3 % (ref 0–0.9)
IMMUNOFIXATION COMMENT: NORMAL
LYMPHOCYTES # BLD AUTO: 1.13 X10*3/UL (ref 1.2–4.8)
LYMPHOCYTES NFR BLD AUTO: 10.2 %
MAGNESIUM SERPL-MCNC: 1.57 MG/DL (ref 1.6–2.4)
MCH RBC QN AUTO: 26.7 PG (ref 26–34)
MCHC RBC AUTO-ENTMCNC: 32.1 G/DL (ref 32–36)
MCV RBC AUTO: 83 FL (ref 80–100)
MONOCYTES # BLD AUTO: 0.48 X10*3/UL (ref 0.1–1)
MONOCYTES NFR BLD AUTO: 4.4 %
NEUTROPHILS # BLD AUTO: 8.84 X10*3/UL (ref 1.2–7.7)
NEUTROPHILS NFR BLD AUTO: 80.1 %
NRBC BLD-RTO: 0 /100 WBCS (ref 0–0)
PATH REVIEW - URINE IMMUNOFIXATION: NORMAL
PATH REVIEW-URINE PROTEIN ELECTROPHORESIS: NORMAL
PHOSPHATE SERPL-MCNC: 2.5 MG/DL (ref 2.5–4.9)
PLATELET # BLD AUTO: 449 X10*3/UL (ref 150–450)
POTASSIUM SERPL-SCNC: 4.6 MMOL/L (ref 3.5–5.3)
RBC # BLD AUTO: 3.74 X10*6/UL (ref 4.5–5.9)
SODIUM SERPL-SCNC: 141 MMOL/L (ref 136–145)
URATE SERPL-MCNC: <1.5 MG/DL (ref 4–7.5)
URINE ELECTROPHORESIS COMMENT: NORMAL
WBC # BLD AUTO: 11 X10*3/UL (ref 4.4–11.3)

## 2025-05-08 PROCEDURE — 1200000002 HC GENERAL ROOM WITH TELEMETRY DAILY

## 2025-05-08 PROCEDURE — 84550 ASSAY OF BLOOD/URIC ACID: CPT

## 2025-05-08 PROCEDURE — 2500000001 HC RX 250 WO HCPCS SELF ADMINISTERED DRUGS (ALT 637 FOR MEDICARE OP)

## 2025-05-08 PROCEDURE — 82947 ASSAY GLUCOSE BLOOD QUANT: CPT

## 2025-05-08 PROCEDURE — 2500000004 HC RX 250 GENERAL PHARMACY W/ HCPCS (ALT 636 FOR OP/ED): Mod: JZ

## 2025-05-08 PROCEDURE — 83735 ASSAY OF MAGNESIUM: CPT

## 2025-05-08 PROCEDURE — 99233 SBSQ HOSP IP/OBS HIGH 50: CPT

## 2025-05-08 PROCEDURE — 2500000005 HC RX 250 GENERAL PHARMACY W/O HCPCS

## 2025-05-08 PROCEDURE — 85025 COMPLETE CBC W/AUTO DIFF WBC: CPT

## 2025-05-08 PROCEDURE — A9575 INJ GADOTERATE MEGLUMI 0.1ML: HCPCS | Mod: JZ | Performed by: EMERGENCY MEDICINE

## 2025-05-08 PROCEDURE — 36415 COLL VENOUS BLD VENIPUNCTURE: CPT

## 2025-05-08 PROCEDURE — 74183 MRI ABD W/O CNTR FLWD CNTR: CPT

## 2025-05-08 PROCEDURE — 2550000001 HC RX 255 CONTRASTS: Mod: JZ | Performed by: EMERGENCY MEDICINE

## 2025-05-08 PROCEDURE — 2500000004 HC RX 250 GENERAL PHARMACY W/ HCPCS (ALT 636 FOR OP/ED)

## 2025-05-08 PROCEDURE — 80069 RENAL FUNCTION PANEL: CPT

## 2025-05-08 RX ORDER — TRAZODONE HYDROCHLORIDE 50 MG/1
50 TABLET ORAL NIGHTLY
Status: DISCONTINUED | OUTPATIENT
Start: 2025-05-08 | End: 2025-05-10 | Stop reason: HOSPADM

## 2025-05-08 RX ORDER — LIDOCAINE 560 MG/1
1 PATCH PERCUTANEOUS; TOPICAL; TRANSDERMAL DAILY
Status: DISCONTINUED | OUTPATIENT
Start: 2025-05-08 | End: 2025-05-10 | Stop reason: HOSPADM

## 2025-05-08 RX ORDER — MAGNESIUM SULFATE HEPTAHYDRATE 40 MG/ML
4 INJECTION, SOLUTION INTRAVENOUS ONCE
Status: COMPLETED | OUTPATIENT
Start: 2025-05-08 | End: 2025-05-08

## 2025-05-08 RX ORDER — ACETAMINOPHEN 325 MG/1
975 TABLET ORAL ONCE
Status: COMPLETED | OUTPATIENT
Start: 2025-05-08 | End: 2025-05-08

## 2025-05-08 RX ORDER — GADOTERATE MEGLUMINE 376.9 MG/ML
20 INJECTION INTRAVENOUS
Status: COMPLETED | OUTPATIENT
Start: 2025-05-08 | End: 2025-05-08

## 2025-05-08 RX ORDER — ACETAMINOPHEN 325 MG/1
975 TABLET ORAL EVERY 8 HOURS PRN
Status: DISCONTINUED | OUTPATIENT
Start: 2025-05-08 | End: 2025-05-10 | Stop reason: HOSPADM

## 2025-05-08 RX ADMIN — ATORVASTATIN CALCIUM 40 MG: 40 TABLET, FILM COATED ORAL at 20:58

## 2025-05-08 RX ADMIN — ACETAMINOPHEN 975 MG: 325 TABLET, FILM COATED ORAL at 16:46

## 2025-05-08 RX ADMIN — ALLOPURINOL 300 MG: 300 TABLET ORAL at 09:11

## 2025-05-08 RX ADMIN — TRAZODONE HYDROCHLORIDE 50 MG: 50 TABLET ORAL at 20:58

## 2025-05-08 RX ADMIN — SENNOSIDES AND DOCUSATE SODIUM 2 TABLET: 50; 8.6 TABLET ORAL at 09:10

## 2025-05-08 RX ADMIN — GADOTERATE MEGLUMINE 20 ML: 376.9 INJECTION INTRAVENOUS at 20:12

## 2025-05-08 RX ADMIN — PSYLLIUM HUSK 1 PACKET: 3.4 POWDER ORAL at 10:59

## 2025-05-08 RX ADMIN — HEPARIN SODIUM 5000 UNITS: 5000 INJECTION, SOLUTION INTRAVENOUS; SUBCUTANEOUS at 21:00

## 2025-05-08 RX ADMIN — LIDOCAINE 1 PATCH: 4 PATCH TOPICAL at 20:56

## 2025-05-08 RX ADMIN — POLYETHYLENE GLYCOL 3350 17 G: 17 POWDER, FOR SOLUTION ORAL at 09:10

## 2025-05-08 RX ADMIN — MAGNESIUM SULFATE HEPTAHYDRATE 4 G: 40 INJECTION, SOLUTION INTRAVENOUS at 10:55

## 2025-05-08 RX ADMIN — FERROUS SULFATE TAB 325 MG (65 MG ELEMENTAL FE) 325 MG: 325 (65 FE) TAB at 09:15

## 2025-05-08 RX ADMIN — SODIUM CHLORIDE, SODIUM LACTATE, POTASSIUM CHLORIDE, AND CALCIUM CHLORIDE 500 ML: .6; .31; .03; .02 INJECTION, SOLUTION INTRAVENOUS at 04:56

## 2025-05-08 RX ADMIN — ACETAMINOPHEN 975 MG: 325 TABLET, FILM COATED ORAL at 04:51

## 2025-05-08 RX ADMIN — ASPIRIN 81 MG CHEWABLE TABLET 81 MG: 81 TABLET CHEWABLE at 09:11

## 2025-05-08 ASSESSMENT — COGNITIVE AND FUNCTIONAL STATUS - GENERAL
DAILY ACTIVITIY SCORE: 24
DAILY ACTIVITIY SCORE: 24
CLIMB 3 TO 5 STEPS WITH RAILING: A LITTLE
CLIMB 3 TO 5 STEPS WITH RAILING: A LITTLE
MOBILITY SCORE: 23
MOBILITY SCORE: 23

## 2025-05-08 ASSESSMENT — PAIN SCALES - GENERAL
PAINLEVEL_OUTOF10: 8
PAINLEVEL_OUTOF10: 5 - MODERATE PAIN
PAINLEVEL_OUTOF10: 5 - MODERATE PAIN
PAINLEVEL_OUTOF10: 8

## 2025-05-08 ASSESSMENT — PAIN - FUNCTIONAL ASSESSMENT
PAIN_FUNCTIONAL_ASSESSMENT: 0-10

## 2025-05-08 ASSESSMENT — PAIN DESCRIPTION - DESCRIPTORS
DESCRIPTORS: ACHING
DESCRIPTORS: NUMBNESS

## 2025-05-08 ASSESSMENT — PAIN DESCRIPTION - LOCATION: LOCATION: BACK

## 2025-05-08 ASSESSMENT — PAIN DESCRIPTION - ORIENTATION: ORIENTATION: LOWER

## 2025-05-08 NOTE — PROGRESS NOTES
05/08/25 1103   Discharge Planning   Who is requesting discharge planning? Provider   Home or Post Acute Services Post acute facilities (Rehab/SNF/etc)   Type of Post Acute Facility Services Skilled nursing   Expected Discharge Disposition SNF   Does the patient need discharge transport arranged? Yes   RoundTrip coordination needed? Yes     Transitional Care Coordination Progress Note:  Patient discussed during interdisciplinary rounds.  Team members present: INES RANGEL  Plan per Medical/Surgical team: Pt presenting with an VERO and was found to have a rectal mass on CT CAP, rectal biopsy pending. Oncology team was consulted, MRI A/P ordered, plan for scope with GI team, and aggressive bowel regimen ordered.  Payer: [employer]  Status: Inpatient  Discharge disposition: PT/OT evaluations are pending. Anticipate pt will discharge to AR/SNF vs home with home PT/OT.  Potential Barriers: None  ADOD: 5/11  TCC supervisor attempted to meet with pt multiple times this afternoon to complete discharge planning assessment but pt was in the bathroom. This writer attempted to meet with pt x3 this afternoon but he was sound asleep in bed so assessment deferred to another time. Care coordinator will continue to follow for discharge planning needs.     Jaxson Guevara RN  Transitional Care Coordinator (TCC)  [phone] or [phone]

## 2025-05-08 NOTE — PROGRESS NOTES
Physical Therapy                 Therapy Communication Note    Patient Name: Cam Evans  MRN: [id number]  Department: [facility]  Room: [facility]/5071  Today's Date: 5/8/2025     Discipline: Physical Therapy    PT Missed Visit: Yes     Missed Visit Reason: Missed Visit Reason: Patient refused (Pt supine in bed upon arrival and refused 2/2 back pain 10/10. RN notified)    Missed Time: 15:38 PM

## 2025-05-08 NOTE — CONSULTS
Nutrition Initial Assessment:   Nutrition Assessment    Reason for Assessment: Admission nursing screening - MST 3    Patient is a 59 y.o. male presenting with  an VERO and was found to have a rectal mass on CT CAP, rectal biopsy pending. Oncology team was consulted, MRI A/P ordered, plan for scope with GI team, and aggressive bowel regimen ordered.     PMHx of CHF (EF 20% on 2021), iron deficiency anemia, HTN, and dyslipidemia     Nutrition History:  Food and Nutrient History: Pt reports his appetite was poor x 1 week PTA. Pt states he was not able to eat anything during that time frame. Pt reports his baseline as 50-75% of 2-3 meals/day. Pt endorsed constipation PTA, now with diarrhea d/t bowel regimen. Pt amenable to try Ensure Plus vanilla  Vitamin/Herbal Supplement Use: Vit D per pt  Food Allergy:  (None per pt)       Anthropometrics:  Height: 182.9 cm (6')   Weight: 99.8 kg (220 lb)   BMI (Calculated): 29.83  IBW/kg (Dietitian Calculated): 81 kg  Percent of IBW: 123 %       Weight History:   Wt Readings from Last 20 Encounters:   05/05/25 99.8 kg (220 lb)   11/05/24 98.6 kg (217 lb 6.4 oz)   05/14/24 97.5 kg (215 lb)       Weight Change %:  Weight History / % Weight Change: Pt reports no recent weight changes. Per chart, pt with a a relatively stable weight within the past year. Pt's current weight with an unknown measurement method.    Nutrition Focused Physical Exam Findings:    Subcutaneous Fat Loss:   Orbital Fat Pads: Mild-Moderate (slight dark circles and slight hollowing)  Buccal Fat Pads: Mild-Moderate (flat cheeks, minimal bounce)  Triceps: Mild-Moderate (less than ample fat tissue)  Muscle Wasting:  Temporalis: Mild-Moderate (slight depression)  Pectoralis (Clavicular Region): Mild-Moderate (some protrusion of clavicle)  Deltoid/Trapezius: Mild-Moderate (slight protrusion of acromion process)  Interosseous: Mild-Moderate (slightly depressed area between thumb and forefinger)  Quadriceps: Mild-Moderate  "(mild depression on inner and outer thigh)  Gastrocnemius: Well nourished (well developed bulbous muscle)  Edema:  Edema: none  Physical Findings:  Skin: Negative  Digestive System Findings: Constipation, Diarrhea  Mouth Findings:  (None per pt)    Nutrition Significant Labs:  CBC Trend:   Results from last 7 days   Lab Units 05/08/25  0448 05/07/25  0422 05/06/25  0301 05/05/25  1324   WBC AUTO x10*3/uL 11.0 9.1 10.2 11.9*   RBC AUTO x10*6/uL 3.74* 3.20* 3.86* 4.46*   HEMOGLOBIN g/dL 10.0* 8.5* 10.4* 12.1*   HEMATOCRIT % 31.2* 26.0* 31.4* 34.9*   MCV fL 83 81 81 78*   PLATELETS AUTO x10*3/uL 449 368 442 519*    , BMP Trend:   Results from last 7 days   Lab Units 05/08/25 0448 05/07/25  1632 05/07/25  0422 05/06/25  1646   GLUCOSE mg/dL 104* 96 86 185*   CALCIUM mg/dL 8.8 8.9 6.9* 9.3   SODIUM mmol/L 141 139 139 134*   POTASSIUM mmol/L 4.6 4.4 4.1 3.9   CO2 mmol/L 24 22 19* 21   CHLORIDE mmol/L 107 107 112* 100   BUN mg/dL 67* 92* 99* 157*   CREATININE mg/dL 1.32* 1.65* 1.91* 3.57*    , BG POCT trend:   Results from last 7 days   Lab Units 05/08/25  0747 05/07/25  1648 05/07/25  1428 05/07/25  1148 05/07/25  0610   POCT GLUCOSE mg/dL 103* 103* 96 105* 112*    , Liver Function Trend:   Results from last 7 days   Lab Units 05/05/25  1324   ALK PHOS U/L 175*   AST U/L 6*   ALT U/L 6*   BILIRUBIN TOTAL mg/dL 0.5    , Renal Lab Trend:   Results from last 7 days   Lab Units 05/08/25  0448 05/07/25  1632 05/07/25  0422 05/06/25  1646   POTASSIUM mmol/L 4.6 4.4 4.1 3.9   PHOSPHORUS mg/dL 2.5 3.2 3.4 5.3*   SODIUM mmol/L 141 139 139 134*   MAGNESIUM mg/dL 1.57* 1.87 1.71 2.31   EGFR mL/min/1.73m*2 62 48* 40* 19*   BUN mg/dL 67* 92* 99* 157*   CREATININE mg/dL 1.32* 1.65* 1.91* 3.57*    , Vit D: No results found for: \"VITD25\" , Iron Panel:   Lab Results   Component Value Date    IRON 48 05/06/2025    TIBC 151 (L) 05/06/2025    FERRITIN 2,951 (H) 05/06/2025        Nutrition Specific Medications:  Scheduled " medications  allopurinol, 300 mg, oral, Daily  aspirin, 81 mg, oral, Daily  atorvastatin, 40 mg, oral, Nightly  ferrous sulfate, 65 mg of iron, oral, Daily with breakfast  [Held by provider] heparin (porcine), 5,000 Units, subcutaneous, q8h ANTONIO  polyethylene glycol, 17 g, oral, Daily  psyllium, 1 packet, oral, TID  sennosides-docusate sodium, 2 tablet, oral, BID  traZODone, 50 mg, oral, Nightly    Continuous medications  Continuous Medications[1]  PRN medications  PRN medications: acetaminophen      I/O:   Last BM Date: 05/08/25; Stool Appearance: Liquid (05/07/25 1032)    Dietary Orders (From admission, onward)       Start     Ordered    05/07/25 2223  May Participate in Room Service  ( ROOM SERVICE MAY PARTICIPATE)  Once        Question:  .  Answer:  Yes    05/07/25 2222 05/07/25 1612  Adult diet Cardiac; 70 gm fat; 2 - 3 grams Sodium  Diet effective now        Question Answer Comment   Diet type Cardiac    Fat restriction: 70 gm fat    Sodium restriction: 2 - 3 grams Sodium        05/07/25 1611                     Estimated Needs:   Total Energy Estimated Needs in 24 hours (kCal):  (9863-1577 kcal)  Method for Estimating Needs: 28-30 kcal/kg IBW  Total Protein Estimated Needs in 24 Hours (g): 95 g  Method for Estimating 24 Hour Protein Needs: 1.2 g/kg IBW  Total Fluid Estimated Needs in 24 Hours (mL):  (1 ml/kcal or per med team)           Nutrition Diagnosis   Malnutrition Diagnosis  Patient has Malnutrition Diagnosis: Yes  Diagnosis Status: New  Malnutrition Diagnosis: Moderate malnutrition related to chronic disease or condition  As Evidenced by: Suspected intake of <=75% EER for >/= 1 month, mild-moderate fat loss and muscle wasting            Nutrition Interventions/Recommendations   Nutrition prescription for oral nutrition    Nutrition Recommendations:    Recommend liberalizing diet to promote intake.   Please obtain updated weight measurement.   Consider holding bowel regimen iso diarrhea.   Obtain  vit D levels iso malnutrition    Nutrition Interventions/Goals:   Ordered Ensure Plus (350 kcal, 13 g pro) vanilla BID         Nutrition Monitoring and Evaluation   Food/Nutrient Related History Monitoring  Monitoring and Evaluation Plan: Estimated Energy Intake  Estimated Energy Intake: Energy intake 50 -75% of estimated energy needs    Anthropometric Measurements  Monitoring and Evaluation Plan: Body weight  Body Weight: Body weight - Maintain stable weight    Biochemical Data, Medical Tests and Procedures  Monitoring and Evaluation Plan: Electrolyte/renal panel, Glucose/endocrine profile, Vitamin profile  Criteria: WNL         Goal Status: New goal(s) identified    Time Spent (min): 60 minutes            [1]

## 2025-05-08 NOTE — PROGRESS NOTES
Occupational Therapy                 Therapy Communication Note    Patient Name: Cam Evans  MRN: [id number]  Department: [facility]  Room: 5071/5071-  Today's Date: 5/8/2025     Discipline: Occupational Therapy    OT Missed Visit: Yes     Missed Visit Reason: Missed Visit Reason: Patient refused (2/2 REPORTS OF 10/10 BACK PAIN)    Missed Time: Attempt    Comment:

## 2025-05-08 NOTE — PROGRESS NOTES
Internal Medicine Progress Note    Cam Evans is a 59 y.o. male on hospital day 3 of admission presenting with Acute renal failure, unspecified acute renal failure type.    Subjective   NAEON. Patient reports he is still experiencing some anxiety over the course of the night but said that he was able to sleep better. He reports that he has been urinating more and did not have a BM since yesterday. No new complaints.        Objective     Physical Exam  Vitals reviewed.   Constitutional:       General: He is awake. He is not in acute distress.     Appearance: Normal appearance.   Eyes:      Extraocular Movements: Extraocular movements intact.   Cardiovascular:      Rate and Rhythm: Regular rhythm. Normal S1 S2 Tachycardia present. No elevated JVP     Heart sounds: Normal heart sounds. No murmur heard.  Pulmonary:      Effort: Pulmonary effort is normal. No respiratory distress.      Breath sounds: Normal breath sounds.   Abdominal:      General: Abdomen is flat. Bowel sounds are normal.      Palpations: Abdomen is soft.      Tenderness: There is no abdominal tenderness.   Musculoskeletal:      Right lower leg: No edema.      Left lower leg: No edema.   Skin:     General: Skin is warm and dry.   Neurological:      Mental Status: He is alert. Mental status is at baseline.      Comments: No asterixis present. A&Ox4   Psychiatric:         Behavior: Behavior is cooperative.       Last Recorded Vitals  Blood pressure 104/70, pulse 107, temperature 36.6 °C (97.9 °F), temperature source Temporal, resp. rate 18, height 1.829 m (6'), weight 99.8 kg (220 lb), SpO2 99%.  Intake/Output last 24h:    Intake/Output Summary (Last 24 hours) at 5/8/2025 1257  Last data filed at 5/8/2025 0800  Gross per 24 hour   Intake 1270 ml   Output 600 ml   Net 670 ml       Relevant Results  Results from last 72 hours   Lab Units 05/08/25  0448 05/07/25  1632 05/07/25  0422 05/06/25  0452 05/06/25  0301 05/05/25  2212 05/05/25  1324   WBC  AUTO x10*3/uL 11.0  --  9.1  --  10.2  --  11.9*   HEMOGLOBIN g/dL 10.0*  --  8.5*  --  10.4*  --  12.1*   HEMATOCRIT % 31.2*  --  26.0*  --  31.4*  --  34.9*   PLATELETS AUTO x10*3/uL 449  --  368  --  442  --  519*   SODIUM mmol/L 141 139 139   < > 131*   < > 133*   POTASSIUM mmol/L 4.6 4.4 4.1   < > 8.5*   < > 5.2   CHLORIDE mmol/L 107 107 112*   < > 97*   < > 93*   CO2 mmol/L 24 22 19*   < > 13*   < > 12*   BUN mg/dL 67* 92* 99*   < > 171*   < > 203*   CREATININE mg/dL 1.32* 1.65* 1.91*   < > 5.75*   < > 7.93*   GLUCOSE mg/dL 104* 96 86   < > 136*   < > 92   CALCIUM mg/dL 8.8 8.9 6.9*   < > 8.3*   < > 10.1   MAGNESIUM mg/dL 1.57* 1.87 1.71   < > 2.84*   < > 3.10*   PHOSPHORUS mg/dL 2.5 3.2 3.4   < > 9.1*   < >  --    ALBUMIN g/dL 3.1* 3.3* 2.4*   < > 3.3*   < > 3.8   ALK PHOS U/L  --   --   --   --   --   --  175*   ALT U/L  --   --   --   --   --   --  6*   AST U/L  --   --   --   --   --   --  6*   BILIRUBIN TOTAL mg/dL  --   --   --   --   --   --  0.5    < > = values in this interval not displayed.     Scheduled medications  Scheduled Medications[1]  Continuous medications  Continuous Medications[2]  PRN medications  PRN Medications[3]         Assessment/Plan   Cam Evans is a 59 y.o. male with PMHx of CHF (EF 20% on 2021), iron deficiency anemia, HTN, and dyslipidemia, presenting with generalized weakness and fatigue and loss of appetite for the past week. Had history of bowel habit change with constipation for the past month. Hypotensive at ED S/P IV LR 2 L. Labs showing acute renal failure with , Cr 7.93, acidosis with HCO3 of 12. CT CAP wo IV contrast showing 3x3 cm soft tissue mass at rectum with multiple ill-defined hypodense mass at liver and diffuse sclerotic vertebral lesion at T8 and left 5th rib, suspected metastatic disease.     Updates 05/08/25:  - Continue aggressive bowel regimen   - SPEP/UPEP pending to rule out multiple myeloma  - Rectal biopsy pending  - Liver biopsy deferred by  oncology from suspicion of neuroendocrine tumor  - Marked improvement of kidney function and uric acid (to <1.5) with hydration; IVF discontinued  - MR abdomen/pelvis to characterize liver and bone lesions     #Acute renal failure  #Metabolic acidosis 2/2 azotemia vs starvation ketosis improved   #Elevated uric acid improved   :: Prerenal azotemia VS ATN: still produce some urine, no asterixis  :: BUN/Cr 203/7.93, no baseline Cr  :: Hypotensive at initial presentation S/P IV LR 2 L  :: CT: no hydroureteronephrosis/other obstruction visualized   Plan   - Renal consulted, appreciated recs              - S/P 1x dose of rasburicase 5/6   - Fluid repletion discontinued but will continue to monitor    - avoid nephrotoxic agents/medications, keep euvolemia and normotensive    #Rectal mass, 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  :: history of bowel habit change, constipation past month, along with loss of appetite  :: CT CAP: 3x3 cm soft tissue mass at rectum with multiple ill-defined hypodense mass at liver and diffuse sclerotic vertebral lesion at T8 and left 5th rib, suspected metastatic disease  :: Per chart review, history of Cologuard in 2024, but no colonoscopy on record   :: CEA WNL, PSA 5.47,  39.67   Plan  - Flex sig performed on 5/8 with malignant-appearing rectal mass, awaiting results from biopsy  - Liver biopsy deferred by oncology d/t suspicion of neuroendocrine tumor    - Will likely need liver biopsy at some point per hematology   - MR abdomen/pelvis pending to characterize liver and bone lesions     #History of HFrEF  #HTN  :: Hypo- to euvolemic upon initial assessment, BP 90-100s/60s at ED  :: TTE 5/6 with EF 25-30%, no valvular abnormalities  Plan   - Hold off spironolactone, losartan in setting of hypotension   - Strict I/Os   - Will likely require GDMT, however soft BPs relatively prohibitive at this time; will introduce as tolerated    #Dyslipidemia  - Continue atorvastatin  40 mg daily     #Iron deficiency anemia  - Continue ferrous sulfate tablet 325 daily     F: PRN  E: replete prn  N: Cardiac; Na-restricted  A: PIVx1     DVT ppx: Heparin 5000 U subcutaneous q 8 hr  GI ppx: None  Bowel regimen: stool softener     Code: Full  NOK: Mali Galan (Mother) [phone] (Mobile)      Patient seen and staffed with attending physician. Recommendations not final until attested.     Rod Alford MD  MS3, [facility]          [1] allopurinol, 300 mg, oral, Daily  aspirin, 81 mg, oral, Daily  atorvastatin, 40 mg, oral, Nightly  ferrous sulfate, 65 mg of iron, oral, Daily with breakfast  [Held by provider] heparin (porcine), 5,000 Units, subcutaneous, q8h ANTONIO  magnesium sulfate, 4 g, intravenous, Once  polyethylene glycol, 17 g, oral, Daily  psyllium, 1 packet, oral, TID  sennosides-docusate sodium, 2 tablet, oral, BID     [2]    [3]

## 2025-05-08 NOTE — PROGRESS NOTES
Internal Medicine Progress Note    Cam Evans is a 59 y.o. male on hospital day 3 of admission presenting with Acute renal failure, unspecified acute renal failure type.    Subjective   NAEON. Patient reports he is still experiencing some anxiety over the course of the night but said that he was able to sleep better. He reports that he has been urinating more and did not have a BM since yesterday. No new complaints.        Objective     Physical Exam  Vitals reviewed.   Constitutional:       General: He is awake. He is not in acute distress.     Appearance: Normal appearance.   Eyes:      Extraocular Movements: Extraocular movements intact.   Cardiovascular:      Rate and Rhythm: Regular rhythm. Normal S1 S2 Tachycardia present. No elevated JVP     Heart sounds: Normal heart sounds. No murmur heard.  Pulmonary:      Effort: Pulmonary effort is normal. No respiratory distress.      Breath sounds: Normal breath sounds.   Abdominal:      General: Abdomen is flat. Bowel sounds are normal.      Palpations: Abdomen is soft.      Tenderness: There is no abdominal tenderness.   Musculoskeletal:      Right lower leg: No edema.      Left lower leg: No edema.   Skin:     General: Skin is warm and dry.   Neurological:      Mental Status: He is alert. Mental status is at baseline.      Comments: No asterixis present. A&Ox4   Psychiatric:         Behavior: Behavior is cooperative.       Last Recorded Vitals  Blood pressure 103/70, pulse 100, temperature 36.6 °C (97.9 °F), temperature source (P) Temporal, resp. rate (P) 18, height 1.829 m (6'), weight 99.8 kg (220 lb), SpO2 98%.  Intake/Output last 24h:    Intake/Output Summary (Last 24 hours) at 5/8/2025 0754  Last data filed at 5/8/2025 0352  Gross per 24 hour   Intake 1240 ml   Output 3200 ml   Net -1960 ml       Relevant Results  Results from last 72 hours   Lab Units 05/08/25  0448 05/07/25  1632 05/07/25  0422 05/06/25  0452 05/06/25  0301 05/05/25  2212  05/05/25  1324   WBC AUTO x10*3/uL 11.0  --  9.1  --  10.2  --  11.9*   HEMOGLOBIN g/dL 10.0*  --  8.5*  --  10.4*  --  12.1*   HEMATOCRIT % 31.2*  --  26.0*  --  31.4*  --  34.9*   PLATELETS AUTO x10*3/uL 449  --  368  --  442  --  519*   SODIUM mmol/L 141 139 139   < > 131*   < > 133*   POTASSIUM mmol/L 4.6 4.4 4.1   < > 8.5*   < > 5.2   CHLORIDE mmol/L 107 107 112*   < > 97*   < > 93*   CO2 mmol/L 24 22 19*   < > 13*   < > 12*   BUN mg/dL 67* 92* 99*   < > 171*   < > 203*   CREATININE mg/dL 1.32* 1.65* 1.91*   < > 5.75*   < > 7.93*   GLUCOSE mg/dL 104* 96 86   < > 136*   < > 92   CALCIUM mg/dL 8.8 8.9 6.9*   < > 8.3*   < > 10.1   MAGNESIUM mg/dL 1.57* 1.87 1.71   < > 2.84*   < > 3.10*   PHOSPHORUS mg/dL 2.5 3.2 3.4   < > 9.1*   < >  --    ALBUMIN g/dL 3.1* 3.3* 2.4*   < > 3.3*   < > 3.8   ALK PHOS U/L  --   --   --   --   --   --  175*   ALT U/L  --   --   --   --   --   --  6*   AST U/L  --   --   --   --   --   --  6*   BILIRUBIN TOTAL mg/dL  --   --   --   --   --   --  0.5    < > = values in this interval not displayed.     Scheduled medications  Scheduled Medications[1]  Continuous medications  Continuous Medications[2]  PRN medications  PRN Medications[3]         Assessment/Plan   Cam Evans is a 59 y.o. male with PMHx of CHF (EF 20% on 2021), iron deficiency anemia, HTN, and dyslipidemia, presenting with generalized weakness and fatigue and loss of appetite for the past week. Had history of bowel habit change with constipation for the past month. Hypotensive at ED S/P IV LR 2 L. Labs showing acute renal failure with , Cr 7.93, acidosis with HCO3 of 12. CT CAP wo IV contrast showing 3x3 cm soft tissue mass at rectum with multiple ill-defined hypodense mass at liver and diffuse sclerotic vertebral lesion at T8 and left 5th rib, suspected metastatic disease.     Updates 05/08/25:  - Continue aggressive bowel regimen and switch bowel regimen from Miralax to stool softener  - Labs from SPEP/UPEP are  still spending to rule out multiple myeloma  - Awaiting results from flex sigmoidoscopy with rectal biopsy pending  - Liver biopsy deferred by oncology from suspicion of neuroendocrine tumor  - Marked improvement of uric acid to <1.5 with good response to allopurinol; IVF discontinued    #Acute renal failure  #Metabolic acidosis 2/2 azotemia vs starvation ketosis improved   #Elevated uric acid improved   :: Prerenal azotemia VS ATN: still produce some urine, no asterixis  :: BUN/Cr 203/7.93, no baseline Cr  :: Hypotensive at initial presentation S/P IV LR 2 L  :: CT: no hydroureteronephrosis/other obstruction visualized   Plan   - Renal consulted, appreciated recs              - S/P 1x dose of rasburicase 5/6   - Fluid repletion discontinued but will continue to monitor    - avoid nephrotoxic agents/medications, keep euvolemia and normotensive    #Rectal mass, 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  :: history of bowel habit change, constipation past month, along with loss of appetite  :: CT CAP: 3x3 cm soft tissue mass at rectum with multiple ill-defined hypodense mass at liver and diffuse sclerotic vertebral lesion at T8 and left 5th rib, suspected metastatic disease  :: Per chart review, history of Cologuard in 2024, but no colonoscopy on record   :: CEA WNL, PSA 5.47,  39.67   Plan  - Flex sig performed on 5/8 and awaiting results from biopsy  - Liver biopsy deferred by oncology d/t suspicion of neuroendocrine tumor    - Will likely need liver biopsy at some point per hematology     #History of HFrEF  #HTN  :: Hypo- to euvolemic upon initial assessment, BP 90-100s/60s at ED  :: TTE 5/6 with EF 25-30%, no valvular abnormalities  Plan   - Hold off spironolactone, losartan in setting of VERO and hypotension   - Strict I/Os  - Will likely require GDMT, however soft BP relatively prohibitive at this time; will introduce as tolerated    #Dyslipidemia  - Continue atorvastatin 40 mg daily      #Iron deficiency anemia  - Continue ferrous sulfate tablet 325 daily     F: PRN  E: replete prn  N: Cardiac; Na-restricted  A: PIVx1     DVT ppx: Heparin 5000 U subcutaneous q 8 hr  GI ppx: None  Bowel regimen: stool softener     Code: Full  NOK: Mali Galan (Mother) [phone] (Mobile)      Patient seen and staffed with attending physician. Recommendations not final until attested.     SKYLER DORADO  MS3, [facility] RONALD          [1] allopurinol, 300 mg, oral, Daily  aspirin, 81 mg, oral, Daily  atorvastatin, 40 mg, oral, Nightly  ferrous sulfate, 65 mg of iron, oral, Daily with breakfast  [Held by provider] heparin (porcine), 5,000 Units, subcutaneous, q8h ANTONIO  insulin lispro, 0-5 Units, subcutaneous, TID AC  magnesium sulfate, 4 g, intravenous, Once  polyethylene glycol, 17 g, oral, Daily  sennosides-docusate sodium, 2 tablet, oral, BID  [2]    [3] PRN medications: dextrose, dextrose, glucagon, glucagon

## 2025-05-09 LAB
ALBUMIN: 3 G/DL (ref 3.4–5)
ALPHA 1 GLOBULIN: 0.7 G/DL (ref 0.2–0.6)
ALPHA 2 GLOBULIN: 1.2 G/DL (ref 0.4–1.1)
BACTERIA BLD CULT: NORMAL
BACTERIA BLD CULT: NORMAL
BETA GLOBULIN: 0.9 G/DL (ref 0.5–1.2)
GAMMA GLOBULIN: 1.9 G/DL (ref 0.5–1.4)
IMMUNOFIXATION COMMENT: ABNORMAL
PATH REVIEW - SERUM IMMUNOFIXATION: ABNORMAL
PATH REVIEW-SERUM PROTEIN ELECTROPHORESIS: ABNORMAL
PROTEIN ELECTROPHORESIS COMMENT: ABNORMAL

## 2025-05-09 PROCEDURE — 2500000001 HC RX 250 WO HCPCS SELF ADMINISTERED DRUGS (ALT 637 FOR MEDICARE OP)

## 2025-05-09 PROCEDURE — 99233 SBSQ HOSP IP/OBS HIGH 50: CPT

## 2025-05-09 PROCEDURE — 97530 THERAPEUTIC ACTIVITIES: CPT | Mod: GP | Performed by: PHYSICAL THERAPIST

## 2025-05-09 PROCEDURE — 97162 PT EVAL MOD COMPLEX 30 MIN: CPT | Mod: GP | Performed by: PHYSICAL THERAPIST

## 2025-05-09 PROCEDURE — 1210000001 HC SEMI-PRIVATE ROOM DAILY

## 2025-05-09 PROCEDURE — 2500000004 HC RX 250 GENERAL PHARMACY W/ HCPCS (ALT 636 FOR OP/ED)

## 2025-05-09 PROCEDURE — 97165 OT EVAL LOW COMPLEX 30 MIN: CPT | Mod: GO

## 2025-05-09 PROCEDURE — 2500000005 HC RX 250 GENERAL PHARMACY W/O HCPCS

## 2025-05-09 PROCEDURE — 97530 THERAPEUTIC ACTIVITIES: CPT | Mod: GO

## 2025-05-09 RX ORDER — METOPROLOL TARTRATE 25 MG/1
25 TABLET, FILM COATED ORAL 2 TIMES DAILY
Status: DISCONTINUED | OUTPATIENT
Start: 2025-05-09 | End: 2025-05-10 | Stop reason: HOSPADM

## 2025-05-09 RX ADMIN — ASPIRIN 81 MG CHEWABLE TABLET 81 MG: 81 TABLET CHEWABLE at 10:04

## 2025-05-09 RX ADMIN — FERROUS SULFATE TAB 325 MG (65 MG ELEMENTAL FE) 325 MG: 325 (65 FE) TAB at 09:58

## 2025-05-09 RX ADMIN — ACETAMINOPHEN 975 MG: 325 TABLET, FILM COATED ORAL at 06:31

## 2025-05-09 RX ADMIN — LIDOCAINE 1 PATCH: 4 PATCH TOPICAL at 09:58

## 2025-05-09 RX ADMIN — HEPARIN SODIUM 5000 UNITS: 5000 INJECTION, SOLUTION INTRAVENOUS; SUBCUTANEOUS at 22:23

## 2025-05-09 RX ADMIN — HEPARIN SODIUM 5000 UNITS: 5000 INJECTION, SOLUTION INTRAVENOUS; SUBCUTANEOUS at 14:40

## 2025-05-09 RX ADMIN — TRAZODONE HYDROCHLORIDE 50 MG: 50 TABLET ORAL at 22:23

## 2025-05-09 RX ADMIN — ATORVASTATIN CALCIUM 40 MG: 40 TABLET, FILM COATED ORAL at 22:23

## 2025-05-09 RX ADMIN — HEPARIN SODIUM 5000 UNITS: 5000 INJECTION, SOLUTION INTRAVENOUS; SUBCUTANEOUS at 06:32

## 2025-05-09 RX ADMIN — METOPROLOL TARTRATE 25 MG: 25 TABLET, FILM COATED ORAL at 09:58

## 2025-05-09 RX ADMIN — ALLOPURINOL 300 MG: 300 TABLET ORAL at 10:34

## 2025-05-09 RX ADMIN — ACETAMINOPHEN 975 MG: 325 TABLET, FILM COATED ORAL at 22:26

## 2025-05-09 RX ADMIN — METOPROLOL TARTRATE 25 MG: 25 TABLET, FILM COATED ORAL at 22:23

## 2025-05-09 ASSESSMENT — COGNITIVE AND FUNCTIONAL STATUS - GENERAL
MOBILITY SCORE: 23
CLIMB 3 TO 5 STEPS WITH RAILING: A LITTLE
MOBILITY SCORE: 20
TURNING FROM BACK TO SIDE WHILE IN FLAT BAD: A LITTLE
DAILY ACTIVITIY SCORE: 24
MOVING TO AND FROM BED TO CHAIR: A LITTLE
MOVING TO AND FROM BED TO CHAIR: A LITTLE
MOBILITY SCORE: 23
CLIMB 3 TO 5 STEPS WITH RAILING: TOTAL
STANDING UP FROM CHAIR USING ARMS: A LITTLE
DAILY ACTIVITIY SCORE: 24
CLIMB 3 TO 5 STEPS WITH RAILING: A LITTLE
CLIMB 3 TO 5 STEPS WITH RAILING: A LITTLE
MOBILITY SCORE: 16
DAILY ACTIVITIY SCORE: 24
MOVING FROM LYING ON BACK TO SITTING ON SIDE OF FLAT BED WITH BEDRAILS: A LITTLE
DAILY ACTIVITIY SCORE: 24
WALKING IN HOSPITAL ROOM: A LITTLE
STANDING UP FROM CHAIR USING ARMS: A LITTLE
WALKING IN HOSPITAL ROOM: A LITTLE

## 2025-05-09 ASSESSMENT — PAIN DESCRIPTION - LOCATION
LOCATION: BACK
LOCATION: BACK

## 2025-05-09 ASSESSMENT — PAIN - FUNCTIONAL ASSESSMENT
PAIN_FUNCTIONAL_ASSESSMENT: 0-10

## 2025-05-09 ASSESSMENT — PAIN SCALES - GENERAL
PAINLEVEL_OUTOF10: 2
PAINLEVEL_OUTOF10: 4
PAINLEVEL_OUTOF10: 2
PAINLEVEL_OUTOF10: 0 - NO PAIN
PAINLEVEL_OUTOF10: 2
PAINLEVEL_OUTOF10: 4
PAINLEVEL_OUTOF10: 3

## 2025-05-09 ASSESSMENT — ACTIVITIES OF DAILY LIVING (ADL)
BATHING_ASSISTANCE: INDEPENDENT
ADL_ASSISTANCE: INDEPENDENT
ADL_ASSISTANCE: INDEPENDENT

## 2025-05-09 ASSESSMENT — PAIN DESCRIPTION - ORIENTATION
ORIENTATION: LOWER
ORIENTATION: LOWER

## 2025-05-09 NOTE — PROGRESS NOTES
Internal Medicine Progress Note    Cam Evans is a 59 y.o. male on hospital day 4 of admission presenting with Acute renal failure, unspecified acute renal failure type.    Subjective   NAEON. Patient reports slept better this morning. Endorsing improved back pain with warm compresses. Denies chest pain, shortness of breath, N/V/D. Had a non-bloody bowel movement overnight.         Objective     Physical Exam  Vitals reviewed.   Constitutional:       General: He is awake. He is not in acute distress.     Appearance: Normal appearance.   Eyes:      Extraocular Movements: Extraocular movements intact.   Cardiovascular:      Rate and Rhythm: Regular rhythm.      Heart sounds: Normal heart sounds. No murmur heard.  Pulmonary:      Effort: Pulmonary effort is normal. No respiratory distress.      Breath sounds: Normal breath sounds.   Abdominal:      General: Abdomen is flat. Bowel sounds are normal.      Palpations: Abdomen is soft.      Tenderness: There is no abdominal tenderness.   Musculoskeletal:      Right lower leg: No edema.      Left lower leg: No edema.   Skin:     General: Skin is warm and dry.   Neurological:      Mental Status: He is alert. Mental status is at baseline.   Psychiatric:         Behavior: Behavior is cooperative.         Last Recorded Vitals  Blood pressure 106/70, pulse (!) 118, temperature 36.5 °C (97.7 °F), temperature source Temporal, resp. rate 16, height 1.829 m (6'), weight 87.7 kg (193 lb 4.8 oz), SpO2 99%.  Intake/Output last 24h:    Intake/Output Summary (Last 24 hours) at 5/9/2025 1443  Last data filed at 5/9/2025 1401  Gross per 24 hour   Intake 1220 ml   Output 400 ml   Net 820 ml       Relevant Results  Results from last 72 hours   Lab Units 05/08/25  0448 05/07/25  1632 05/07/25  0422   WBC AUTO x10*3/uL 11.0  --  9.1   HEMOGLOBIN g/dL 10.0*  --  8.5*   HEMATOCRIT % 31.2*  --  26.0*   PLATELETS AUTO x10*3/uL 449  --  368   SODIUM mmol/L 141 139 139   POTASSIUM mmol/L 4.6  4.4 4.1   CHLORIDE mmol/L 107 107 112*   CO2 mmol/L 24 22 19*   BUN mg/dL 67* 92* 99*   CREATININE mg/dL 1.32* 1.65* 1.91*   GLUCOSE mg/dL 104* 96 86   CALCIUM mg/dL 8.8 8.9 6.9*   MAGNESIUM mg/dL 1.57* 1.87 1.71   PHOSPHORUS mg/dL 2.5 3.2 3.4   ALBUMIN g/dL 3.1* 3.3* 2.4*     Scheduled medications  Scheduled Medications[1]  Continuous medications  Continuous Medications[2]  PRN medications  PRN Medications[3]         Assessment/Plan   Cam Evans is a 59 y.o. male with PMHx of CHF (EF 20% on 2021), iron deficiency anemia, HTN, and dyslipidemia, presenting with generalized weakness and fatigue and loss of appetite for the past week. Had history of bowel habit change with constipation for the past month. Hypotensive at ED S/P IV LR 2 L. Labs showing acute renal failure with , Cr 7.93, acidosis with HCO3 of 12. CT CAP wo IV contrast showing 3x3 cm soft tissue mass at rectum with multiple ill-defined hypodense mass at liver and diffuse sclerotic vertebral lesion at T8 and left 5th rib, suspected metastatic disease. Now s/p      Updates 05/09/25:  - Rectal biopsy and SPEP/UPEP still pending  - No acute changes over past 24 hours  - Back pain better with lidocaine patches and warm compresses  - Remains inpatient pending determination of need for inpatient chemo      #Acute renal failure, improving  #Metabolic acidosis 2/2 azotemia vs starvation ketosis improved   #Elevated uric acid improved   :: Prerenal azotemia VS ATN: still produce some urine, no asterixis  :: BUN/Cr 203/7.93, no baseline Cr  :: Hypotensive at initial presentation S/P IV LR 2 L  :: CT: no hydroureteronephrosis/other obstruction visualized   Plan   - Renal consulted, appreciated recs              - S/P 1x dose of rasburicase 5/6   - Fluid repletion discontinued but will continue to monitor    - avoid nephrotoxic agents/medications, maintain euvolemia and normotension     #Rectal mass, 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 with metastatic liver & bone  disease  :: history of bowel habit change, constipation past month, along with loss of appetite  :: CT CAP: 3x3 cm soft tissue mass at rectum with multiple ill-defined hypodense mass at liver and diffuse sclerotic vertebral lesion at T8 and left 5th rib, suspected metastatic disease  :: Per chart review, history of Cologuard in 2024, but no colonoscopy on record   :: CEA WNL, PSA 5.47,  39.67   Plan  - Flex sig performed on 5/8 with malignant-appearing rectal mass, awaiting results from biopsy  - Liver biopsy deferred by oncology d/t suspicion of neuroendocrine tumor    - Will likely need liver biopsy at some point per hematology   - MR abdomen/pelvis pending to characterize liver and bone lesions      #History of HFrEF  #HTN  :: Hypo- to euvolemic upon initial assessment, BP 90-100s/60s at ED  :: TTE 5/6 with EF 25-30%, no valvular abnormalities  Plan   - Hold off spironolactone, losartan in setting of soft BPs   - Strict I/Os   - Will likely require GDMT, however soft BPs relatively prohibitive at this time; will introduce as tolerated     #Dyslipidemia  - Continue atorvastatin 40 mg daily     #Iron deficiency anemia  - Continue ferrous sulfate tablet 325 daily     F: PRN  E: replete prn  N: Cardiac; Na-restricted  A: PIVx1     DVT ppx: Heparin 5000 U subcutaneous q 8 hr  GI ppx: None  Bowel regimen: stool softener     Code: Full  NOK: Mali Galan (Mother) [phone] (Mobile)          Patient seen and staffed with attending physician. Recommendations not final until attested.     Rod Alford MD  PGY-1, Internal Medicine           [1] allopurinol, 300 mg, oral, Daily  aspirin, 81 mg, oral, Daily  atorvastatin, 40 mg, oral, Nightly  ferrous sulfate, 65 mg of iron, oral, Daily with breakfast  heparin (porcine), 5,000 Units, subcutaneous, q8h ANTONIO  lidocaine, 1 patch, transdermal, Daily  metoprolol tartrate, 25 mg, oral, BID  polyethylene glycol, 17 g, oral, Daily  psyllium, 1 packet, oral,  TID  sennosides-docusate sodium, 2 tablet, oral, BID  traZODone, 50 mg, oral, Nightly     [2]    [3] PRN medications: acetaminophen

## 2025-05-09 NOTE — SIGNIFICANT EVENT
Brief Oncology Note:     Cam Evans is a 59 y.o. male PMHx HFrEF (EF 25% 5/2025), HTN, HLD, anemia who presented with generalized weakness and fatigue, admitted for acute renal failure, found to have spontaneous TLS.       On CT found to have rectal mass, extensive liver mets, sclerotic bone lesions. Overall picture is concerning for metastatic cancer, suspect primary rectal malignancy.      Flex sig performed 5/7/25 with biopsy obtained from rectal mass, pending.   VERO improving after IVF and rasburicase. Cr improved most recently to 1.32 and patient reports good PO intake, has been off IVF for last 2 days.     MRI abdomen performed to better characterize liver lesions, pending final read.     Patient seen today and reports feeling much better. He has been out of bed and ambulating without issue. He reports feeling well enough to go home and expressed good understanding of importance of close follow up care.     Recommendations:  -Given significant clinical improvement, he can be discharged from med onc stand point with close outpatient follow up   -Follow up final pathology from rectal biopsy from 5/7   -Outpatient med onc appointment scheduled for 5/22 (next earliest available NPV)   -Continue allopurinol daily for TLS ppx, please ensure patient has home going script     Case discussed with Dr. Chay Elizondo MD  Hematology-Oncology Fellow, PGY4  Oncology Consult Pager: [phone]

## 2025-05-09 NOTE — CARE PLAN
Problem: Fall/Injury  Goal: Not fall by end of shift  Outcome: Progressing  Goal: Be free from injury by end of the shift  Outcome: Progressing  Goal: Verbalize understanding of personal risk factors for fall in the hospital  Outcome: Progressing     Problem: Pain  Goal: Walks with improved pain control throughout the shift  Outcome: Progressing  Goal: Performs ADL's with improved pain control throughout shift  Outcome: Progressing   The patient's goals for the shift include      The clinical goals for the shift include hemodynamicaly stable, fall precaution at all times, reminded about strict I and O monitoring- advised to use urinal and weight in AM.

## 2025-05-09 NOTE — DISCHARGE INSTRUCTIONS
Dear Cam Evans,    You were admitted to [facility] on 5/5/2025 for the following problems:   Kidney injury: Your kidneys were injured likely in the setting of recent dehydration and poor oral intake. Your kidney function has improved with IV hydration and medications to help reduce the uric acid levels in your blood.  Rectal tumor: You underwent scans that demonstrated a tumor in your rectum along with lesions on your liver and in your bones that are concerning for cancer. A biopsy was taken of the rectal mass - the results are still pending. You should plan to follow up with [facility] to discuss the results of your biopsy and available treatment options.     We are happy you are well enough to leave the hospital.     Below are important instructions to follow after you leave the hospital:   Please review the medication changes listed in this After Visit Summary.   Maintain adequate hydration and nutrition in order to minimize the risk of future injury to your kidneys.     You should plan to follow up with your primary care provider, as well as with the following providers:   [facility] May 22nd 2025 at 2pm    Referrals have been sent to the offices above, and you should be contacted to schedule an appointment if you have not done so already.     If you need to schedule, reschedule or cancel an appointment, the central scheduling service can be reached at [phone].    Thank you for allowing us to be a part of your care.     - Your  Care Team

## 2025-05-09 NOTE — PROGRESS NOTES
Physical Therapy    Physical Therapy Evaluation & Treatment    Patient Name: Cam Evans  MRN: [id number]  Department: [place] [address]  Room: [facility]  Today's Date: 5/9/2025   Time Calculation  Start Time: 1503  Stop Time: 1526  Time Calculation (min): 23 min    Assessment/Plan   PT Assessment  PT Assessment Results: Decreased strength, Decreased endurance, Impaired balance, Decreased mobility  Rehab Prognosis: Good  Barriers to Discharge Home: No anticipated barriers  Evaluation/Treatment Tolerance: Patient limited by fatigue  Strengths: Attitude of self, Ability to acquire knowledge, Premorbid level of function  Barriers to Participation: Comorbidities  End of Session Communication: Bedside nurse  Assessment Comment: Pt presents with decreased strength, balance and endurance. Pt limited by fatigue during session. Pt would benefit from continued PT to improve independecnce with all mobility to return to proir level of function.  End of Session Patient Position: Bed, 3 rail up, Alarm off, not on at start of session   IP OR SWING BED PT PLAN  Inpatient or Swing Bed: Inpatient  PT Plan  Treatment/Interventions: Bed mobility, Transfer training, Gait training, Stair training, Balance training, Strengthening, Endurance training, Therapeutic exercise, Therapeutic activity, Positioning  PT Plan: Ongoing PT  PT Frequency: 3 times per week  PT Discharge Recommendations: Low intensity level of continued care  PT Recommended Transfer Status: Assist x1  PT - OK to Discharge: Yes      Subjective     General Visit Information:  General  Reason for Referral: Presenting with generalized weakness, fatigue, flank and lower back pain, and constipation. Hypotensive at ED S/P IV LR 2 L. Labs showing acute renal failure with , Cr 7.93, acidosis with HCO3 of 12. CT CAP wo IV contrast showing 3x3 cm soft tissue mass at rectum with multiple ill-defined hypodense mass at liver and diffuse sclerotic vertebral lesion at T8 and left 5th  rib, suspected metastatic disease  Past Medical History Relevant to Rehab: CHF (EF 20% on 2021), iron deficiency anemia, HTN, and dyslipidemia.  Prior to Session Communication: Bedside nurse  Patient Position Received: Bed, 3 rail up, Alarm off, not on at start of session  Preferred Learning Style: verbal  General Comment: Pt supine in bed upon arrival and willing to participate. Reports fatigue from lack of sleep  Home Living:  Home Living  Type of Home: Apartment (1st floor)  Lives With: Alone  Home Adaptive Equipment: None  Home Layout: One level, Laundry main level  Home Access: Stairs to enter with rails  Entrance Stairs-Number of Steps: 4 (1 handrail)  Bathroom Shower/Tub: Tub/shower unit  Prior Level of Function:  Prior Function Per Pt/Caregiver Report  Level of [place]: Independent with ADLs and functional transfers  ADL Assistance: Independent  Ambulatory Assistance: Independent  Vocational: On disability  Hand Dominance: Right  Prior Function Comments: -ve drives, denies falls, mother drives pt  Precautions:  Precautions  Medical Precautions: Fall precautions (protective spinal precuations)     Date/Time Vitals Session Patient Position Pulse Resp SpO2 BP MAP (mmHg)    05/09/25 1503 During PT  Sitting  92  --  --  116/81  --     05/09/25 1556 --  --  94  16  96 %  121/78  --           Vital Signs Comment: pre-session supine:103/70, 86 bpm    Objective   Pain:  Pain Assessment  Pain Assessment: 0-10  0-10 (Numeric) Pain Score: 0 - No pain (reports c/o back pain before, no pain during session)  Cognition:  Cognition  Overall Cognitive Status: Within Functional Limits  Arousal/Alertness: Appropriate responses to stimuli  Orientation Level: Oriented X4    General Assessments:       Sensation  Light Touch: No apparent deficits    Strength  Strength Comments: BLE's 4/5 based on mobility       Static Sitting Balance  Static Sitting-Level of Assistance: Modified independent  Dynamic Sitting Balance  Dynamic  Sitting-Level of Assistance: Distant supervision    Static Standing Balance  Static Standing-Level of Assistance: Close supervision  Dynamic Standing Balance  Dynamic Standing-Level of Assistance: Contact guard  Functional Assessments:  Bed Mobility  Bed Mobility: Yes  Bed Mobility 1  Bed Mobility 1: Supine to sitting  Level of Assistance 1: Close supervision, Minimal verbal cues  Bed Mobility Comments 1: HOB elevated, bedrail, cues for log roll (Increased time to complete with rest breaks in between 2/2 fatigue)    Transfers  Transfer: Yes  Transfer 1  Transfer From 1: Sit to, Stand to  Transfer to 1: Sit, Stand  Technique 1: Sit to stand, Stand to sit  Transfer Level of Assistance 1: Minimal verbal cues, Contact guard  Trials/Comments 1: cues for safe hand placement stand >sit    Ambulation/Gait Training  Ambulation/Gait Training Performed: Yes  Ambulation/Gait Training 1  Surface 1: Level tile  Device 1: No device  Assistance 1: Contact guard, Minimal verbal cues  Quality of Gait 1: Diminished heel strike, Decreased step length  Comments/Distance (ft) 1: ~8 ft ,declined more ambulation and use of walker    Stairs  Stairs: No       Treatments:       Therapeutic Activity  Therapeutic Activity Performed: Yes  Therapeutic Activity 1: Pt educated on PT POC, D/C planning, importance of OOB, log roll and protective spinal precuations      Bed Mobility  Bed Mobility: Yes  Bed Mobility 1  Bed Mobility 1: Supine to sitting  Level of Assistance 1: Close supervision, Minimal verbal cues  Bed Mobility Comments 1: HOB elevated, bedrail, cues for log roll (Increased time to complete with rest breaks in between 2/2 fatigue)    Ambulation/Gait Training  Ambulation/Gait Training Performed: Yes  Ambulation/Gait Training 1  Surface 1: Level tile  Device 1: No device  Assistance 1: Contact guard, Minimal verbal cues  Quality of Gait 1: Diminished heel strike, Decreased step length  Comments/Distance (ft) 1: ~8 ft ,declined more  ambulation and use of walker  Transfers  Transfer: Yes  Transfer 1  Transfer From 1: Sit to, Stand to  Transfer to 1: Sit, Stand  Technique 1: Sit to stand, Stand to sit  Transfer Level of Assistance 1: Minimal verbal cues, Contact guard  Trials/Comments 1: cues for safe hand placement stand >sit    Outcome Measures:  [facility] Basic Mobility  Turning from your back to your side while in a flat bed without using bedrails: A little  Moving from lying on your back to sitting on the side of a flat bed without using bedrails: A little  Moving to and from bed to chair (including a wheelchair): A little  Standing up from a chair using your arms (e.g. wheelchair or bedside chair): A little  To walk in hospital room: A little  Climbing 3-5 steps with railing: Total  Basic Mobility - Total Score: 16    Encounter Problems       Encounter Problems (Active)       Balance       Pt will score >/=24/28 on [place] to demonstrate decreased falls risk (Progressing)       Start:  05/09/25    Expected End:  05/23/25               Mobility       Pt will be Ba ambulation 150 ft with LRAD (Progressing)       Start:  05/09/25    Expected End:  05/23/25            Pt will be Ba ascend/descend 4 steps with 1 handrail (Progressing)       Start:  05/09/25    Expected End:  05/23/25               PT Transfers       Pt will be Ba with sit to stand and bed to chair tansfers with LRAD (Progressing)       Start:  05/09/25    Expected End:  05/23/25            Pt will be Ba with bed mobility (Progressing)       Start:  05/09/25    Expected End:  05/23/25                   Education Documentation  Precautions, taught by Starr Hernandez PT at 5/9/2025  3:46 PM.  Learner: Patient  Readiness: Acceptance  Method: Explanation  Response: Verbalizes Understanding, Needs Reinforcement  Comment: PT POC, D/C planning, importance of OOB    Body Mechanics, taught by Starr Hernandez PT at 5/9/2025  3:46 PM.  Learner: Patient  Readiness: Acceptance  Method:  Explanation  Response: Verbalizes Understanding, Needs Reinforcement  Comment: PT POC, D/C planning, importance of OOB    Mobility Training, taught by Starr Hernandez PT at 5/9/2025  3:46 PM.  Learner: Patient  Readiness: Acceptance  Method: Explanation  Response: Verbalizes Understanding, Needs Reinforcement  Comment: PT POC, D/C planning, importance of OOB    Education Comments  No comments found.

## 2025-05-09 NOTE — PROGRESS NOTES
"Occupational Therapy    Evaluation    Patient Name: Cam Evans  MRN: [id number]  Today's Date: 5/9/2025  Room: [address] [place], [place] [id number]  Time Calculation  Start Time: 1108  Stop Time: 1135  Time Calculation (min): 27 min    Assessment  IP OT Assessment  OT Assessment: PATIENT ABLE TO PERFORM UB/LB DRESSING, FXAL MOBILITY, AND FXAL TF INDEPENDENTLY FROM EOB AND TOILET. HE WAS INSTRUCTED FOR USE OF THE LOG ROLL TECHNIQUE FOR SUPINE < > SIT TF TO DECREASE BACK PAIN DURING BED MOBILITY. HE STATED; \"I CAN DO FOR MYSELF WITHOUT ANY HELP... I AM FINE. I WOULD BE BETTER IF I COULD ONLY USE THE SHOWER NEXT.\" HE WAS INSTRUCTED TO REQUEST CLEARANCE FOR SHOWER USE AND SAFETY PRECAUTIONS + ASSISTANCE FROM RN/STAFF. OT SERVICES TO BE DISCONTNUED AT THIS TIME.  Evaluation/Treatment Tolerance: Patient tolerated treatment well  Medical Staff Made Aware: Yes  End of Session Communication: Bedside nurse  End of Session Patient Position: Bed, 2 rail up, Alarm off, not on at start of session  Plan:  OT Assessment  Evaluation/Treatment Tolerance: Patient tolerated treatment well  Medical Staff Made Aware: Yes  Strengths: Attitude of self, Ability to acquire knowledge, Capable of completing ADLs semi/independent, Housing layout, Insight into problems, Living arrangement secure, Premorbid level of function, Support of Caregivers    Subjective   Current Problem:  1. Acute renal failure, unspecified acute renal failure type  Surgical Pathology Exam    Surgical Pathology Exam    CANCELED: Surgical Pathology Exam    CANCELED: Surgical Pathology Exam      2. Hypotension due to hypovolemia  Transthoracic Echo Complete    Transthoracic Echo Complete    Surgical Pathology Exam    Surgical Pathology Exam    CANCELED: Surgical Pathology Exam    CANCELED: Surgical Pathology Exam      3. Rectal mass  Flexible Sigmoidoscopy    Flexible Sigmoidoscopy    Surgical Pathology Exam    Surgical Pathology Exam    Referral to [facility]    Referral To " Hematology and Oncology    CANCELED: Surgical Pathology Exam    CANCELED: Surgical Pathology Exam      4. Abnormal findings on diagnostic imaging of other parts of digestive tract  Flexible Sigmoidoscopy    Flexible Sigmoidoscopy    Surgical Pathology Exam    Surgical Pathology Exam    CANCELED: Surgical Pathology Exam    CANCELED: Surgical Pathology Exam        General:  Reason for Referral: Presenting with generalized weakness, fatigue, flank and lower back pain, and constipation.  Past Medical History Relevant to Rehab: CHF (EF 20% on 2021), iron deficiency anemia, HTN, and dyslipidemia.  Prior to Session Communication: Bedside nurse  Patient Position Received: Bed, 2 rail up, Alarm off, not on at start of session (SEATED AT EOB.)  Family/Caregiver Present: No  General Comment: PATIENT WAS SEATED AT EOB AT START OF SESSION. HE WAS PLEASANT AND AGREEABLE TO PARTICIPATION.   Precautions:  Hearing/Visual Limitations: WFL  Medical Precautions: Fall precautions  Vital Signs:   Date/Time Vitals Session Patient Position Pulse Resp SpO2 BP MAP (mmHg)    05/09/25 1503 During PT  Sitting  92  --  --  116/81  --           Pain:  Pain Assessment  Pain Assessment: 0-10  0-10 (Numeric) Pain Score: 2  Pain Type: Acute pain  Pain Location: Back  Pain Orientation: Mid, Lower  Lines/Tubes/Drains:    Objective   Cognition:  Overall Cognitive Status: Within Functional Limits  Attention: Within Functional Limits   Home Living:  Type of Home: Apartment (1ST FLR)  Lives With: Alone (MOM IN AREA)  Home Adaptive Equipment: None  Home Layout: One level  Home Access: Stairs to enter with rails  Entrance Stairs-Rails: Left  Entrance Stairs-Number of Steps: 7  Bathroom Shower/Tub: Tub/shower unit  Bathroom Toilet: Standard  Bathroom Equipment: None  Home Living Comments: PATIENT REPORTS TOTAL INDEPENDENCE FOR ALL I/ADL TASKS   Prior Function:  Level of [place]: Independent with ADLs and functional transfers, Independent with homemaking  with ambulation  Receives Help From: Family (MOM PROVIDES PRN TRANSPORTATION ASSISTANCE; PATIENT DOES NOT DRIVE)  ADL Assistance: Independent  Homemaking Assistance: Independent  Ambulatory Assistance: Independent (> MOD I REPORTS WALL WLAKING IF TIRED/AS NEEDED)  Vocational: On disability  Leisure: WALKING AND TV FOR SPORTS + [place]  Hand Dominance: Right  IADL History:  Homemaking Responsibilities: Yes  Meal Prep Responsibility: Primary  Laundry Responsibility: Primary  Cleaning Responsibility: Primary  Bill Paying/Finance Responsibility: Primary  Shopping Responsibility: Primary  Current License: No  Mode of Transportation: Bus, Walk, Family  ADL:  Eating Assistance: Independent  Grooming Assistance: Independent  Grooming Deficit: Wash/dry hands (STANDING AT SINK)  Bathing Assistance: Independent  Bathing Deficit:  (ANTICIPATED)  UE Dressing Assistance: Independent  LE Dressing Assistance: Independent  Toileting Assistance with Device: Independent  Activity Tolerance:  Endurance: Endurance does not limit participation in activity  Balance:  Dynamic Sitting Balance  Dynamic Sitting-Balance Support: No upper extremity supported  Dynamic Sitting-Level of Assistance: Independent  Static Sitting Balance  Static Sitting-Balance Support: No upper extremity supported, Feet supported  Static Sitting-Level of Assistance: Independent  Static Standing Balance  Static Standing-Balance Support: No upper extremity supported  Static Standing-Level of Assistance: Independent  Bed Mobility/Transfers: Bed Mobility  Bed Mobility: Yes  Bed Mobility 1  Bed Mobility 1: Supine to sitting, Sitting to supine  Level of Assistance 1: Contact guard  Bed Mobility 2  Bed Mobility  2: Supine to sitting, Sitting to supine  Level of Assistance 2: Modified independent  Bed Mobility Comments 2: S/P INSTRUCTION AND USE OF LOG ROLL TECHNIQUE  Bed Mobility 3  Bed Mobility 3: Rolling right, Rolling left  Level of Assistance 3: Modified  independent  Functional Mobility  Functional Mobility Performed: Yes  Functional Mobility 1  Surface 1: Level tile  Device 1: No device  Assistance 1: Independent (BED < >  BATHROOM)  Transfers  Transfer: Yes  Transfer 1  Transfer From 1: Bed to  Transfer to 1: Stand  Technique 1: Sit to stand, Stand to sit  Transfer Level of Assistance 1: Independent  Transfers 2  Transfer From 2: Toilet to  Transfer to 2: Stand  Technique 2: Sit to stand, Stand to sit  Transfer Level of Assistance 2: Independent  IADL's:   Homemaking Responsibilities: Yes  Meal Prep Responsibility: Primary  Laundry Responsibility: Primary  Cleaning Responsibility: Primary  Bill Paying/Finance Responsibility: Primary  Shopping Responsibility: Primary  Current License: No  Mode of Transportation: Bus, Walk, Family  Vision: Vision - Basic Assessment  Current Vision: Wears glasses only for reading  Patient Visual Report:  (WFL)  Sensation:  Light Touch: No apparent deficits  Strength:  Strength Comments: FUNCTIONALLY BUE 5/5  Perception:  Inattention/Neglect: Appears intact  Initiation: Appears intact  Motor Planning: Appears intact  Perseveration: Not present  Coordination:  Movements are Fluid and Coordinated: Yes   Hand Function:  Hand Function  Gross Grasp: Functional  Coordination: Functional  Extremities: RUE   RUE : Within Functional Limits, LUE   LUE: Within Functional Limits,  , and      Outcome Measures: [facility] Daily Activity  Putting on and taking off regular lower body clothing: None  Bathing (including washing, rinsing, drying): None  Putting on and taking off regular upper body clothing: None  Toileting, which includes using toilet, bedpan or urinal: None  Taking care of personal grooming such as brushing teeth: None  Eating Meals: None  Daily Activity - Total Score: 24    Education Documentation  No documentation found.  Education Comments  No comments found.      05/09/25 at 3:50 PM   Madina Vera OT   Rehab Office: [phone]

## 2025-05-10 ENCOUNTER — PHARMACY VISIT (OUTPATIENT)
Dept: PHARMACY | Facility: CLINIC | Age: 60
End: 2025-05-10
Payer: COMMERCIAL

## 2025-05-10 ENCOUNTER — DOCUMENTATION (OUTPATIENT)
Dept: HOME HEALTH SERVICES | Facility: HOME HEALTH | Age: 60
End: 2025-05-10
Payer: MEDICARE

## 2025-05-10 ENCOUNTER — HOME HEALTH ADMISSION (OUTPATIENT)
Dept: HOME HEALTH SERVICES | Facility: HOME HEALTH | Age: 60
End: 2025-05-10
Payer: MEDICARE

## 2025-05-10 ENCOUNTER — HOME CARE VISIT (OUTPATIENT)
Dept: HOME HEALTH SERVICES | Facility: HOME HEALTH | Age: 60
End: 2025-05-10

## 2025-05-10 VITALS
BODY MASS INDEX: 24.45 KG/M2 | WEIGHT: 180.5 LBS | DIASTOLIC BLOOD PRESSURE: 79 MMHG | HEIGHT: 72 IN | TEMPERATURE: 98.2 F | RESPIRATION RATE: 18 BRPM | SYSTOLIC BLOOD PRESSURE: 120 MMHG | OXYGEN SATURATION: 99 % | HEART RATE: 82 BPM

## 2025-05-10 LAB
ALBUMIN SERPL BCP-MCNC: 2.9 G/DL (ref 3.4–5)
ANION GAP SERPL CALC-SCNC: 13 MMOL/L (ref 10–20)
BUN SERPL-MCNC: 19 MG/DL (ref 6–23)
CALCIUM SERPL-MCNC: 8.7 MG/DL (ref 8.6–10.6)
CHLORIDE SERPL-SCNC: 106 MMOL/L (ref 98–107)
CO2 SERPL-SCNC: 23 MMOL/L (ref 21–32)
CREAT SERPL-MCNC: 1.11 MG/DL (ref 0.5–1.3)
EGFRCR SERPLBLD CKD-EPI 2021: 76 ML/MIN/1.73M*2
GLUCOSE SERPL-MCNC: 201 MG/DL (ref 74–99)
PHOSPHATE SERPL-MCNC: 1.8 MG/DL (ref 2.5–4.9)
POTASSIUM SERPL-SCNC: 4.4 MMOL/L (ref 3.5–5.3)
SODIUM SERPL-SCNC: 138 MMOL/L (ref 136–145)
URATE SERPL-MCNC: <1.5 MG/DL (ref 4–7.5)

## 2025-05-10 PROCEDURE — 2500000001 HC RX 250 WO HCPCS SELF ADMINISTERED DRUGS (ALT 637 FOR MEDICARE OP)

## 2025-05-10 PROCEDURE — 80069 RENAL FUNCTION PANEL: CPT

## 2025-05-10 PROCEDURE — 99239 HOSP IP/OBS DSCHRG MGMT >30: CPT | Performed by: INTERNAL MEDICINE

## 2025-05-10 PROCEDURE — 84550 ASSAY OF BLOOD/URIC ACID: CPT

## 2025-05-10 PROCEDURE — RXMED WILLOW AMBULATORY MEDICATION CHARGE

## 2025-05-10 PROCEDURE — 36415 COLL VENOUS BLD VENIPUNCTURE: CPT

## 2025-05-10 RX ORDER — ACETAMINOPHEN 325 MG/1
975 TABLET ORAL EVERY 8 HOURS PRN
Qty: 30 TABLET | Refills: 0 | Status: SHIPPED | OUTPATIENT
Start: 2025-05-10

## 2025-05-10 RX ORDER — ALLOPURINOL 300 MG/1
300 TABLET ORAL DAILY
Qty: 30 TABLET | Refills: 1 | Status: SHIPPED | OUTPATIENT
Start: 2025-05-10

## 2025-05-10 RX ORDER — LIDOCAINE 560 MG/1
1 PATCH PERCUTANEOUS; TOPICAL; TRANSDERMAL DAILY
Qty: 10 PATCH | Refills: 3 | Status: SHIPPED | OUTPATIENT
Start: 2025-05-10

## 2025-05-10 RX ADMIN — METOPROLOL TARTRATE 25 MG: 25 TABLET, FILM COATED ORAL at 09:12

## 2025-05-10 RX ADMIN — PSYLLIUM HUSK 1 PACKET: 3.4 POWDER ORAL at 10:51

## 2025-05-10 RX ADMIN — ALLOPURINOL 300 MG: 300 TABLET ORAL at 09:13

## 2025-05-10 RX ADMIN — ACETAMINOPHEN 975 MG: 325 TABLET, FILM COATED ORAL at 06:23

## 2025-05-10 RX ADMIN — ASPIRIN 81 MG CHEWABLE TABLET 81 MG: 81 TABLET CHEWABLE at 09:12

## 2025-05-10 RX ADMIN — FERROUS SULFATE TAB 325 MG (65 MG ELEMENTAL FE) 325 MG: 325 (65 FE) TAB at 10:50

## 2025-05-10 ASSESSMENT — PAIN - FUNCTIONAL ASSESSMENT
PAIN_FUNCTIONAL_ASSESSMENT: 0-10

## 2025-05-10 ASSESSMENT — COGNITIVE AND FUNCTIONAL STATUS - GENERAL
MOBILITY SCORE: 21
STANDING UP FROM CHAIR USING ARMS: A LITTLE
WALKING IN HOSPITAL ROOM: A LITTLE
STANDING UP FROM CHAIR USING ARMS: A LITTLE
CLIMB 3 TO 5 STEPS WITH RAILING: A LITTLE
MOBILITY SCORE: 21
WALKING IN HOSPITAL ROOM: A LITTLE
DAILY ACTIVITIY SCORE: 24
CLIMB 3 TO 5 STEPS WITH RAILING: A LITTLE
DAILY ACTIVITIY SCORE: 24

## 2025-05-10 ASSESSMENT — PAIN SCALES - GENERAL
PAINLEVEL_OUTOF10: 0 - NO PAIN
PAINLEVEL_OUTOF10: 2
PAINLEVEL_OUTOF10: 3

## 2025-05-10 ASSESSMENT — PAIN DESCRIPTION - LOCATION: LOCATION: BACK

## 2025-05-10 NOTE — CARE PLAN
Problem: Fall/Injury  Goal: Be free from injury by end of the shift  Outcome: Progressing  Goal: Verbalize understanding of personal risk factors for fall in the hospital  Outcome: Progressing  Goal: Verbalize understanding of risk factor reduction measures to prevent injury from fall in the home  Outcome: Progressing     Problem: Pain  Goal: Walks with improved pain control throughout the shift  Outcome: Progressing  Goal: Performs ADL's with improved pain control throughout shift  Outcome: Progressing   The patient's goals for the shift include      The clinical goals for the shift include pain treated with pharmacological and non-pharmacological treatment, encouraged to verbalized pain and aske for pain medication if needed. encouraged ambulation if tolerated. strict I ansd O- daily weight. patient informed

## 2025-05-10 NOTE — HH CARE COORDINATION
Home Care received a Referral for Physical Therapy and Occupational Therapy. We have processed the referral for a Start of Care on 05/11/2025.     If you have any questions or concerns, please feel free to contact us at [phone]. Follow the prompts, enter your five digit zip code, and you will be directed to your care team on [facility] 3.

## 2025-05-10 NOTE — DISCHARGE SUMMARY
Discharge Diagnosis  Rectal mass with liver and osseous metastasis  (Biopsy completed - results pending)  Acute renal failure secondary to dehydration  Hyperuricemia (possibly from tumor lysis syndrome)  Compensated systolic heart failure (EF 25 to 30%)    Issues Requiring Follow-Up  -Rectal mass biopsy - follow up with Oncology 5/22   -Follow up kidney function (PCP)  -Home PT referral placed     Discharge Meds     Medication List      START taking these medications     acetaminophen 325 mg tablet; Commonly known as: Tylenol; Take 3 tablets   (975 mg) by mouth every 8 hours if needed for mild pain (1 - 3).   allopurinol 300 mg tablet; Commonly known as: Zyloprim; Take 1 tablet   (300 mg) by mouth once daily.   lidocaine 4 % patch; Place 1 patch over 12 hours on the skin once daily.   Remove & discard patch within 12 hours or as directed by MD.     CONTINUE taking these medications     aspirin 81 mg EC tablet   atorvastatin 40 mg tablet; Commonly known as: Lipitor   cholecalciferol 125 mcg (5,000 units) tablet; Commonly known as: Vitamin   D-3   ferrous sulfate 325 mg (65 mg elemental) tablet   loratadine 10 mg tablet; Commonly known as: Claritin   losartan 100 mg tablet; Commonly known as: Cozaar   metoprolol succinate  mg 24 hr tablet; Commonly known as:   Toprol-XL   spironolactone 50 mg tablet; Commonly known as: Aldactone       Test Results Pending At Discharge  Pending Labs       Order Current Status    Surgical Pathology Exam In process          Hospital Course  Cam Evans is a 59 y.o. male with PMHx of CHF (EF 20% on 2021), iron deficiency anemia, HTN, and dyslipidemia, presenting 5/5 with generalized weakness and fatigue and loss of appetite for the past week. Patient was found to be hypotensive at ED S/P IV LR 2 L. Labs showing acute renal failure with , Cr 7.93, acidosis with HCO3 of 12 and found to have elevated uric acid. CT CAP wo IV contrast showing 3x3 cm soft tissue mass at rectum  with multiple ill-defined hypodense mass at liver and diffuse sclerotic bone lesions, suspected metastatic disease. Patient underwent biopsy of rectal mass 5/7, pending biopsy results. Patient with improvement in kidney function 5/10 and stable for discharge from oncologic stand point. Initial concern for TLS with elevated uric acid, prescribed allopurinol at discharge. Patient to follow up with oncology outpatient and PCP.    Pertinent Physical Exam At Time of Discharge  Physical Exam  Constitutional:       Appearance: Normal appearance.   Cardiovascular:      Rate and Rhythm: Normal rate and regular rhythm.   Pulmonary:      Effort: Pulmonary effort is normal.      Breath sounds: Normal breath sounds.   Abdominal:      General: Abdomen is flat. There is no distension.      Palpations: Abdomen is soft.      Tenderness: There is no abdominal tenderness.   Skin:     General: Skin is warm and dry.   Neurological:      Mental Status: He is alert.   Psychiatric:         Mood and Affect: Mood normal.       Outpatient Follow-Up  Future Appointments   Date Time Provider Department Center   5/22/2025  2:00 PM Pedro Pablo Mazariegos MD [email or web address]       Kristin Carey MD    Attending:  Patient doing well today  Denies any new complaints  No rectal bleeding    PE:  No acute distress  Lungs - clear  CV - S1, S2 - nl  Ext - no edema    Medical Decision Making:  Patient is doing well today.  He has no complaints.  I reviewed his labs.  His renal function is now normal and uric acid is less than 1.5.  I also reviewed his MRI of the abdomen which shows extensive infiltration of the liver with metastatic lesions.      Patient has a follow-up appointment with GI oncology on 5/22.  He will continue allopurinol out of concern for possible tumor lysis syndrome.      35 minutes were spent in discharge management that included evaluation of the patient during rounds, review of labs, radiology, discharge medications and follow-up  advice.

## 2025-05-10 NOTE — PROGRESS NOTES
05/10/25 1319   Discharge Planning   Who is requesting discharge planning? Provider   Home or Post Acute Services In home services   Type of Home Care Services Home OT;Home PT   Expected Discharge Disposition Home H  ([facility] ([facility]   Does the patient need discharge transport arranged? No     This writer observed a discharge order in [facility], PT is recommending LOW intensity therapy post discharge, attempted to meet with pt to complete discharge planning assessment but he had already left the hospital.  Secure chat sent to the team x2 requesting an update on discharge plan. Per team they spoke with pt and he agreed to home PT/OT, referral placed in [facility]. [facility] intake confirmed SOC 5/11 for PT/OT services. Call placed to pt at [phone] and he did confirm he is agreeable to receiving [facility] for home PT/OT services, but asked that I contact him at another time for discharge planning assessment vs allowing [facility] intake to review home care instructions at SOC visit. Pt voiced no additional questions or concerns regarding discharge planning. Care coordinator will continue to follow for discharge planning needs.    Jaxson Guevara RN  Transitional Care Coordinator (TCC)  [phone] or [phone]

## 2025-05-12 ENCOUNTER — TELEPHONE (OUTPATIENT)
Dept: CARE COORDINATION | Facility: CLINIC | Age: 60
End: 2025-05-12
Payer: MEDICARE

## 2025-05-13 ENCOUNTER — HOME CARE VISIT (OUTPATIENT)
Dept: HOME HEALTH SERVICES | Facility: HOME HEALTH | Age: 60
End: 2025-05-13

## 2025-05-15 ENCOUNTER — TELEPHONE (OUTPATIENT)
Dept: HEMATOLOGY/ONCOLOGY | Facility: HOSPITAL | Age: 60
End: 2025-05-15
Payer: MEDICARE

## 2025-05-15 NOTE — TELEPHONE ENCOUNTER
Called pt and left VM. Called pt's mother [place], and she will reach out to Mr. Evans and have him call our office later today.    Deepika COLIN RN   [facility]

## 2025-05-15 NOTE — TELEPHONE ENCOUNTER
Reached pt and he is scheduled for phone visit tomorrow with Sherin MEZA NP at 0930am. Pt will call if there is any other questions.

## 2025-05-16 ENCOUNTER — SOCIAL WORK (OUTPATIENT)
Dept: CASE MANAGEMENT | Facility: HOSPITAL | Age: 60
End: 2025-05-16
Payer: MEDICARE

## 2025-05-16 ENCOUNTER — TELEMEDICINE (OUTPATIENT)
Dept: HEMATOLOGY/ONCOLOGY | Facility: CLINIC | Age: 60
End: 2025-05-16
Payer: MEDICARE

## 2025-05-16 DIAGNOSIS — M89.8X9 LYTIC BONE LESIONS ON XRAY: ICD-10-CM

## 2025-05-16 DIAGNOSIS — K76.9 LIVER LESION: ICD-10-CM

## 2025-05-16 DIAGNOSIS — R97.20 ELEVATED PSA: ICD-10-CM

## 2025-05-16 DIAGNOSIS — K62.89 RECTAL MASS: Primary | ICD-10-CM

## 2025-05-16 DIAGNOSIS — F19.11 SUBSTANCE ABUSE IN REMISSION: ICD-10-CM

## 2025-05-16 ASSESSMENT — ENCOUNTER SYMPTOMS
COUGH: 0
DIFFICULTY URINATING: 0
CHILLS: 0
SPEECH DIFFICULTY: 0
ABDOMINAL PAIN: 0
UNEXPECTED WEIGHT CHANGE: 1
APPETITE CHANGE: 1
SHORTNESS OF BREATH: 0
DIARRHEA: 0
NAUSEA: 0
CONSTIPATION: 0
BACK PAIN: 1
VOMITING: 0
FEVER: 0
TROUBLE SWALLOWING: 0
ENDOCRINE COMMENTS: DENIES NIGHT SWEATS
ARTHRALGIAS: 1

## 2025-05-16 NOTE — DOCUMENTATION CLARIFICATION NOTE
PATIENT:               SU JUNE  [id number]  MRN:                       [id number]  :                       1965  ADMIT DATE:       2025 12:43 PM  DISCH DATE:        5/10/2025 1:15 PM  RESPONDING PROVIDER #:        [id number]          PROVIDER RESPONSE TEXT:    I agree with dietician diagnosis of moderate malnutrition on 25    CDI QUERY TEXT:    Clarification        Instruction:    Based on your assessment of the patient and the clinical information, please provide the requested documentation by clicking on the appropriate radio button and enter any additional information if prompted.    Question: Please further clarify this patient nutritional status as    When answering this query, please exercise your independent professional judgment. The fact that a question is being asked, does not imply that any particular answer is desired or expected.    The patient's clinical indicators include:  Clinical Information:  59 y.o. male with a PMH of HFrEF (20%), ANURAG p/w weakness, abd pain, poor po with N&V, and constipation.  CT A/P-- perirectal mass and hepatic lesions c/f mets as well as multiple sclerotic lesions involving the T8 vertebral body, left posterior fifth rib and multiple areas in the spine and pelvis c/f mets.    Clinical Indicators:   Nutrition consult:    BMI 29.83  Diagnosis Status: New  Malnutrition Diagnosis: Moderate malnutrition related to chronic disease or condition  As Evidenced by: Suspected intake of < or equal to 75% EER for >/ or equal to 1 month, mild-moderate fat loss and muscle wasting    Treatment:  Liberalize diet  Ensure Plus (350 kcal, 13 g pro) vanilla BID    Risk Factors:  concern for malignancy with metastatic disease  Options provided:  -- I agree with dietician diagnosis of moderate malnutrition on 25  -- Other - I will add my own diagnosis  -- Refer to Clinical Documentation Reviewer    Query created by: Liz iKm on 5/15/2025  5:07 PM      Electronically signed by:  KRISTINA MCKINLEY MD 5/16/2025 6:42 AM

## 2025-05-16 NOTE — PROGRESS NOTES
"[facility]  Diagnostic Clinic  New Patient Virtual Visit    Date of Service: 25      Patient Name: Cam Evans   : 1965  MRN: [id number]   PCP: Julia Pedersen, ARTEMIO-CNP   Referred by: David Arreaga MD    Problem: hospital follow-up, path review    HPI: Cam Evans is a 59 y.o. year old male w/ PMH  CHF (EF 20% on ), iron deficiency anemia, HTN, and dyslipidemia, who presents to [facility] for hospital follow-up, whilst awaiting his rectal mass biopsy results, referral placed by Dr Arreaga, inpt team.    Mr. Evans initially presented [place] [facility] [place] on 25 with complaint of generalized weakness, fatigue, and DAYANARA of 1 week duration.     Per the hospital discharge summary, \"Patient was found to be hypotensive at ED S/P IV LR 2 L. Labs showing acute renal failure with , Cr 7.93, acidosis with HCO3 of 12 and found to have elevated uric acid. CT CAP wo IV contrast showing 3x3 cm soft tissue mass at rectum with multiple ill-defined hypodense mass at liver and diffuse sclerotic bone lesions, suspected metastatic disease. Patient underwent biopsy of rectal mass , pending biopsy results. Patient with improvement in kidney function 5/10 after IVF and Rasburicase and stable for discharge from oncologic stand point. Initial concern for TLS with elevated uric acid, prescribed allopurinol at discharge. Patient to follow up with oncology outpatient and PCP. \"    Mr. Evans presents virtually via telephone for hospital follow-up visit today. He reports feeling overall well since hospital discharge. His appetite remains decreased, but he is eating small meals frequently and drinking fluids throughout the day. States he has lost ~15 lbs over the past few weeks. He notes persistent low back pain, also shoulder pain, which is controlled with PRN Tylenol. He's been unable to use his Lido patches as lives alone, unable to apply on his back. He is having regular BM's " without bleeding. He notes feeling very anxious and worried about his biopsy results and prognosis, has little social support and is also worried as he lacks transportation to [facility].    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 His mom lives nearby in [place].    History of tobacco use:   Former cigarette smoker, quit 6/1/10.    Personal history of malignancy:   None.    PATHOLOGY:    5/7/25 Rectal mass biopsy -- surg path in process.    Flex sig report, 5/7/25:  Findings  Single malignant-appearing, fungating ulcerated mass (traversable) measuring approximately 4 cm in the distal rectum, covering one quarter of the circumference; diffuse oozing seen with minimal provocation. Concerning for malignancy. Performed cold forceps biopsy, Jar 1.   The remaining examination of the colon to the rectosigmoid was normal. Procedure stopped at that time due to patient discomfort.      IMAGING:    === 05/05/25 ===    MR ABDOMEN W AND WO CONTRAST (Preliminary)  This result has not been signed. Information might be incomplete.    - Impression -  1. Innumerable metastatic lesions involving the majority of the liver  (>30 lesions).  2. Innumerable osseous metastatic lesions involving the visualized  lumbar and thoracic vertebrae.  3. Several lesions involving the bilateral kidneys which may  represent metastatic disease, however multiple primary renal  neoplasms can not be entirely excluded which can be seen in syndromes  such as hereditary papillary renal cell carcinoma (HPRC), genetic  testing could be considered.    I personally reviewed the image(s) / study and I agree with the  findings as stated by Rhoda Santoyo MD. This study was interpreted at  [facility], [place], [place].    MACRO:  None      Dictation workstation:   [id number]      === 05/05/25 ===    CT CHEST ABDOMEN PELVIS WO CONTRAST    - Impression -  1. 3.0 x 3.0  cm soft tissue mass abutting the left side of the rectum  with perirectal adenopathy, concerning for malignancy with metastatic  disease. PET/CT can be obtained for further evaluation.  2. Hepatomegaly. Multiple ill-defined hypodense masses at the liver,  consistent with metastatic disease.  3. Diffuse sclerotic appearance of T8 vertebral body. Sclerotic  lesions at the ribs with expansile lesion at the posterior left 5th  rib. Lytic and sclerotic lesions at the spine and at the pelvis.  Findings are probably representing osseous metastatic disease.  4. Mild colonic diverticulosis.  5. Prostatomegaly.  6. Small hiatal hernia.  7. Cardiomegaly. Dilated main pulmonary artery, please correlate for  pulmonary arterial hypertension.  8. Mild right basilar atelectasis.          MACRO:    None.    Signed by: Amanda Tracy 5/5/2025 7:25 PM  Dictation workstation:   [id number]      === 05/05/25 ===    XR CHEST 1 VIEW    - Impression -  1.  No evidence of acute cardiopulmonary process. Low lung volumes.      MACRO:  None    Signed by: Chente Samuel 5/5/2025 1:45 PM  Dictation workstation:   [id number]    LABS:    5/6/25   CEA:  Carcinoembryonic AG  ug/L <0.5     PSA:  Lab Results   Component Value Date    PSA 5.47 (H) 05/06/2025       PAST MEDICAL HISTORY:  Medical History[1]    PAST SURGICAL HISTORY:  Surgical History[2]    SOCIAL HISTORY:  Social Connections: Not on File (9/17/2024)    Received from [employer]    Social Blue Security     Connectedness: 0       FAMILY HISTORY:  Family History[3]    MEDICATIONS:  Medications Ordered Prior to Encounter[4]      REVIEW OF SYSTEMS:  Review of Systems   Constitutional:  Positive for appetite change and unexpected weight change. Negative for chills and fever.        +25 lb unintentional wt loss, +decreased appetite   HENT:   Negative for trouble swallowing.    Respiratory:  Negative for cough and shortness of breath.    Gastrointestinal:  Negative for abdominal pain, constipation,  diarrhea, nausea and vomiting.   Endocrine:        Denies night sweats   Genitourinary:  Negative for difficulty urinating.    Musculoskeletal:  Positive for arthralgias and back pain. Negative for gait problem.        +LBP, upper back pain w/ shoulder pain, see HPI   Neurological:  Negative for gait problem and speech difficulty.       OBJECTIVE:  There were no vitals taken for this visit.  Deferred due to virtual telephone visit.     ASSESSMENT:  Cam Evans is a 59 y.o. year old male w/ PMH  CHF (EF 20% on 2021), iron deficiency anemia, HTN, and dyslipidemia, who presents to [facility] for hospital follow-up, whilst awaiting his rectal mass biopsy results w/ concern for primary rectal malignancy w/ osseous & hepatic metastases.    PLAN:  1. Rectal mass (Primary)  2. Lytic bone lesions on xray  3. Liver lesion  -- Awaiting final pathology from 5/7 rectal biopsy. I will call Mr Evans to review once finalized.  -- GI med onc appt scheduled w/ Dr Mazariegos on 5/22.  -- CT chest staging & MRI abd completed inpatient.  -- I will contact GI SW to assist pt w/ transportation needs. I also asked the [employer] program navigators to reach out to him to assist.  -- I offered to schedule onco-psych appt w/ his reported anxiety; he would like to wait until after established w/ oncology.  -- Lab orders placed to check renal function. He continues on allopurinol daily for TLS ppx.  -- Continue PRN Tylenol for bone pain.  -- Referral to Urology w/ elevated PSA, renal lesions.  -- All patient questions answered. I provided the patient w/ the contact information for the diagnostic clinic; advised him to call in the interim with any questions/issues.     ARTEMIO Monte.CNP  [facility]         Virtual or Telephone Consent    While technically available, the patient was unable or unwilling to consent to connect via audio/video telehealth technology; therefore, I performed this visit using a  real-time audio only connection between Cam Evans & Sherin Taylor, APRN-CNP.  Verbal consent was requested and obtained from Cam Evans on this date, 05/16/25 for a telehealth visit and the patient's location was confirmed at the time of the visit.         [1] No past medical history on file.  [2] No past surgical history on file.  [3] No family history on file.  [4]   Current Outpatient Medications on File Prior to Visit   Medication Sig Dispense Refill    acetaminophen (Tylenol) 325 mg tablet Take 3 tablets (975 mg) by mouth every 8 hours if needed for mild pain (1 - 3). 30 tablet 0    allopurinol (Zyloprim) 300 mg tablet Take 1 tablet (300 mg) by mouth once daily. 30 tablet 1    aspirin 81 mg EC tablet Take 1 tablet (81 mg) by mouth once daily.      atorvastatin (Lipitor) 40 mg tablet Take 1 tablet (40 mg) by mouth once daily.      cholecalciferol (Vitamin D-3) 125 mcg (5,000 units) tablet Take 1 tablet (125 mcg) by mouth once daily.      ferrous sulfate 325 mg (65 mg elemental) tablet Take 1 tablet (325 mg) by mouth once daily with breakfast.      lidocaine 4 % patch Place 1 patch over 12 hours on the skin once daily. Remove & discard patch within 12 hours or as directed by MD. 10 patch 3    loratadine (Claritin) 10 mg tablet Take 1 tablet (10 mg) by mouth once daily as needed.      losartan (Cozaar) 100 mg tablet Take 1 tablet (100 mg) by mouth once daily.      metoprolol succinate XL (Toprol-XL) 100 mg 24 hr tablet Take 1 tablet (100 mg) by mouth once daily.      spironolactone (Aldactone) 50 mg tablet Take 1 tablet (50 mg) by mouth once daily.       No current facility-administered medications on file prior to visit.

## 2025-05-16 NOTE — PROGRESS NOTES
Transportation Referral     Referral Source: Jeanninefaizan Taylor APRN-CNP  Multiple Rides Needed? No  First Date Transportation is needed? 5/22  Ambulation: Independently  Pick-up Address: [address] [place] [address]  Patient Phone: [phone]  Accompaniment: No  Phone Receives Text Messages? Yes  Transportation Service: Roundtrip ( p:[phone]) , Reason: Only option      Scheduled Ride(s):     Date: 5/22   Appointment: Med Onc NPV   Drop off Address: [facility] SCC: [address] [place] [address]   Time: 1:15p   Return Ride: Will Call   Confirmation: Within booking system    Pt agreeable to plan. SW will continue to follow as needed.       Pricila Sevilla LMSW

## 2025-05-20 LAB
LAB AP ASR DISCLAIMER: NORMAL
LABORATORY COMMENT REPORT: NORMAL
PATH REPORT.COMMENTS IMP SPEC: NORMAL
PATH REPORT.FINAL DX SPEC: NORMAL
PATH REPORT.GROSS SPEC: NORMAL
PATH REPORT.TOTAL CANCER: NORMAL
RESIDENT REVIEW: NORMAL

## 2025-05-22 ENCOUNTER — TELEPHONE (OUTPATIENT)
Dept: PALLIATIVE MEDICINE | Facility: CLINIC | Age: 60
End: 2025-05-22

## 2025-05-22 ENCOUNTER — APPOINTMENT (OUTPATIENT)
Dept: HEMATOLOGY/ONCOLOGY | Facility: HOSPITAL | Age: 60
End: 2025-05-22
Payer: MEDICARE

## 2025-05-22 ENCOUNTER — OFFICE VISIT (OUTPATIENT)
Dept: HEMATOLOGY/ONCOLOGY | Facility: HOSPITAL | Age: 60
End: 2025-05-22
Payer: MEDICARE

## 2025-05-22 ENCOUNTER — PHARMACY VISIT (OUTPATIENT)
Dept: PHARMACY | Facility: CLINIC | Age: 60
End: 2025-05-22
Payer: MEDICARE

## 2025-05-22 ENCOUNTER — TELEPHONE (OUTPATIENT)
Dept: HEMATOLOGY/ONCOLOGY | Facility: CLINIC | Age: 60
End: 2025-05-22
Payer: MEDICARE

## 2025-05-22 ENCOUNTER — LAB (OUTPATIENT)
Dept: LAB | Facility: HOSPITAL | Age: 60
End: 2025-05-22
Payer: MEDICARE

## 2025-05-22 VITALS
HEART RATE: 92 BPM | DIASTOLIC BLOOD PRESSURE: 67 MMHG | WEIGHT: 190.26 LBS | BODY MASS INDEX: 27.24 KG/M2 | RESPIRATION RATE: 19 BRPM | SYSTOLIC BLOOD PRESSURE: 105 MMHG | HEIGHT: 70 IN | TEMPERATURE: 98.1 F | OXYGEN SATURATION: 99 %

## 2025-05-22 DIAGNOSIS — Z98.890 HISTORY OF INFUSAPORT CENTRAL VENOUS CATHETER INSERTION: ICD-10-CM

## 2025-05-22 DIAGNOSIS — K62.89 RECTAL MASS: ICD-10-CM

## 2025-05-22 DIAGNOSIS — C7A.8 NEUROENDOCRINE CANCER: ICD-10-CM

## 2025-05-22 DIAGNOSIS — C7A.8 NEUROENDOCRINE CANCER: Primary | ICD-10-CM

## 2025-05-22 LAB
ALBUMIN SERPL BCP-MCNC: 4 G/DL (ref 3.4–5)
ALP SERPL-CCNC: 189 U/L (ref 33–120)
ALT SERPL W P-5'-P-CCNC: 11 U/L (ref 10–52)
ANION GAP SERPL CALC-SCNC: 13 MMOL/L (ref 10–20)
AST SERPL W P-5'-P-CCNC: 14 U/L (ref 9–39)
BASOPHILS # BLD AUTO: 0.04 X10*3/UL (ref 0–0.1)
BASOPHILS NFR BLD AUTO: 1.1 %
BILIRUB SERPL-MCNC: 0.5 MG/DL (ref 0–1.2)
BUN SERPL-MCNC: 16 MG/DL (ref 6–23)
CALCIUM SERPL-MCNC: 9.9 MG/DL (ref 8.6–10.3)
CHLORIDE SERPL-SCNC: 102 MMOL/L (ref 98–107)
CO2 SERPL-SCNC: 27 MMOL/L (ref 21–32)
CREAT SERPL-MCNC: 1.12 MG/DL (ref 0.5–1.3)
EGFRCR SERPLBLD CKD-EPI 2021: 76 ML/MIN/1.73M*2
EOSINOPHIL # BLD AUTO: 0.11 X10*3/UL (ref 0–0.7)
EOSINOPHIL NFR BLD AUTO: 3 %
ERYTHROCYTE [DISTWIDTH] IN BLOOD BY AUTOMATED COUNT: 15.8 % (ref 11.5–14.5)
GLUCOSE SERPL-MCNC: 84 MG/DL (ref 74–99)
HCT VFR BLD AUTO: 36.6 % (ref 41–52)
HGB BLD-MCNC: 11.2 G/DL (ref 13.5–17.5)
IMM GRANULOCYTES # BLD AUTO: 0.01 X10*3/UL (ref 0–0.7)
IMM GRANULOCYTES NFR BLD AUTO: 0.3 % (ref 0–0.9)
INR PPP: 1.3 (ref 0.9–1.1)
LYMPHOCYTES # BLD AUTO: 1.14 X10*3/UL (ref 1.2–4.8)
LYMPHOCYTES NFR BLD AUTO: 30.6 %
MCH RBC QN AUTO: 27.1 PG (ref 26–34)
MCHC RBC AUTO-ENTMCNC: 30.6 G/DL (ref 32–36)
MCV RBC AUTO: 88 FL (ref 80–100)
MONOCYTES # BLD AUTO: 0.29 X10*3/UL (ref 0.1–1)
MONOCYTES NFR BLD AUTO: 7.8 %
NEUTROPHILS # BLD AUTO: 2.13 X10*3/UL (ref 1.2–7.7)
NEUTROPHILS NFR BLD AUTO: 57.2 %
NRBC BLD-RTO: 0 /100 WBCS (ref 0–0)
PHOSPHATE SERPL-MCNC: 3.5 MG/DL (ref 2.5–4.9)
PLATELET # BLD AUTO: 417 X10*3/UL (ref 150–450)
POTASSIUM SERPL-SCNC: 4.2 MMOL/L (ref 3.5–5.3)
PROT SERPL-MCNC: 8.5 G/DL (ref 6.4–8.2)
PROTHROMBIN TIME: 14 SECONDS (ref 9.8–12.4)
RBC # BLD AUTO: 4.14 X10*6/UL (ref 4.5–5.9)
SODIUM SERPL-SCNC: 138 MMOL/L (ref 136–145)
URATE SERPL-MCNC: 4 MG/DL (ref 4–7.5)
WBC # BLD AUTO: 3.7 X10*3/UL (ref 4.4–11.3)

## 2025-05-22 PROCEDURE — RXMED WILLOW AMBULATORY MEDICATION CHARGE

## 2025-05-22 PROCEDURE — 3008F BODY MASS INDEX DOCD: CPT | Performed by: INTERNAL MEDICINE

## 2025-05-22 PROCEDURE — 99215 OFFICE O/P EST HI 40 MIN: CPT | Performed by: INTERNAL MEDICINE

## 2025-05-22 PROCEDURE — 85025 COMPLETE CBC W/AUTO DIFF WBC: CPT

## 2025-05-22 PROCEDURE — 80053 COMPREHEN METABOLIC PANEL: CPT

## 2025-05-22 PROCEDURE — 85610 PROTHROMBIN TIME: CPT

## 2025-05-22 PROCEDURE — 1036F TOBACCO NON-USER: CPT | Performed by: INTERNAL MEDICINE

## 2025-05-22 PROCEDURE — 84550 ASSAY OF BLOOD/URIC ACID: CPT | Performed by: NURSE PRACTITIONER

## 2025-05-22 PROCEDURE — 84100 ASSAY OF PHOSPHORUS: CPT | Performed by: NURSE PRACTITIONER

## 2025-05-22 RX ORDER — PALONOSETRON 0.05 MG/ML
0.25 INJECTION, SOLUTION INTRAVENOUS ONCE
OUTPATIENT
Start: 2025-07-02

## 2025-05-22 RX ORDER — PROCHLORPERAZINE MALEATE 10 MG
10 TABLET ORAL EVERY 6 HOURS PRN
OUTPATIENT
Start: 2025-06-18

## 2025-05-22 RX ORDER — PROCHLORPERAZINE EDISYLATE 5 MG/ML
10 INJECTION INTRAMUSCULAR; INTRAVENOUS EVERY 6 HOURS PRN
OUTPATIENT
Start: 2025-06-18

## 2025-05-22 RX ORDER — LORAZEPAM 2 MG/ML
1 INJECTION INTRAMUSCULAR AS NEEDED
OUTPATIENT
Start: 2025-06-18

## 2025-05-22 RX ORDER — PROCHLORPERAZINE MALEATE 10 MG
10 TABLET ORAL EVERY 6 HOURS PRN
OUTPATIENT
Start: 2025-06-04

## 2025-05-22 RX ORDER — ALBUTEROL SULFATE 0.83 MG/ML
3 SOLUTION RESPIRATORY (INHALATION) AS NEEDED
OUTPATIENT
Start: 2025-06-04

## 2025-05-22 RX ORDER — EPINEPHRINE 0.3 MG/.3ML
0.3 INJECTION SUBCUTANEOUS EVERY 5 MIN PRN
OUTPATIENT
Start: 2025-06-04

## 2025-05-22 RX ORDER — LORAZEPAM 2 MG/ML
1 INJECTION INTRAMUSCULAR AS NEEDED
OUTPATIENT
Start: 2025-06-04

## 2025-05-22 RX ORDER — ALBUTEROL SULFATE 0.83 MG/ML
3 SOLUTION RESPIRATORY (INHALATION) AS NEEDED
OUTPATIENT
Start: 2025-07-02

## 2025-05-22 RX ORDER — FAMOTIDINE 10 MG/ML
20 INJECTION, SOLUTION INTRAVENOUS ONCE AS NEEDED
OUTPATIENT
Start: 2025-07-02

## 2025-05-22 RX ORDER — PALONOSETRON 0.05 MG/ML
0.25 INJECTION, SOLUTION INTRAVENOUS ONCE
OUTPATIENT
Start: 2025-06-18

## 2025-05-22 RX ORDER — LORAZEPAM 2 MG/ML
1 INJECTION INTRAMUSCULAR AS NEEDED
OUTPATIENT
Start: 2025-07-02

## 2025-05-22 RX ORDER — PROCHLORPERAZINE EDISYLATE 5 MG/ML
10 INJECTION INTRAMUSCULAR; INTRAVENOUS EVERY 6 HOURS PRN
OUTPATIENT
Start: 2025-06-04

## 2025-05-22 RX ORDER — FAMOTIDINE 10 MG/ML
20 INJECTION, SOLUTION INTRAVENOUS ONCE AS NEEDED
OUTPATIENT
Start: 2025-07-16

## 2025-05-22 RX ORDER — DIPHENHYDRAMINE HYDROCHLORIDE 50 MG/ML
50 INJECTION, SOLUTION INTRAMUSCULAR; INTRAVENOUS AS NEEDED
OUTPATIENT
Start: 2025-06-18

## 2025-05-22 RX ORDER — ONDANSETRON 8 MG/1
8 TABLET, FILM COATED ORAL EVERY 8 HOURS PRN
Qty: 30 TABLET | Refills: 5 | Status: SHIPPED | OUTPATIENT
Start: 2025-05-22

## 2025-05-22 RX ORDER — PROCHLORPERAZINE MALEATE 10 MG
10 TABLET ORAL EVERY 6 HOURS PRN
Qty: 30 TABLET | Refills: 5 | Status: SHIPPED | OUTPATIENT
Start: 2025-05-22

## 2025-05-22 RX ORDER — DIPHENHYDRAMINE HYDROCHLORIDE 50 MG/ML
50 INJECTION, SOLUTION INTRAMUSCULAR; INTRAVENOUS AS NEEDED
OUTPATIENT
Start: 2025-07-16

## 2025-05-22 RX ORDER — FAMOTIDINE 10 MG/ML
20 INJECTION, SOLUTION INTRAVENOUS ONCE AS NEEDED
OUTPATIENT
Start: 2025-06-18

## 2025-05-22 RX ORDER — ALBUTEROL SULFATE 0.83 MG/ML
3 SOLUTION RESPIRATORY (INHALATION) AS NEEDED
OUTPATIENT
Start: 2025-07-16

## 2025-05-22 RX ORDER — FAMOTIDINE 10 MG/ML
20 INJECTION, SOLUTION INTRAVENOUS ONCE AS NEEDED
OUTPATIENT
Start: 2025-06-04

## 2025-05-22 RX ORDER — LOPERAMIDE HYDROCHLORIDE 2 MG/1
CAPSULE ORAL
Qty: 100 CAPSULE | Refills: 2 | Status: SHIPPED | OUTPATIENT
Start: 2025-05-22

## 2025-05-22 RX ORDER — PROCHLORPERAZINE MALEATE 10 MG
10 TABLET ORAL EVERY 6 HOURS PRN
OUTPATIENT
Start: 2025-07-16

## 2025-05-22 RX ORDER — ALBUTEROL SULFATE 0.83 MG/ML
3 SOLUTION RESPIRATORY (INHALATION) AS NEEDED
OUTPATIENT
Start: 2025-06-18

## 2025-05-22 RX ORDER — PROCHLORPERAZINE MALEATE 10 MG
10 TABLET ORAL EVERY 6 HOURS PRN
OUTPATIENT
Start: 2025-07-02

## 2025-05-22 RX ORDER — DIPHENHYDRAMINE HYDROCHLORIDE 50 MG/ML
50 INJECTION, SOLUTION INTRAMUSCULAR; INTRAVENOUS AS NEEDED
OUTPATIENT
Start: 2025-06-04

## 2025-05-22 RX ORDER — LORAZEPAM 2 MG/ML
1 INJECTION INTRAMUSCULAR AS NEEDED
OUTPATIENT
Start: 2025-07-16

## 2025-05-22 RX ORDER — EPINEPHRINE 0.3 MG/.3ML
0.3 INJECTION SUBCUTANEOUS EVERY 5 MIN PRN
OUTPATIENT
Start: 2025-07-16

## 2025-05-22 RX ORDER — PROCHLORPERAZINE EDISYLATE 5 MG/ML
10 INJECTION INTRAMUSCULAR; INTRAVENOUS EVERY 6 HOURS PRN
OUTPATIENT
Start: 2025-07-02

## 2025-05-22 RX ORDER — OXYCODONE HYDROCHLORIDE 5 MG/1
5 TABLET ORAL EVERY 8 HOURS PRN
Qty: 60 TABLET | Refills: 0 | Status: SHIPPED | OUTPATIENT
Start: 2025-05-22

## 2025-05-22 RX ORDER — DIPHENHYDRAMINE HYDROCHLORIDE 50 MG/ML
50 INJECTION, SOLUTION INTRAMUSCULAR; INTRAVENOUS AS NEEDED
OUTPATIENT
Start: 2025-07-02

## 2025-05-22 RX ORDER — PALONOSETRON 0.05 MG/ML
0.25 INJECTION, SOLUTION INTRAVENOUS ONCE
OUTPATIENT
Start: 2025-06-04

## 2025-05-22 RX ORDER — PALONOSETRON 0.05 MG/ML
0.25 INJECTION, SOLUTION INTRAVENOUS ONCE
OUTPATIENT
Start: 2025-07-16

## 2025-05-22 RX ORDER — EPINEPHRINE 0.3 MG/.3ML
0.3 INJECTION SUBCUTANEOUS EVERY 5 MIN PRN
OUTPATIENT
Start: 2025-07-02

## 2025-05-22 RX ORDER — PROCHLORPERAZINE EDISYLATE 5 MG/ML
10 INJECTION INTRAMUSCULAR; INTRAVENOUS EVERY 6 HOURS PRN
OUTPATIENT
Start: 2025-07-16

## 2025-05-22 RX ORDER — EPINEPHRINE 0.3 MG/.3ML
0.3 INJECTION SUBCUTANEOUS EVERY 5 MIN PRN
OUTPATIENT
Start: 2025-06-18

## 2025-05-22 ASSESSMENT — PATIENT HEALTH QUESTIONNAIRE - PHQ9
1. LITTLE INTEREST OR PLEASURE IN DOING THINGS: NOT AT ALL
2. FEELING DOWN, DEPRESSED OR HOPELESS: SEVERAL DAYS
10. IF YOU CHECKED OFF ANY PROBLEMS, HOW DIFFICULT HAVE THESE PROBLEMS MADE IT FOR YOU TO DO YOUR WORK, TAKE CARE OF THINGS AT HOME, OR GET ALONG WITH OTHER PEOPLE: VERY DIFFICULT
SUM OF ALL RESPONSES TO PHQ9 QUESTIONS 1 AND 2: 1

## 2025-05-22 ASSESSMENT — PAIN SCALES - GENERAL: PAINLEVEL_OUTOF10: 0-NO PAIN

## 2025-05-22 NOTE — TELEPHONE ENCOUNTER
Patient referred to supportive oncology/palliative care by Dr. Cartwright.  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 Phone call to patient to discuss referral and scheduling. Phone connected the disconnected x2 attempts. I will try calling back tomorrow too.

## 2025-05-22 NOTE — PROGRESS NOTES
.Patient ID: Cam Evans is a 59 y.o. male.  Referring Physician: David Arreaga MD  [id number]  Department [place] [address]  Primary Care Provider: ARTEMIO Frye-MANUEL      Chief complaint: Rectal NET    HPI  Cam Evans is a 59 y.o. male with PMHx of CHF (EF 20% on 2021), iron deficiency anemia, HTN, and dyslipidemia who has been diagnosed with rectal NET  Presenting 5/5 with generalized weakness and fatigue and loss of appetite for the past week   05/05/2025: CT CAP showed 3.0 x 3.0 cm rectal mass, perirectal adenopathy, multiple liver mets, T8 and rib mets  05/19/2025: MRI abdomen showed liver lesions, osseous mets  05/07/2025: Flexible sigmoidoscopy showed Single malignant-appearing, fungating ulcerated mass, 4 cm in the distal rectum   Surgical path showed well diff. NET, G3, Ki67 is 46.4% , negative for serotonin, and SS  Today:  He is feeling fatigue, has lower back pain, intermittent abdominal pain but no nausea or vomiting. No other symptoms.     SYSTEMIC TREATMENT RECEIVED    Subjective   Please refer to Notes above for complete History of present illness.     Review of Systems:   All 14 systems have been reviewed and were negative except for the HPI.       MEDICAL HISTORY  Medical History[1]    FAMILY HISTORY  Family History[2]    TOBACCO HISTORY  Tobacco Use: Low Risk  (5/16/2025)    Patient History     Smoking Tobacco Use: Never     Smokeless Tobacco Use: Never     Passive Exposure: Not on file       SOCIAL HISTORY  Social Connections: Not on File (9/17/2024)    Received from [employer]    Social Connections     Connectedness: 0        Outpatient Medication Profile:  Medications Ordered Prior to Encounter[3]      Performance Status:  Symptomatic; fully ambulatory     Vitals and Measurements:   There were no vitals taken for this visit.      Physical Exam:   General: Appears well and in NAD  Eyes: PERRL, EOMI, clear sclera  ENMT: mucous membranes  "moist, no apparent injury, no lesions seen  Head/Neck: Neck supple, no apparent injury, thyroid without mass or tenderness, No JVD, trachea midline,   Thorax: Patent airways, CTAB, normal breath sounds with good chest expansion, thorax symmetric  Cardiovascular: Regular, rate and rhythm, no murmurs, 2+ equal pulses of the extremities, normal S 1and S 2  Gastrointestinal: Nondistended, soft, non-tender, no rebound tenderness or guarding, no masses palpable, no organomegaly, +BS  Musculoskeletal: ROM intact, no joint swelling, normal strength  Extremities: normal extremities, no cyanosis edema, contusions or wounds, no clubbing  Neurological: alert and oriented x3, intact senses, motor, response and reflexes, normal strength  Lymphatic: No significant lymphadenopathy  Psychological: Appropriate mood and behavior  Skin: Warm and dry, no lesions, no rashes         Lab Results:  I have reviewed these laboratory results:     Lab Results   Component Value Date    WBC 11.0 05/08/2025    HGB 10.0 (L) 05/08/2025    HCT 31.2 (L) 05/08/2025    MCV 83 05/08/2025     05/08/2025         Chemistry    Lab Results   Component Value Date/Time     05/10/2025 1009    K 4.4 05/10/2025 1009     05/10/2025 1009    CO2 23 05/10/2025 1009    BUN 19 05/10/2025 1009    CREATININE 1.11 05/10/2025 1009    Lab Results   Component Value Date/Time    CALCIUM 8.7 05/10/2025 1009    ALKPHOS 175 (H) 05/05/2025 1324    AST 6 (L) 05/05/2025 1324    ALT 6 (L) 05/05/2025 1324    BILITOT 0.5 05/05/2025 1324            No results found for: \"TSH\", \"G4KJZLM\", \"C8WIDUG\", \"THYROIDPAB\"     Radiology Result:  I have reviewed the latest Imaging in PACS and the findings are noted in this note. I discussed the results of the latest imaging with the patient. All previous imaging were reviewed at the time it was completed. Full records are available in the EMR for review as well.     === 05/05/25 ===    CT CHEST ABDOMEN PELVIS WO CONTRAST    - " Impression -  1. 3.0 x 3.0 cm soft tissue mass abutting the left side of the rectum  with perirectal adenopathy, concerning for malignancy with metastatic  disease. PET/CT can be obtained for further evaluation.  2. Hepatomegaly. Multiple ill-defined hypodense masses at the liver,  consistent with metastatic disease.  3. Diffuse sclerotic appearance of T8 vertebral body. Sclerotic  lesions at the ribs with expansile lesion at the posterior left 5th  rib. Lytic and sclerotic lesions at the spine and at the pelvis.  Findings are probably representing osseous metastatic disease.  4. Mild colonic diverticulosis.  5. Prostatomegaly.  6. Small hiatal hernia.  7. Cardiomegaly. Dilated main pulmonary artery, please correlate for  pulmonary arterial hypertension.  8. Mild right basilar atelectasis.    MACRO:    None.    Signed by: Amanda Tracy 5/5/2025 7:25 PM  Dictation workstation:   [id number]    === 05/05/25 ===    MR ABDOMEN W AND WO CONTRAST    - Impression -  1. Innumerable heterogenously enhancing metastatic lesions involving  the majority of the hepatic parenchyma. Some of the metastatic  deposits appear predominantly hypervascular with central T1  hyperintense/hemorrhagic component. Findings are nonspecific but can  be seen with neuroendocrine tumors, melanoma, or renal cell  carcinoma, among other differentials.  2. Innumerable osseous metastatic deposits involving the visualized  lumbar and lower thoracic vertebrae.  3. Several T2 hypointense and T1 isointense hypoenhancing lesions in  both kidneys, which may represent metastasis in the current clinical  setting, however may represent primary renal neoplasm (e.g. Papillary  RCC).    I personally reviewed the image(s) / study and I agree with the  findings as stated by Rhoda Santoyo MD. This study was interpreted at  [facility], [place], [place].    MACRO:  None    Signed by: Tess Cartwright 5/19/2025 10:59 PM  Dictation workstation:    [id number]    Pathology Results:  I have reviewed the full pathology report recorded in the EMR. The pertinent portions indicating diagnosis are listed here in the note. for details please refer to the full report recorded in the EMR.    Assessment and Plan:     Rectal NET    Cam Evans is a 59 y.o. male with PMHx of CHF (EF 20% on 2021), iron deficiency anemia, HTN, and dyslipidemia who has been diagnosed with rectal NET  Presenting 5/5 with generalized weakness and fatigue and loss of appetite for the past week   05/05/2025: CT CAP showed 3.0 x 3.0 cm rectal mass, perirectal adenopathy, multiple liver mets, T8 and rib mets  05/19/2025: MRI abdomen showed liver lesions, osseous mets  05/07/2025: Flexible sigmoidoscopy showed Single malignant-appearing, fungating ulcerated mass, 4 cm in the distal rectum   Surgical path showed well diff. NET, G3, Ki67 is 46.4% , negative for serotonin, and SS  Today:  He is feeling fatigue, has lower back pain, intermittent abdominal pain but no nausea or vomiting. No other symptoms.     Plan:  - PET-DOTATATE  - Will decide on PET-FDG accordingly if there are no SS receptors  - [facility] NGS  - Will start mFOLFOX with his current symptoms, G3 an d high tumor burden   - Referral to cardio-oncology given his CHF history  - Will prescribe oxycodone 5 mg Q8 hrs as needed and refer to palliative oncology       DISCLAIMER:   In preparing for this visit and writing this note, I reviewed all the previous electronic medical records (labs, imaging and medical charts) of the patient available in the physician portal. Significant findings which helped in decision making are recorded  in this chart.     The plan was discussed with the patient. We gave him ample opportunities to ask questions. All questions were answered to his satisfaction and he verbalized understanding.       Carlin Cartwright M.D.   and Director of the Neuroendocrine Tumor Program  Gastrointestinal Medical  Oncology  Department of Hematology and Oncology  [facility], [place] [address]  Phone (Office): [phone]  Fax:  [phone]   Email: [email or web address]  Learn more about [facility] Comprehensive Neuroendocrine Tumor Program: Click Here  Learn more about [place] [employer]end[employer]: [email or web address]                     [1] No past medical history on file.  [2] No family history on file.  [3]   Current Outpatient Medications on File Prior to Visit   Medication Sig Dispense Refill    acetaminophen (Tylenol) 325 mg tablet Take 3 tablets (975 mg) by mouth every 8 hours if needed for mild pain (1 - 3). 30 tablet 0    allopurinol (Zyloprim) 300 mg tablet Take 1 tablet (300 mg) by mouth once daily. 30 tablet 1    aspirin 81 mg EC tablet Take 1 tablet (81 mg) by mouth once daily.      atorvastatin (Lipitor) 40 mg tablet Take 1 tablet (40 mg) by mouth once daily.      cholecalciferol (Vitamin D-3) 125 mcg (5,000 units) tablet Take 1 tablet (125 mcg) by mouth once daily.      ferrous sulfate 325 mg (65 mg elemental) tablet Take 1 tablet (325 mg) by mouth once daily with breakfast.      lidocaine 4 % patch Place 1 patch over 12 hours on the skin once daily. Remove & discard patch within 12 hours or as directed by MD. 10 patch 3    loratadine (Claritin) 10 mg tablet Take 1 tablet (10 mg) by mouth once daily as needed.      losartan (Cozaar) 100 mg tablet Take 1 tablet (100 mg) by mouth once daily.      metoprolol succinate XL (Toprol-XL) 100 mg 24 hr tablet Take 1 tablet (100 mg) by mouth once daily.      spironolactone (Aldactone) 50 mg tablet Take 1 tablet (50 mg) by mouth once daily.       No current facility-administered medications on file prior to visit.

## 2025-05-22 NOTE — TELEPHONE ENCOUNTER
I called Mr. Evans & reviewed the surg path results from his rectal mass biopsy, c/w well-differentiated neuroendocrine tumor. He reports feeling quite overwhelmed. SW arranged transportation for his GI med onc appt today; he's worried about next steps & navigating the healthcare system. Discussed SW could meet w/ him & offer assistance. I also asked the [employer] navigators to reach out to him, to offer assistance.  Sherin Taylor, APRN-CNP       FINAL DIAGNOSIS      Well differentiated L cell neuroendocrine tumor, grade 3: Colon (rectum) biopsy   Electronically signed by Lina ROACH Asa, MD PhD on 5/20/2025 at 1242 EDT

## 2025-05-23 NOTE — TELEPHONE ENCOUNTER
Phone call to patient and VM left on secure line with our department information and call back if patient is interested in discussing referral further and/or scheduling.

## 2025-05-27 ENCOUNTER — TELEPHONE (OUTPATIENT)
Dept: HEMATOLOGY/ONCOLOGY | Facility: HOSPITAL | Age: 60
End: 2025-05-27
Payer: MEDICARE

## 2025-05-27 ENCOUNTER — SOCIAL WORK (OUTPATIENT)
Dept: CASE MANAGEMENT | Facility: HOSPITAL | Age: 60
End: 2025-05-27
Payer: MEDICARE

## 2025-05-27 NOTE — PROGRESS NOTES
Transportation Referral     Referral Source: Pt  Multiple Rides Needed? No  First Date Transportation is needed? 5/29  Ambulation: Independently  Pick-up Address: [address] [place] [address]  Patient Phone: [phone]  Accompaniment: No  Phone Receives Text Messages? No, Plan: Phonecall  Transportation Service: Roundtrip ( p:[phone]) , Reason: Short notice      Scheduled Ride(s):     Date: 5/29   Appointment: PET Scan   Drop off Address: [facility] SCC: [address] [id number]   Time: 10a   Return Ride: Will Call   Confirmation: Within the booking system    Pt agreeable to plan. SW will continue to follow as needed.       Pricila Sevilla LMSW

## 2025-05-29 ENCOUNTER — HOSPITAL ENCOUNTER (OUTPATIENT)
Dept: RADIOLOGY | Facility: HOSPITAL | Age: 60
Discharge: HOME | End: 2025-05-29
Payer: MEDICARE

## 2025-05-29 ENCOUNTER — APPOINTMENT (OUTPATIENT)
Dept: RADIOLOGY | Facility: HOSPITAL | Age: 60
End: 2025-05-29
Payer: MEDICARE

## 2025-05-29 DIAGNOSIS — C7A.8 NEUROENDOCRINE CANCER: ICD-10-CM

## 2025-05-29 PROCEDURE — 78815 PET IMAGE W/CT SKULL-THIGH: CPT | Mod: PI

## 2025-06-03 ENCOUNTER — SOCIAL WORK (OUTPATIENT)
Dept: CASE MANAGEMENT | Facility: HOSPITAL | Age: 60
End: 2025-06-03
Payer: MEDICARE

## 2025-06-03 ENCOUNTER — TELEPHONE (OUTPATIENT)
Dept: ADMISSION | Facility: HOSPITAL | Age: 60
End: 2025-06-03
Payer: MEDICARE

## 2025-06-03 ENCOUNTER — TELEPHONE (OUTPATIENT)
Dept: HEMATOLOGY/ONCOLOGY | Facility: HOSPITAL | Age: 60
End: 2025-06-03
Payer: MEDICARE

## 2025-06-03 NOTE — TELEPHONE ENCOUNTER
Attempted to call Cam as well as his mother to discuss treatment tomorrow. Patient did not get his port placed, therefore treatment will need to be cancelled.

## 2025-06-03 NOTE — PROGRESS NOTES
Transportation Referral     Referral Source: Pt  Multiple Rides Needed? No  First Date Transportation is needed? 6/4  Ambulation: Independently  Pick-up Address: [address] [place] [address]  Patient Phone: [phone]  Accompaniment: No  Phone Receives Text Messages? Yes  Transportation Service: Roundtrip ( p:[phone]) , Reason: Short notice      Scheduled Ride(s):     Date: 6/4   Appointment: INF, Central line   Drop off Address: [facility] [place] SCC: [address] [id number]   Time: 8a   Return Ride: Will Call   Confirmation: Within booking system    Pt agreeable to plan. SW will continue to follow as needed.       Pricila Sevilla LMSW

## 2025-06-03 NOTE — TELEPHONE ENCOUNTER
Cam states he is having issues getting transportation arranged for tomorrow. He has left messages and is not getting return call back. He is scheduled to be in central line at 8:25 and infusion at 9am

## 2025-06-04 ENCOUNTER — APPOINTMENT (OUTPATIENT)
Dept: HEMATOLOGY/ONCOLOGY | Facility: HOSPITAL | Age: 60
End: 2025-06-04
Payer: MEDICARE

## 2025-06-04 LAB
DNA RANGE(S) EXAMINED NAR: NORMAL
GENE DIS ANL INTERP-IMP: POSITIVE
GENE DIS ASSESSED: NORMAL
GENE MUT TESTED BLD/T: 4.2 M/MB
MSI CA SPEC-IMP: NORMAL
REASON FOR STUDY: NORMAL
TEMPUS GERMLINE NOTE: NORMAL
TEMPUS LCA: NORMAL
TEMPUS PORTAL: NORMAL
TEMPUS TREATMENT IMPLICATIONS NOTE: NORMAL
TEMPUS XR RESULT 1: NORMAL

## 2025-06-06 ENCOUNTER — APPOINTMENT (OUTPATIENT)
Dept: HEMATOLOGY/ONCOLOGY | Facility: HOSPITAL | Age: 60
End: 2025-06-06
Payer: MEDICARE

## 2025-06-09 ENCOUNTER — SOCIAL WORK (OUTPATIENT)
Dept: CASE MANAGEMENT | Facility: HOSPITAL | Age: 60
End: 2025-06-09
Payer: MEDICARE

## 2025-06-09 NOTE — PROGRESS NOTES
Social Work Note    Referral Source: Pt  Meeting Location: Phone  Person(s) Present: SW  Identified Needs: Return call  Impression and Plan: SW received VM from the Pt 6/9 requesting SW call him. SW attempted to contact the Pt via phone ([phone]). SW received identified VM. SW left detailed message for reason for call. SW awaiting return call.     Update 6/9 9:30a  SW received return call from the Pt. He informed SW that his appt for 6/6 was canceled and his previously arranged transportation canceled. SW informed the Pt that the transportation was canceled at the same time as his appt. He informed SW he has a Port placement scheduled 6/20. He will need transportation for this appt. At this time, the Pt is scheduled for INF 9:30a 6/20 and port placement 6/20 at 12p. SW has tentatively scheduled transportation via round trip with pickup time 9a, time may be adjusted in the event that the Pt's INF appt is rescheduled/canceled. SW to update transportation plan if this occurs. The Pt is to call SW by end of week to confirm transportation plan.   Interventions Provided: Care Coordination, Social Support, Referral to Community Resource  Estimated Time Spent:15 min    SW will continue to follow as needed.      Pricila Sevilla LMSW

## 2025-06-17 ENCOUNTER — SOCIAL WORK (OUTPATIENT)
Dept: CASE MANAGEMENT | Facility: HOSPITAL | Age: 60
End: 2025-06-17
Payer: MEDICARE

## 2025-06-17 NOTE — PROGRESS NOTES
Social Work Note    Referral Source: Pt  Meeting Location: Phone  Person(s) Present: Pt, SW  Identified Needs: Care Coordination   Impression and Plan: The Pt contacted SW via phone to discuss his upcoming appt schedule. He informed SW his appt 6/18 has been canceled and will not need transportation. He is also under the impression that his appt 6/20 for Port placement has been canceled as well. SW advised that it is not currently canceled. Throughout interaction the Pt did not understand the schedule moving forward. SW asked [facility] Will to contact the Pt to discuss the schedule. The Pt to contact SW with upcoming transportation needs. SW to be available.   Interventions Provided: Assessment, Care Coordination, and Education  Estimated Time Spent: 30 min    SW will continue to follow as needed.        Pricila Sevilla LMSW

## 2025-06-18 ENCOUNTER — APPOINTMENT (OUTPATIENT)
Dept: HEMATOLOGY/ONCOLOGY | Facility: HOSPITAL | Age: 60
End: 2025-06-18
Payer: MEDICARE

## 2025-06-20 ENCOUNTER — HOSPITAL ENCOUNTER (OUTPATIENT)
Dept: RADIOLOGY | Facility: HOSPITAL | Age: 60
Discharge: HOME | End: 2025-06-20
Payer: MEDICARE

## 2025-06-20 ENCOUNTER — SOCIAL WORK (OUTPATIENT)
Dept: CASE MANAGEMENT | Facility: HOSPITAL | Age: 60
End: 2025-06-20
Payer: MEDICARE

## 2025-06-20 ENCOUNTER — APPOINTMENT (OUTPATIENT)
Dept: HEMATOLOGY/ONCOLOGY | Facility: HOSPITAL | Age: 60
End: 2025-06-20
Payer: MEDICARE

## 2025-06-20 VITALS
HEART RATE: 75 BPM | RESPIRATION RATE: 18 BRPM | OXYGEN SATURATION: 98 % | SYSTOLIC BLOOD PRESSURE: 111 MMHG | DIASTOLIC BLOOD PRESSURE: 81 MMHG | TEMPERATURE: 98.1 F

## 2025-06-20 DIAGNOSIS — K62.89 RECTAL MASS: ICD-10-CM

## 2025-06-20 DIAGNOSIS — C7A.8 NEUROENDOCRINE CANCER: ICD-10-CM

## 2025-06-20 PROCEDURE — 99152 MOD SED SAME PHYS/QHP 5/>YRS: CPT | Performed by: RADIOLOGY

## 2025-06-20 PROCEDURE — 7100000010 HC PHASE TWO TIME - EACH INCREMENTAL 1 MINUTE

## 2025-06-20 PROCEDURE — 77001 FLUOROGUIDE FOR VEIN DEVICE: CPT | Performed by: RADIOLOGY

## 2025-06-20 PROCEDURE — 76937 US GUIDE VASCULAR ACCESS: CPT | Performed by: RADIOLOGY

## 2025-06-20 PROCEDURE — 2720000007 HC OR 272 NO HCPCS

## 2025-06-20 PROCEDURE — 99152 MOD SED SAME PHYS/QHP 5/>YRS: CPT

## 2025-06-20 PROCEDURE — 2500000004 HC RX 250 GENERAL PHARMACY W/ HCPCS (ALT 636 FOR OP/ED): Performed by: RADIOLOGY

## 2025-06-20 PROCEDURE — 7100000009 HC PHASE TWO TIME - INITIAL BASE CHARGE

## 2025-06-20 PROCEDURE — C1788 PORT, INDWELLING, IMP: HCPCS

## 2025-06-20 PROCEDURE — C1769 GUIDE WIRE: HCPCS

## 2025-06-20 PROCEDURE — 2780000003 HC OR 278 NO HCPCS

## 2025-06-20 RX ORDER — MIDAZOLAM HYDROCHLORIDE 1 MG/ML
INJECTION INTRAMUSCULAR; INTRAVENOUS
Status: COMPLETED | OUTPATIENT
Start: 2025-06-20 | End: 2025-06-20

## 2025-06-20 RX ORDER — FENTANYL CITRATE 50 UG/ML
INJECTION, SOLUTION INTRAMUSCULAR; INTRAVENOUS
Status: COMPLETED | OUTPATIENT
Start: 2025-06-20 | End: 2025-06-20

## 2025-06-20 RX ADMIN — FENTANYL CITRATE 50 MCG: 50 INJECTION, SOLUTION INTRAMUSCULAR; INTRAVENOUS at 12:05

## 2025-06-20 RX ADMIN — MIDAZOLAM HYDROCHLORIDE 1 MG: 2 INJECTION, SOLUTION INTRAMUSCULAR; INTRAVENOUS at 12:05

## 2025-06-20 RX ADMIN — MIDAZOLAM HYDROCHLORIDE 1 MG: 2 INJECTION, SOLUTION INTRAMUSCULAR; INTRAVENOUS at 12:10

## 2025-06-20 RX ADMIN — FENTANYL CITRATE 50 MCG: 50 INJECTION, SOLUTION INTRAMUSCULAR; INTRAVENOUS at 12:15

## 2025-06-20 RX ADMIN — FENTANYL CITRATE 50 MCG: 50 INJECTION, SOLUTION INTRAMUSCULAR; INTRAVENOUS at 12:10

## 2025-06-20 RX ADMIN — MIDAZOLAM HYDROCHLORIDE 1 MG: 2 INJECTION, SOLUTION INTRAMUSCULAR; INTRAVENOUS at 12:15

## 2025-06-20 ASSESSMENT — PAIN - FUNCTIONAL ASSESSMENT
PAIN_FUNCTIONAL_ASSESSMENT: 0-10

## 2025-06-20 ASSESSMENT — PAIN SCALES - GENERAL
PAINLEVEL_OUTOF10: 0 - NO PAIN

## 2025-06-20 NOTE — PROGRESS NOTES
Transportation Referral     Referral Source: Pt  Multiple Rides Needed? No  First Date Transportation is needed? 6/23  Ambulation: Independently  Pick-up Address: [address] [place] [address]  Patient Phone: [phone]  Accompaniment: No  Phone Receives Text Messages? Yes  Transportation Service: Roundtrip ( p:[phone]) , Reason: short notice      Scheduled Ride(s):     Date: 6/23   Appointment: [facility]   Drop off Address: [facility] [place] SCC: [address] [id number]   Time: 9a   Return Ride: Will Call   Confirmation: Within booking system  Pt agreeable to plan. SW will continue to follow as needed.       Pricila Sevilla LMSW

## 2025-06-20 NOTE — PRE-PROCEDURE NOTE
Interventional Radiology Preprocedure Note    Indication for procedure: Diagnoses of Neuroendocrine cancer and Rectal mass were pertinent to this visit.    Relevant review of systems: NA    Relevant Labs:   Lab Results   Component Value Date    CREATININE 1.12 05/22/2025    EGFR 76 05/22/2025    INR 1.3 (H) 05/22/2025    PROTIME 14.0 (H) 05/22/2025       Planned Sedation/Anesthesia: Moderate    Airway assessment: normal    Directed physical examination:    AOX3    Mallampati: II (hard and soft palate, upper portion of tonsils and uvula visible)    ASA Score: ASA 3 - Patient with moderate systemic disease with functional limitations    Benefits, risks and alternatives of procedure and planned sedation have been discussed with the patient and/or their representative. All questions answered and they agree to proceed.

## 2025-06-20 NOTE — POST-PROCEDURE NOTE
Interventional Radiology Brief Postprocedure Note    Attending: Bogdan Gallo MD       Diagnosis: Mediport placement for Chemotherapy    Description of procedure:  Image guided right internal jugular approach Mediport placement     Anesthesia:  MAC Moderate    Complications: None    Estimated Blood Loss: minimal    Medications (Filter: Administrations occurring from 1151 to 1226 on 06/20/25) As of 06/20/25 1226      midazolam (Versed) injection (mg) Total dose:  3 mg      Date/Time Rate/Dose/Volume Action       06/20/25  1205 1 mg Given      1210 1 mg Given      1215 1 mg Given               fentaNYL PF (Sublimaze) injection (mcg) Total dose:  150 mcg      Date/Time Rate/Dose/Volume Action       06/20/25  1205 50 mcg Given      1210 50 mcg Given      1215 50 mcg Given                   No specimens collected      See detailed result report with images in PACS.    The patient tolerated the procedure well without incident or complication and is in stable condition.

## 2025-06-23 ENCOUNTER — TELEPHONE (OUTPATIENT)
Dept: CARDIOLOGY | Facility: HOSPITAL | Age: 60
End: 2025-06-23
Payer: MEDICARE

## 2025-06-23 ENCOUNTER — OFFICE VISIT (OUTPATIENT)
Dept: PALLIATIVE MEDICINE | Facility: HOSPITAL | Age: 60
End: 2025-06-23
Payer: MEDICARE

## 2025-06-23 ENCOUNTER — PHARMACY VISIT (OUTPATIENT)
Dept: PHARMACY | Facility: CLINIC | Age: 60
End: 2025-06-23
Payer: COMMERCIAL

## 2025-06-23 VITALS
WEIGHT: 201.28 LBS | OXYGEN SATURATION: 100 % | SYSTOLIC BLOOD PRESSURE: 126 MMHG | HEART RATE: 72 BPM | TEMPERATURE: 97 F | BODY MASS INDEX: 28.82 KG/M2 | DIASTOLIC BLOOD PRESSURE: 78 MMHG | HEIGHT: 70 IN

## 2025-06-23 DIAGNOSIS — Z51.5 PALLIATIVE CARE ENCOUNTER: ICD-10-CM

## 2025-06-23 DIAGNOSIS — G89.3 CANCER RELATED PAIN: ICD-10-CM

## 2025-06-23 DIAGNOSIS — K59.03 CONSTIPATION DUE TO OPIOID THERAPY: Primary | ICD-10-CM

## 2025-06-23 DIAGNOSIS — C7A.8 NEUROENDOCRINE CANCER: ICD-10-CM

## 2025-06-23 DIAGNOSIS — F41.8 ANXIETY ABOUT HEALTH: ICD-10-CM

## 2025-06-23 DIAGNOSIS — N17.9 ACUTE RENAL FAILURE, UNSPECIFIED ACUTE RENAL FAILURE TYPE: ICD-10-CM

## 2025-06-23 DIAGNOSIS — T40.2X5A CONSTIPATION DUE TO OPIOID THERAPY: Primary | ICD-10-CM

## 2025-06-23 PROCEDURE — RXMED WILLOW AMBULATORY MEDICATION CHARGE

## 2025-06-23 PROCEDURE — 99215 OFFICE O/P EST HI 40 MIN: CPT

## 2025-06-23 PROCEDURE — 3008F BODY MASS INDEX DOCD: CPT

## 2025-06-23 RX ORDER — POLYETHYLENE GLYCOL 3350 17 G/17G
17 POWDER, FOR SOLUTION ORAL DAILY
Qty: 1530 G | Refills: 0 | Status: SHIPPED | OUTPATIENT
Start: 2025-06-23 | End: 2025-09-21

## 2025-06-23 RX ORDER — ALLOPURINOL 300 MG/1
300 TABLET ORAL DAILY
Qty: 30 TABLET | Refills: 1 | Status: SHIPPED | OUTPATIENT
Start: 2025-06-23

## 2025-06-23 RX ORDER — OXYCODONE HYDROCHLORIDE 5 MG/1
5 TABLET ORAL EVERY 8 HOURS PRN
Qty: 60 TABLET | Refills: 0 | Status: SHIPPED | OUTPATIENT
Start: 2025-06-23 | End: 2025-06-23 | Stop reason: SDUPTHER

## 2025-06-23 RX ORDER — OXYCODONE HYDROCHLORIDE 5 MG/1
5 TABLET ORAL EVERY 8 HOURS PRN
Qty: 90 TABLET | Refills: 0 | Status: SHIPPED | OUTPATIENT
Start: 2025-06-23 | End: 2025-07-23

## 2025-06-23 ASSESSMENT — PATIENT HEALTH QUESTIONNAIRE - PHQ9
2. FEELING DOWN, DEPRESSED OR HOPELESS: NOT AT ALL
1. LITTLE INTEREST OR PLEASURE IN DOING THINGS: NOT AT ALL
SUM OF ALL RESPONSES TO PHQ9 QUESTIONS 1 AND 2: 0

## 2025-06-23 ASSESSMENT — PAIN SCALES - GENERAL: PAINLEVEL_OUTOF10: 0-NO PAIN

## 2025-06-23 NOTE — PROGRESS NOTES
SUPPORTIVE AND PALLIATIVE ONCOLOGY CONSULT - OUTPATIENT      SERVICE DATE: 6/23/2025    Medical Oncologist: Carlin Cartwright MD   Radiation Oncologist: No care team member to display  Primary Physician: Julia Pedersen  [phone]    REASON FOR CONSULT/CHIEF CONSULT COMPLAINT: pain management, other symptom control  (constipation), and Introduction to Supportive and Palliative Oncology Services    Subjective   HISTORY OF PRESENT ILLNESS: Cam Evans is a 59 y.o. male who presents with  PMHx of CHF, HTN, iron deficiency anemia, and rectal NET. Pt is slated to start mFOLFOX. He follows with Dr. Cartwright.     Pain Assessment:  Pain Score:  3  Location:  lower back/rectal  Education:  review of current pain regimen      Symptom Assessment:  Pain:somewhat- chronic back pain, exacerbated lower back/rectal pain- well-controlled  Headache: none  Dizziness:none  Lack of energy: none  Difficulty sleeping: a little- chronic, did shift work for many years  Worrying: very much  Anxiety: somewhat- reports that spending frequent time in the medical world is new to him  Depression: none  Pain in mouth/swallowing: none  Dry mouth: none  Taste changes: none  Shortness of breath: none  Lack of appetite: none   Nausea: none  Vomiting: none  Constipation: somewhat- attributes to tumor, opiates  Diarrhea: none  Sore muscles: none  Numbness or tingling in hands/feet/other: none  Weight loss: none  Other: none      Information obtained from: chart review and interview of patient  ______________________________________________________________________     Oncology History   Neuroendocrine cancer   5/22/2025 Initial Diagnosis    Neuroendocrine cancer     6/4/2025 -  Chemotherapy    mFOLFOX6 (Fluorouracil Continuous Infusion / Leucovorin / Oxaliplatin), 14 Day Cycles         Medical History[1]  Surgical History[2]  Family History[3]     SOCIAL HISTORY  Support system (mother & 2 brothers; has 3 grown kids)   Social History:  reports  that he has never smoked. He has never used smokeless tobacco. He reports that he does not drink alcohol and does not use drugs.  On disability- worked in a steel factory    Baptism and Importance of Baptism: unknown    REVIEW OF SYSTEMS  Review of systems negative unless noted in HPI.       Objective     Palliative Performance Scale % (PPS)       Current Outpatient Medications   Medication Instructions    acetaminophen (TYLENOL) 975 mg, oral, Every 8 hours PRN    allopurinol (ZYLOPRIM) 300 mg, oral, Daily    aspirin 81 mg, oral, Daily RT    atorvastatin (LIPITOR) 40 mg, oral, Daily RT    cholecalciferol (VITAMIN D-3) 125 mcg, oral, Daily RT    ferrous sulfate 325 mg (65 mg elemental) tablet 1 tablet, oral, Daily with breakfast    lidocaine 4 % patch 1 patch, transdermal, Daily, Remove & discard patch within 12 hours or as directed by MD.    loperamide (Imodium) 2 mg capsule Take 2 capsules (4 mg) by mouth with the first episode of diarrhea and 1 capsule (2 mg) by mouth with any additional episodes. Maximum 8 capsules (16 mg) per day.    loratadine (CLARITIN) 10 mg, oral, Daily PRN    losartan (COZAAR) 100 mg, oral, Daily RT    metoprolol succinate XL (TOPROL-XL) 100 mg, oral, Daily RT    ondansetron (ZOFRAN) 8 mg, oral, Every 8 hours PRN    oxyCODONE (ROXICODONE) 5 mg, oral, Every 8 hours PRN    prochlorperazine (COMPAZINE) 10 mg, oral, Every 6 hours PRN    spironolactone (ALDACTONE) 50 mg, oral, Daily RT       Allergies: RX Allergies[4]  No results found for this or any previous visit (from the past 96 hours).             PHYSICAL EXAMINATION  Vital Signs:       6/20/2025    12:20 PM 6/20/2025    12:25 PM 6/20/2025    12:35 PM 6/20/2025    12:50 PM 6/20/2025     1:05 PM 6/20/2025     1:20 PM 6/23/2025    11:21 AM   Vitals   Systolic 137 115 131 108 110 111 126   Diastolic 82 72 76 72 69 81 78   BP Location       Left arm   Heart Rate 70 72 70 73 76 75 72   Temp       36.1 °C (97 °F)   Resp 17 16 18 18 18 18   "  Height       1.771 m (5' 9.72\")   Weight (lb)       201.28   BMI       29.11 kg/m2   BSA (m2)       2.12 m2   Visit Report       Report     Vital signs reviewed     Physical Exam  Constitutional:       Appearance: Normal appearance.   HENT:      Mouth/Throat:      Mouth: Mucous membranes are moist.   Eyes:      Extraocular Movements: Extraocular movements intact.      Pupils: Pupils are equal, round, and reactive to light.   Cardiovascular:      Rate and Rhythm: Normal rate.   Pulmonary:      Effort: Pulmonary effort is normal.   Abdominal:      General: There is distension.   Musculoskeletal:         General: Normal range of motion.   Skin:     General: Skin is warm and dry.   Neurological:      Mental Status: He is alert and oriented to person, place, and time.   Psychiatric:         Mood and Affect: Mood normal.         Behavior: Behavior normal.         ASSESSMENT/PLAN    Pain  Pain is: cancer related pain and chronic  Type: visceral  Pain control: well-controlled  Home regimen:   Oxycodone 5mg q8h as needed (only taking ~3-5x/week right now to good relief)  Tylenol 975mg q8h as needed- monitor use once chemo starts  Intolerances/previously tried: n/a  Personalized pain goal: feel comfortable throughout the day    Opioid Use  Medication Management:   - OARRS report reviewed with no aberrant behavior; consistent with  prescriptions/records and patient history  - MED 0-7.5.  Overdose Risk Score 370.   This has been discussed with patient.   - We will continue to closely monitor the patient for signs of prescription misuse including UDS, OARRS review and subjective reports at each visit.  - N/a concurrent benzodiazepine use   - I am a provider who either is or has consulted and collaborated with a provider certified in Hospice and Palliative Medicine and have conducted a face-face visit and examination for this patient.  - Routine Urine Drug Screen complete next visit  - Controlled Substance Agreement complete " next visit  - Specifically discussed that controlled substance prescriptions will only be provided by our group as outlined in the completed agreement  - Prescribed naloxone - pending  - Red Flags: n/a     Nausea- denies  Decreased appetite- denies       Constipation   At risk for constipation related to opioids, malignancy,  currently constipated   Current regimen:   Start Miralax 17g daily as needed  Encouraged to stay hydrated & active during this time    Altered Mood  Acute anxiety related to health concerns   uncontrolled with home regimen  Current regimen:  Recommended daily physical activity, as tolerated  Encouraged deep breathing and engaging with the [place]  Monitor need for medication     Decreased appetite- denies    Sleeping Difficulty- stable    Supportive Interventions: following with AMANDA    Introduction to Supportive and Palliative Oncology:  Spoke with patient   Introduced the role and philosophy of Supportive and Palliative oncology in the evaluation and management of symptoms during cancer treatment  Palliative care was introduced as a service for patients with serious illness to help with symptoms, assist with goals of care conversations, navigate complex decision making, improve quality of life for patients, and provide support both patients and families.  Patient seemed to appreciate the extra layer of support.    Medical Decision Making/Goals of Care/Advance Care Planning:  Patient's current clinical condition, including diagnosis, prognosis, and management plan, and goals of care were discussed.   Life limiting disease: stage III  Family: Supportive family  Goals: symptom control and cancer directed therapy    Advance Directives  Existence of Advance Directives:No - has interest, will follow up at next visit  Decision maker: YOU is unknown, pt reports he would likely choose his brother  Code Status: Full code    Next Follow-Up Visit:  Return to clinic in 3 weeks via phone  call    Signature and billing  Thank you for allowing us to participate in the care of this patient. Recommendations will be communicated back to the consulting service by way of shared electronic medical record or face-to-face.    Medical complexity was high level due to due to complexity of problems, extensive data review, and high risk of management/treatment.  Time was spent on the following: Prep Time, Time Directly with Patient/Family/Caregiver, Documentation Time. Total time spent: 70min      DATA   Diagnostic tests and information reviewed for today's visit:  Most recent labs and imaging results, Medications     6/23/25: changes to plan indicated in bold. Continue all other regimens as listed.    Some elements copied from n/a note on n/a, the elements have been updated and all reflect current decision making from today, 6/23/2025.      Plan of Care discussed with: Provider, RN, Patient      SIGNATURE: YURI Louis    Contact information:  Supportive and Palliative Oncology  Monday-Friday 8 AM-5 PM  Phone:  [phone], press option #5, then option #1.   Or Epic Secure Chat              [1] No past medical history on file.  [2] No past surgical history on file.  [3] No family history on file.  [4] No Known Allergies

## 2025-06-25 ENCOUNTER — APPOINTMENT (OUTPATIENT)
Dept: CARDIOLOGY | Facility: HOSPITAL | Age: 60
End: 2025-06-25
Payer: MEDICARE

## 2025-06-30 ENCOUNTER — SOCIAL WORK (OUTPATIENT)
Dept: CASE MANAGEMENT | Facility: HOSPITAL | Age: 60
End: 2025-06-30
Payer: MEDICARE

## 2025-06-30 NOTE — PROGRESS NOTES
Social Work Note    Referral Source: Pt  Meeting Location: Phone  Person(s) Present: Pt, SW  Identified Needs: Care Coordination- Transportation  Impression and Plan: SW received a call from the Pt requesting transportation be arranged for 7/3 pickup at 9a. SW advised that SW is currently unable to schedule due to transitioning to [employer]. SW advised that once the new program is active, SW will arrange transportation. The Pt verbalized understanding. No other concerns identified at this time. SW to be available.  Interventions Provided: Care Coordination  Estimated Time Spent: 15 min    SW will continue to follow as needed.       Pricila Sevilla LMSW

## 2025-07-01 ENCOUNTER — SOCIAL WORK (OUTPATIENT)
Dept: CASE MANAGEMENT | Facility: HOSPITAL | Age: 60
End: 2025-07-01
Payer: MEDICARE

## 2025-07-01 NOTE — PROGRESS NOTES
Transportation Referral     Referral Source: SW  Multiple Rides Needed? No  First Date Transportation is needed? 7/2  Ambulation: Independently  Pick-up Address: [address]  Patient Phone: [phone]  Accompaniment: No  Phone Receives Text Messages? Yes  Transportation Service: [employer] ( [employer]) , Reason: Short notice, only option      Scheduled Ride(s):     Date: 7/2   Appointment: INF   Drop off Address: [facility] [place] SCC: [address] [place] [address]   Time: 9a   Return Ride: 3p, possibly Will Call     UPDATE: Ride rescheduled for 5pm pickup 7/2 Pt aware    Confirmation: Within booking system    Pt agreeable to plan. SW will continue to follow as needed.       Pricila Sevilla LMSW

## 2025-07-02 ENCOUNTER — LAB (OUTPATIENT)
Dept: HEMATOLOGY/ONCOLOGY | Facility: HOSPITAL | Age: 60
End: 2025-07-02
Payer: MEDICARE

## 2025-07-02 ENCOUNTER — OFFICE VISIT (OUTPATIENT)
Dept: HEMATOLOGY/ONCOLOGY | Facility: HOSPITAL | Age: 60
End: 2025-07-02
Payer: MEDICARE

## 2025-07-02 ENCOUNTER — INFUSION (OUTPATIENT)
Dept: HEMATOLOGY/ONCOLOGY | Facility: HOSPITAL | Age: 60
End: 2025-07-02
Payer: MEDICARE

## 2025-07-02 VITALS — BODY MASS INDEX: 30.01 KG/M2 | HEIGHT: 70 IN

## 2025-07-02 VITALS
SYSTOLIC BLOOD PRESSURE: 120 MMHG | RESPIRATION RATE: 20 BRPM | TEMPERATURE: 97.2 F | HEART RATE: 80 BPM | DIASTOLIC BLOOD PRESSURE: 75 MMHG | BODY MASS INDEX: 29.88 KG/M2 | WEIGHT: 206.57 LBS | OXYGEN SATURATION: 99 %

## 2025-07-02 DIAGNOSIS — C7A.8 NEUROENDOCRINE CANCER: ICD-10-CM

## 2025-07-02 LAB
ALBUMIN SERPL BCP-MCNC: 4.2 G/DL (ref 3.4–5)
ALP SERPL-CCNC: 113 U/L (ref 33–136)
ALT SERPL W P-5'-P-CCNC: 17 U/L (ref 10–52)
ANION GAP SERPL CALC-SCNC: 13 MMOL/L (ref 10–20)
AST SERPL W P-5'-P-CCNC: 15 U/L (ref 9–39)
BASOPHILS # BLD AUTO: 0.03 X10*3/UL (ref 0–0.1)
BASOPHILS NFR BLD AUTO: 0.6 %
BILIRUB SERPL-MCNC: 0.9 MG/DL (ref 0–1.2)
BUN SERPL-MCNC: 17 MG/DL (ref 6–23)
CALCIUM SERPL-MCNC: 9.9 MG/DL (ref 8.6–10.3)
CHLORIDE SERPL-SCNC: 102 MMOL/L (ref 98–107)
CO2 SERPL-SCNC: 26 MMOL/L (ref 21–32)
CREAT SERPL-MCNC: 0.95 MG/DL (ref 0.5–1.3)
EGFRCR SERPLBLD CKD-EPI 2021: >90 ML/MIN/1.73M*2
EOSINOPHIL # BLD AUTO: 0.11 X10*3/UL (ref 0–0.7)
EOSINOPHIL NFR BLD AUTO: 2.2 %
ERYTHROCYTE [DISTWIDTH] IN BLOOD BY AUTOMATED COUNT: 15.8 % (ref 11.5–14.5)
GLUCOSE SERPL-MCNC: 78 MG/DL (ref 74–99)
HCT VFR BLD AUTO: 31 % (ref 41–52)
HGB BLD-MCNC: 9.7 G/DL (ref 13.5–17.5)
IMM GRANULOCYTES # BLD AUTO: 0.03 X10*3/UL (ref 0–0.7)
IMM GRANULOCYTES NFR BLD AUTO: 0.6 % (ref 0–0.9)
LYMPHOCYTES # BLD AUTO: 1.19 X10*3/UL (ref 1.2–4.8)
LYMPHOCYTES NFR BLD AUTO: 24 %
MCH RBC QN AUTO: 27 PG (ref 26–34)
MCHC RBC AUTO-ENTMCNC: 31.3 G/DL (ref 32–36)
MCV RBC AUTO: 86 FL (ref 80–100)
MONOCYTES # BLD AUTO: 0.32 X10*3/UL (ref 0.1–1)
MONOCYTES NFR BLD AUTO: 6.5 %
NEUTROPHILS # BLD AUTO: 3.27 X10*3/UL (ref 1.2–7.7)
NEUTROPHILS NFR BLD AUTO: 66.1 %
NRBC BLD-RTO: 0 /100 WBCS (ref 0–0)
PLATELET # BLD AUTO: 217 X10*3/UL (ref 150–450)
POTASSIUM SERPL-SCNC: 3.8 MMOL/L (ref 3.5–5.3)
PROT SERPL-MCNC: 7.7 G/DL (ref 6.4–8.2)
RBC # BLD AUTO: 3.59 X10*6/UL (ref 4.5–5.9)
SODIUM SERPL-SCNC: 137 MMOL/L (ref 136–145)
WBC # BLD AUTO: 5 X10*3/UL (ref 4.4–11.3)

## 2025-07-02 PROCEDURE — 2500000004 HC RX 250 GENERAL PHARMACY W/ HCPCS (ALT 636 FOR OP/ED): Performed by: INTERNAL MEDICINE

## 2025-07-02 PROCEDURE — 99215 OFFICE O/P EST HI 40 MIN: CPT | Performed by: INTERNAL MEDICINE

## 2025-07-02 PROCEDURE — RXMED WILLOW AMBULATORY MEDICATION CHARGE

## 2025-07-02 PROCEDURE — 96413 CHEMO IV INFUSION 1 HR: CPT

## 2025-07-02 PROCEDURE — 85025 COMPLETE CBC W/AUTO DIFF WBC: CPT

## 2025-07-02 PROCEDURE — 1036F TOBACCO NON-USER: CPT | Performed by: INTERNAL MEDICINE

## 2025-07-02 PROCEDURE — 96416 CHEMO PROLONG INFUSE W/PUMP: CPT

## 2025-07-02 PROCEDURE — 80053 COMPREHEN METABOLIC PANEL: CPT

## 2025-07-02 PROCEDURE — 96375 TX/PRO/DX INJ NEW DRUG ADDON: CPT | Mod: INF

## 2025-07-02 PROCEDURE — 99215 OFFICE O/P EST HI 40 MIN: CPT | Mod: 25 | Performed by: INTERNAL MEDICINE

## 2025-07-02 PROCEDURE — 96415 CHEMO IV INFUSION ADDL HR: CPT

## 2025-07-02 PROCEDURE — 36591 DRAW BLOOD OFF VENOUS DEVICE: CPT

## 2025-07-02 RX ORDER — LORAZEPAM 2 MG/ML
1 INJECTION INTRAMUSCULAR AS NEEDED
Status: DISCONTINUED | OUTPATIENT
Start: 2025-07-02 | End: 2025-07-02 | Stop reason: HOSPADM

## 2025-07-02 RX ORDER — HEPARIN 100 UNIT/ML
500 SYRINGE INTRAVENOUS AS NEEDED
Status: CANCELLED | OUTPATIENT
Start: 2025-07-02

## 2025-07-02 RX ORDER — HEPARIN SODIUM,PORCINE/PF 10 UNIT/ML
50 SYRINGE (ML) INTRAVENOUS AS NEEDED
Status: CANCELLED | OUTPATIENT
Start: 2025-07-02

## 2025-07-02 RX ORDER — FAMOTIDINE 10 MG/ML
20 INJECTION, SOLUTION INTRAVENOUS ONCE AS NEEDED
Status: DISCONTINUED | OUTPATIENT
Start: 2025-07-02 | End: 2025-07-02 | Stop reason: HOSPADM

## 2025-07-02 RX ORDER — ALBUTEROL SULFATE 0.83 MG/ML
3 SOLUTION RESPIRATORY (INHALATION) AS NEEDED
Status: DISCONTINUED | OUTPATIENT
Start: 2025-07-02 | End: 2025-07-02 | Stop reason: HOSPADM

## 2025-07-02 RX ORDER — PROCHLORPERAZINE MALEATE 10 MG
10 TABLET ORAL EVERY 6 HOURS PRN
Status: DISCONTINUED | OUTPATIENT
Start: 2025-07-02 | End: 2025-07-02 | Stop reason: HOSPADM

## 2025-07-02 RX ORDER — PROCHLORPERAZINE MALEATE 10 MG
10 TABLET ORAL EVERY 6 HOURS PRN
Qty: 30 TABLET | Refills: 5 | Status: SHIPPED | OUTPATIENT
Start: 2025-07-02

## 2025-07-02 RX ORDER — LOPERAMIDE HYDROCHLORIDE 2 MG/1
CAPSULE ORAL
Qty: 100 CAPSULE | Refills: 5 | Status: SHIPPED | OUTPATIENT
Start: 2025-07-02

## 2025-07-02 RX ORDER — PROCHLORPERAZINE EDISYLATE 5 MG/ML
10 INJECTION INTRAMUSCULAR; INTRAVENOUS EVERY 6 HOURS PRN
Status: DISCONTINUED | OUTPATIENT
Start: 2025-07-02 | End: 2025-07-02 | Stop reason: HOSPADM

## 2025-07-02 RX ORDER — ONDANSETRON 8 MG/1
8 TABLET, FILM COATED ORAL EVERY 8 HOURS PRN
Qty: 30 TABLET | Refills: 5 | Status: SHIPPED | OUTPATIENT
Start: 2025-07-02

## 2025-07-02 RX ORDER — EPINEPHRINE 0.3 MG/.3ML
0.3 INJECTION SUBCUTANEOUS EVERY 5 MIN PRN
Status: DISCONTINUED | OUTPATIENT
Start: 2025-07-02 | End: 2025-07-02 | Stop reason: HOSPADM

## 2025-07-02 RX ORDER — DIPHENHYDRAMINE HYDROCHLORIDE 50 MG/ML
50 INJECTION, SOLUTION INTRAMUSCULAR; INTRAVENOUS AS NEEDED
Status: DISCONTINUED | OUTPATIENT
Start: 2025-07-02 | End: 2025-07-02 | Stop reason: HOSPADM

## 2025-07-02 RX ORDER — PALONOSETRON 0.05 MG/ML
0.25 INJECTION, SOLUTION INTRAVENOUS ONCE
Status: COMPLETED | OUTPATIENT
Start: 2025-07-02 | End: 2025-07-02

## 2025-07-02 RX ADMIN — FLUOROURACIL 4950 MG: 50 INJECTION, SOLUTION INTRAVENOUS at 15:08

## 2025-07-02 RX ADMIN — DEXAMETHASONE SODIUM PHOSPHATE 12 MG: 10 INJECTION, SOLUTION INTRAMUSCULAR; INTRAVENOUS at 12:15

## 2025-07-02 RX ADMIN — PALONOSETRON HYDROCHLORIDE 0.25 MG: 0.25 INJECTION, SOLUTION INTRAVENOUS at 12:16

## 2025-07-02 RX ADMIN — OXALIPLATIN 175 MG: 5 INJECTION, SOLUTION INTRAVENOUS at 12:57

## 2025-07-02 ASSESSMENT — PAIN SCALES - GENERAL: PAINLEVEL_OUTOF10: 0-NO PAIN

## 2025-07-02 NOTE — PROGRESS NOTES
Cam Evans completed treatment without any issues, tolerated well and is aware of follow-up appointments. No questions or concerns at this time.     Administrations This Visit       dexAMETHasone (Decadron) 12 mg in dextrose 5% 50 mL IV (Ordered as: dexAMETHasone)       Admin Date  07/02/2025 Action  New Bag Dose  12 mg Rate  204.8 mL/hr Route  intravenous Documented By  Shama Finn RN              fluorouracil (Adrucil) 4,950 mg in sodium chloride 0.9% 138 mL IV via Home Infusion       Admin Date  07/02/2025 Action  Given Dose  4,950 mg Rate  3 mL/hr Route  intravenous Documented By  Shama Finn RN              OXALIplatin (Eloxatin) 175 mg in dextrose 5% 582 mL IV       Admin Date  07/02/2025 Action  New Bag Dose  175 mg Route  intravenous Documented By  Shama Finn RN              palonosetron (Aloxi) injection 0.25 mg       Admin Date  07/02/2025 Action  Given Dose  0.25 mg Route  intravenous Documented By  Shama Finn RN

## 2025-07-02 NOTE — PROGRESS NOTES
Nursing requested patient's take-home antiemetics/antidiarrheal [facility]  Rx previously sent by provider to [facility] in patient preferred list.  Rxs canceled and re-ordered to [facility] for patient to  before or after pump disconnect appointment tomorrow.     Medications sent: ondansetron, prochlorperazine, and loperamide.     Mary Barraza, QuinnD

## 2025-07-02 NOTE — SIGNIFICANT EVENT
07/02/25 1133   Prechemo Checklist   Has the patient been in the hospital, ED, or urgent care since last date of service No   Chemo/Immuno Consent Completed and Signed Yes   Protocol/Indications Verified Yes   Confirmed to previous date/time of medication N/A   Compared to previous dose N/A   All medications are dated accurately Yes   Pregnancy Test Negative Not applicable   Parameters Met Yes   BSA/Weight-Height Verified Yes   Dose Calculations Verified (current, total, cumulative) Yes

## 2025-07-02 NOTE — PROGRESS NOTES
.Patient ID: Cam Evans is a 60 y.o. male.  Referring Physician: Carlin Cartwright MD  [id number] [id number]  Primary Care Provider: ARTEMIO Frye-MANUEL      Chief complaint: Rectal NET    HPI  Cam Evans is a 60 y.o. male with PMHx of CHF (EF 20% on 2021), iron deficiency anemia, HTN, and dyslipidemia who has been diagnosed with rectal NET  Presenting 5/5 with generalized weakness and fatigue and loss of appetite for the past week   05/05/2025: CT CAP showed 3.0 x 3.0 cm rectal mass, perirectal adenopathy, multiple liver mets, T8 and rib mets  05/19/2025: MRI abdomen showed liver lesions, osseous mets  05/07/2025: Flexible sigmoidoscopy showed Single malignant-appearing, fungating ulcerated mass, 4 cm in the distal rectum   Surgical path showed well diff. NET, G3, Ki67 is 46.4% , negative for serotonin, and SS  Today:  He is feeling fatigue, has lower back pain, intermittent abdominal pain but no nausea or vomiting. No other symptoms.     SYSTEMIC TREATMENT RECEIVED    Subjective   Please refer to Notes above for complete History of present illness.     Review of Systems:   All 14 systems have been reviewed and were negative except for the HPI.       MEDICAL HISTORY  Medical History[1]    FAMILY HISTORY  Family History[2]    TOBACCO HISTORY  Tobacco Use: Low Risk  (5/22/2025)    Patient History     Smoking Tobacco Use: Never     Smokeless Tobacco Use: Never     Passive Exposure: Not on file       SOCIAL HISTORY  Social Connections: Not on File (9/17/2024)    Received from [facility]    Social Connections     Connectedness: 0        Outpatient Medication Profile:  Medications Ordered Prior to Encounter[3]      Performance Status:  Symptomatic; fully ambulatory     Vitals and Measurements:   There were no vitals taken for this visit.      Physical Exam:   General: Appears well and in NAD  Eyes: PERRL, EOMI, clear sclera  ENMT: mucous membranes moist, no apparent injury, no lesions  "seen  Head/Neck: Neck supple, no apparent injury, thyroid without mass or tenderness, No JVD, trachea midline,   Thorax: Patent airways, CTAB, normal breath sounds with good chest expansion, thorax symmetric  Cardiovascular: Regular, rate and rhythm, no murmurs, 2+ equal pulses of the extremities, normal S 1and S 2  Gastrointestinal: Nondistended, soft, non-tender, no rebound tenderness or guarding, no masses palpable, no organomegaly, +BS  Musculoskeletal: ROM intact, no joint swelling, normal strength  Extremities: normal extremities, no cyanosis edema, contusions or wounds, no clubbing  Neurological: alert and oriented x3, intact senses, motor, response and reflexes, normal strength  Lymphatic: No significant lymphadenopathy  Psychological: Appropriate mood and behavior  Skin: Warm and dry, no lesions, no rashes         Lab Results:  I have reviewed these laboratory results:     Lab Results   Component Value Date    WBC 3.7 (L) 05/22/2025    HGB 11.2 (L) 05/22/2025    HCT 36.6 (L) 05/22/2025    MCV 88 05/22/2025     05/22/2025         Chemistry    Lab Results   Component Value Date/Time     05/22/2025 1535    K 4.2 05/22/2025 1535     05/22/2025 1535    CO2 27 05/22/2025 1535    BUN 16 05/22/2025 1535    CREATININE 1.12 05/22/2025 1535    Lab Results   Component Value Date/Time    CALCIUM 9.9 05/22/2025 1535    ALKPHOS 189 (H) 05/22/2025 1535    AST 14 05/22/2025 1535    ALT 11 05/22/2025 1535    BILITOT 0.5 05/22/2025 1535            No results found for: \"TSH\", \"C1ATPAS\", \"T8ITGBA\", \"THYROIDPAB\"     Radiology Result:  I have reviewed the latest Imaging in PACS and the findings are noted in this note. I discussed the results of the latest imaging with the patient. All previous imaging were reviewed at the time it was completed. Full records are available in the EMR for review as well.     === 05/05/25 ===    CT CHEST ABDOMEN PELVIS WO CONTRAST    - Impression -  1. 3.0 x 3.0 cm soft tissue " mass abutting the left side of the rectum  with perirectal adenopathy, concerning for malignancy with metastatic  disease. PET/CT can be obtained for further evaluation.  2. Hepatomegaly. Multiple ill-defined hypodense masses at the liver,  consistent with metastatic disease.  3. Diffuse sclerotic appearance of T8 vertebral body. Sclerotic  lesions at the ribs with expansile lesion at the posterior left 5th  rib. Lytic and sclerotic lesions at the spine and at the pelvis.  Findings are probably representing osseous metastatic disease.  4. Mild colonic diverticulosis.  5. Prostatomegaly.  6. Small hiatal hernia.  7. Cardiomegaly. Dilated main pulmonary artery, please correlate for  pulmonary arterial hypertension.  8. Mild right basilar atelectasis.    MACRO:    None.    Signed by: Amanda Tracy 5/5/2025 7:25 PM  Dictation workstation:   [id number]    === 05/05/25 ===    MR ABDOMEN W AND WO CONTRAST    - Impression -  1. Innumerable heterogenously enhancing metastatic lesions involving  the majority of the hepatic parenchyma. Some of the metastatic  deposits appear predominantly hypervascular with central T1  hyperintense/hemorrhagic component. Findings are nonspecific but can  be seen with neuroendocrine tumors, melanoma, or renal cell  carcinoma, among other differentials.  2. Innumerable osseous metastatic deposits involving the visualized  lumbar and lower thoracic vertebrae.  3. Several T2 hypointense and T1 isointense hypoenhancing lesions in  both kidneys, which may represent metastasis in the current clinical  setting, however may represent primary renal neoplasm (e.g. Papillary  RCC).    I personally reviewed the image(s) / study and I agree with the  findings as stated by Rhoda Santoyo MD. This study was interpreted at  [facility], [place], [place].    MACRO:  None    Signed by: Tess Cartwright 5/19/2025 10:59 PM  Dictation workstation:   [id number]    Pathology Results:  I have reviewed  the full pathology report recorded in the EMR. The pertinent portions indicating diagnosis are listed here in the note. for details please refer to the full report recorded in the EMR.    Assessment and Plan:     Rectal NET    Cam Evans is a 60 y.o. male with PMHx of CHF (EF 20% on 2021), iron deficiency anemia, HTN, and dyslipidemia who has been diagnosed with rectal NET  Presenting 5/5 with generalized weakness and fatigue and loss of appetite for the past week   05/05/2025: CT CAP showed 3.0 x 3.0 cm rectal mass, perirectal adenopathy, multiple liver mets, T8 and rib mets  05/19/2025: MRI abdomen showed liver lesions, osseous mets  05/07/2025: Flexible sigmoidoscopy showed Single malignant-appearing, fungating ulcerated mass, 4 cm in the distal rectum   Surgical path showed well diff. NET, G3, Ki67 is 46.4% , negative for serotonin, and SS  PET-DOTATATE showed SS avid rectal, perirectal, hepatic and osseous lesions   [facility] NGS showed BARRETT, Low TMB, PRDM1 and SF3B1 mutations, KRAS/BRAF wild  Pt will start C1 mFOLFOX with his current symptoms, G3 an d high tumor burden  Today:  He is feeling fatigue, has lower back pain, intermittent abdominal pain but no nausea or vomiting. No other symptoms.     Plan:  - Will start C1 mFOLFOX   - Referral to cardio-oncology given his CHF history  - Will prescribe oxycodone 5 mg Q8 hrs as needed and refer to palliative oncology       DISCLAIMER:   In preparing for this visit and writing this note, I reviewed all the previous electronic medical records (labs, imaging and medical charts) of the patient available in the physician portal. Significant findings which helped in decision making are recorded  in this chart.     The plan was discussed with the patient. We gave him ample opportunities to ask questions. All questions were answered to his satisfaction and he verbalized understanding.       Carlin Cartwright M.D.   and Director of the Neuroendocrine Tumor  Program  Gastrointestinal Medical Oncology  Department of Hematology and Oncology  [facility], [place] [address]  Phone (Office): [phone]  Fax:  [phone]   Email: [email or web address]  Learn more about [facility] Comprehensive Neuroendocrine Tumor Program: Click Here  Learn more about [place] [employer]end[employer]: [email or web address]                     [1] No past medical history on file.  [2] No family history on file.  [3]   Current Outpatient Medications on File Prior to Visit   Medication Sig Dispense Refill    acetaminophen (Tylenol) 325 mg tablet Take 3 tablets (975 mg) by mouth every 8 hours if needed for mild pain (1 - 3). 30 tablet 0    allopurinol (Zyloprim) 300 mg tablet Take 1 tablet (300 mg) by mouth once daily. 30 tablet 1    aspirin 81 mg EC tablet Take 1 tablet (81 mg) by mouth once daily.      atorvastatin (Lipitor) 40 mg tablet Take 1 tablet (40 mg) by mouth once daily.      cholecalciferol (Vitamin D-3) 125 mcg (5,000 units) tablet Take 1 tablet (125 mcg) by mouth once daily.      ferrous sulfate 325 mg (65 mg elemental) tablet Take 1 tablet (325 mg) by mouth once daily with breakfast.      lidocaine 4 % patch Place 1 patch over 12 hours on the skin once daily. Remove & discard patch within 12 hours or as directed by MD. 10 patch 3    loperamide (Imodium) 2 mg capsule Take 2 capsules (4 mg) by mouth with the first episode of diarrhea and 1 capsule (2 mg) by mouth with any additional episodes. Maximum 8 capsules (16 mg) per day. 100 capsule 2    loratadine (Claritin) 10 mg tablet Take 1 tablet (10 mg) by mouth once daily as needed.      losartan (Cozaar) 100 mg tablet Take 1 tablet (100 mg) by mouth once daily.      metoprolol succinate XL (Toprol-XL) 100 mg 24 hr tablet Take 1 tablet (100 mg) by mouth once daily.      ondansetron (Zofran) 8 mg tablet Take 1 tablet (8  mg) by mouth every 8 hours if needed for nausea or vomiting. 30 tablet 5    oxyCODONE (Roxicodone) 5 mg immediate release tablet Take 1 tablet (5 mg) by mouth every 8 hours if needed for severe pain (7 - 10). 90 tablet 0    polyethylene glycol (Glycolax, Miralax) 17 gram/dose powder Mix 17 g of powder and drink once daily. Mix 1 cap (17g) into 8 ounces of fluid. This is to help manage constipation. 1530 g 0    prochlorperazine (Compazine) 10 mg tablet Take 1 tablet (10 mg) by mouth every 6 hours if needed for nausea or vomiting. 30 tablet 5    spironolactone (Aldactone) 50 mg tablet Take 1 tablet (50 mg) by mouth once daily.       No current facility-administered medications on file prior to visit.

## 2025-07-03 ENCOUNTER — APPOINTMENT (OUTPATIENT)
Dept: HEMATOLOGY/ONCOLOGY | Facility: HOSPITAL | Age: 60
End: 2025-07-03
Payer: MEDICARE

## 2025-07-04 ENCOUNTER — INFUSION (OUTPATIENT)
Dept: HEMATOLOGY/ONCOLOGY | Facility: HOSPITAL | Age: 60
End: 2025-07-04
Payer: MEDICARE

## 2025-07-04 ENCOUNTER — PHARMACY VISIT (OUTPATIENT)
Dept: PHARMACY | Facility: CLINIC | Age: 60
End: 2025-07-04
Payer: MEDICARE

## 2025-07-04 VITALS
TEMPERATURE: 97.7 F | DIASTOLIC BLOOD PRESSURE: 75 MMHG | OXYGEN SATURATION: 99 % | BODY MASS INDEX: 30.04 KG/M2 | RESPIRATION RATE: 20 BRPM | SYSTOLIC BLOOD PRESSURE: 117 MMHG | WEIGHT: 206.8 LBS | HEART RATE: 72 BPM

## 2025-07-04 DIAGNOSIS — C7A.8 NEUROENDOCRINE CANCER: ICD-10-CM

## 2025-07-04 PROCEDURE — 2500000004 HC RX 250 GENERAL PHARMACY W/ HCPCS (ALT 636 FOR OP/ED): Performed by: INTERNAL MEDICINE

## 2025-07-04 PROCEDURE — 96523 IRRIG DRUG DELIVERY DEVICE: CPT

## 2025-07-04 RX ORDER — HEPARIN SODIUM,PORCINE/PF 10 UNIT/ML
50 SYRINGE (ML) INTRAVENOUS AS NEEDED
OUTPATIENT
Start: 2025-07-04

## 2025-07-04 RX ORDER — HEPARIN 100 UNIT/ML
500 SYRINGE INTRAVENOUS AS NEEDED
Status: DISCONTINUED | OUTPATIENT
Start: 2025-07-04 | End: 2025-07-04 | Stop reason: HOSPADM

## 2025-07-04 RX ORDER — HEPARIN 100 UNIT/ML
500 SYRINGE INTRAVENOUS AS NEEDED
OUTPATIENT
Start: 2025-07-04

## 2025-07-04 RX ADMIN — HEPARIN 500 UNITS: 100 SYRINGE at 10:49

## 2025-07-04 ASSESSMENT — PAIN SCALES - GENERAL: PAINLEVEL_OUTOF10: 0-NO PAIN

## 2025-07-04 NOTE — PROGRESS NOTES
Pt to clinic for chemo pump disconnect- ok to disconnect early today due to holiday schedule per Dr Cartwright. Feels great- denies any complaints or side effects so far from first chemo. Precautions and sx to report reviewed. Pt ambulated independently from clinic- says he has printed copy of upcoming appts and verbalized understanding.

## 2025-07-07 ENCOUNTER — TELEPHONE (OUTPATIENT)
Dept: PALLIATIVE MEDICINE | Facility: CLINIC | Age: 60
End: 2025-07-07
Payer: MEDICARE

## 2025-07-07 NOTE — TELEPHONE ENCOUNTER
I spoke with patient. He wanted to confirm that the zofran, compazine, and imodium were only to be taken as needed. I confirmed this. He had no further questions.

## 2025-07-14 ENCOUNTER — SOCIAL WORK (OUTPATIENT)
Dept: CASE MANAGEMENT | Facility: HOSPITAL | Age: 60
End: 2025-07-14

## 2025-07-14 ENCOUNTER — TELEMEDICINE (OUTPATIENT)
Dept: PALLIATIVE MEDICINE | Facility: HOSPITAL | Age: 60
End: 2025-07-14
Payer: MEDICARE

## 2025-07-14 DIAGNOSIS — G89.3 CANCER RELATED PAIN: ICD-10-CM

## 2025-07-14 DIAGNOSIS — C7A.8 NEUROENDOCRINE CANCER: ICD-10-CM

## 2025-07-14 DIAGNOSIS — T40.2X5A CONSTIPATION DUE TO OPIOID THERAPY: ICD-10-CM

## 2025-07-14 DIAGNOSIS — K59.03 CONSTIPATION DUE TO OPIOID THERAPY: ICD-10-CM

## 2025-07-14 DIAGNOSIS — Z51.5 PALLIATIVE CARE ENCOUNTER: Primary | ICD-10-CM

## 2025-07-14 PROCEDURE — 99214 OFFICE O/P EST MOD 30 MIN: CPT

## 2025-07-14 NOTE — PROGRESS NOTES
Transportation Referral     Referral Source: Pt  Multiple Rides Needed? No  First Date Transportation is needed? 7/16  Ambulation: Independently  Pick-up Address: [address] [facility] [address]  Patient Phone: [phone]  Accompaniment: No  Phone Receives Text Messages? Yes  Transportation Service: [employer]) , Reason: Short notice      Scheduled Ride(s):     Date: 7/16   Appointment: Med Onc/ INF   Drop off Address: [facility] SCC: [address] [place] [address]   Time: 12p   Return Ride: N/A there is no return ride scheduled at this time. Notify SW once Pt is ready for return ride.    Confirmation: Within booking system    Pt agreeable to plan. SW will continue to follow as needed.       Pricila Sevilla LMSW

## 2025-07-14 NOTE — PROGRESS NOTES
SUPPORTIVE AND PALLIATIVE ONCOLOGY CONSULT - OUTPATIENT      SERVICE DATE: 7/14/2025    Virtual or Telephone Consent    An interactive audio and video telecommunication system which permits real time communications between the patient (at the originating site) and provider (at the distant site) was utilized to provide this telehealth service.   Verbal consent was requested and obtained from Cam Evans on this date, 07/14/25 for a telehealth visit and the patient's location was confirmed at the time of the visit.      Medical Oncologist: Carlin Cartwright MD   Radiation Oncologist: No care team member to display  Primary Physician: Julia Munoz [facility]  [phone]    REASON FOR CONSULT/CHIEF CONSULT COMPLAINT: pain management, other symptom control  (constipation), and Introduction to Supportive and Palliative Oncology Services    Subjective   HISTORY OF PRESENT ILLNESS: Cam Evans is a 60 y.o. male who presents with  PMHx of CHF, HTN, iron deficiency anemia, and rectal NET. Pt is now on mFOLFOX. He follows with Dr. Cartwright.     Pain Assessment:  Pain Score:  3  Location:  lower back/rectal  Education:  review of current pain regimen      Symptom Assessment:  Pain:somewhat- chronic back pain, exacerbated lower back/rectal pain- well-controlled  Headache: none  Dizziness:none  Lack of energy: none  Difficulty sleeping: a little- chronic, did shift work for many years  Worrying: somewhat- trying to learn more about upcoming treatments, relieved that he had few symptoms after cycle 1  Anxiety: somewhat- reports that spending frequent time in the medical world is new to him  Depression: none  Pain in mouth/swallowing: none  Dry mouth: none  Taste changes: none  Shortness of breath: none  Lack of appetite: none   Nausea: none  Vomiting: none  Constipation: none- resolved  Diarrhea: none  Sore muscles: none  Numbness or tingling in hands/feet/other: none  Weight loss: none  Other: none      Information obtained  from: chart review and interview of patient  ______________________________________________________________________     Oncology History   Neuroendocrine cancer   5/22/2025 Initial Diagnosis    Neuroendocrine cancer     7/2/2025 -  Chemotherapy    mFOLFOX6 (Fluorouracil Continuous Infusion / Leucovorin / Oxaliplatin), 14 Day Cycles         Medical History[1]  Surgical History[2]  Family History[3]     SOCIAL HISTORY  Support system (mother & 2 brothers; has 3 grown kids)   Social History:  reports that he has never smoked. He has never used smokeless tobacco. He reports that he does not drink alcohol and does not use drugs.  On disability- worked in a steel factory    Christianity and Importance of Christianity: unknown    REVIEW OF SYSTEMS  Review of systems negative unless noted in HPI.       Objective     Palliative Performance Scale % (PPS)       Current Outpatient Medications   Medication Instructions    acetaminophen (TYLENOL) 975 mg, oral, Every 8 hours PRN    allopurinol (ZYLOPRIM) 300 mg, oral, Daily    aspirin 81 mg, oral, Daily RT    atorvastatin (LIPITOR) 40 mg, oral, Daily RT    cholecalciferol (VITAMIN D-3) 125 mcg, oral, Daily RT    ferrous sulfate 325 mg (65 mg elemental) tablet 1 tablet, oral, Daily with breakfast    lidocaine 4 % patch 1 patch, transdermal, Daily, Remove & discard patch within 12 hours or as directed by MD.    loperamide (Imodium) 2 mg capsule Take 2 capsules (4 mg) by mouth with the first episode of diarrhea and 1 capsule (2 mg) by mouth with any additional episodes. Maximum 8 capsules (16 mg) per day.    loratadine (CLARITIN) 10 mg, oral, Daily PRN    losartan (COZAAR) 100 mg, oral, Daily RT    metoprolol succinate XL (TOPROL-XL) 100 mg, oral, Daily RT    ondansetron (ZOFRAN) 8 mg, oral, Every 8 hours PRN    oxyCODONE (ROXICODONE) 5 mg, oral, Every 8 hours PRN    polyethylene glycol (GLYCOLAX, MIRALAX) 17 g, oral, Daily, Mix 1 cap (17g) into 8 ounces of fluid. This is to help manage  constipation.    prochlorperazine (COMPAZINE) 10 mg, oral, Every 6 hours PRN    spironolactone (ALDACTONE) 50 mg, oral, Daily RT       Allergies: RX Allergies[4]  No results found for this or any previous visit (from the past 96 hours).             PHYSICAL EXAMINATION: not performed, virtual visit  Vital Signs: not performed, virtual visit    ASSESSMENT/PLAN    Pain  Pain is: cancer related pain and chronic  Type: visceral  Pain control: well-controlled  Home regimen:   Oxycodone 5mg q8h as needed (only taking ~2-3x/week right now to good relief)  Tylenol 975mg q8h as needed- monitor use once chemo starts  Intolerances/previously tried: n/a  Personalized pain goal: feel comfortable throughout the day    Opioid Use  Medication Management:   - OARRS report reviewed with no aberrant behavior; consistent with  prescriptions/records and patient history  - MED 0-7.5.  Overdose Risk Score 370.   This has been discussed with patient.   - We will continue to closely monitor the patient for signs of prescription misuse including UDS, OARRS review and subjective reports at each visit.  - N/a concurrent benzodiazepine use   - I am a provider who either is or has consulted and collaborated with a provider certified in Hospice and Palliative Medicine and have conducted a face-face visit and examination for this patient.  - Routine Urine Drug Screen complete next visit  - Controlled Substance Agreement complete next visit  - Specifically discussed that controlled substance prescriptions will only be provided by our group as outlined in the completed agreement  - Prescribed naloxone - pending  - Red Flags: n/a     Nausea- denies  Decreased appetite- denies     Constipation   At risk for constipation related to opioids, malignancy,  currently constipated   Current regimen:   Miralax 17g daily as needed  Encouraged to stay hydrated & active during this time    Altered Mood  Acute anxiety related to health concerns   uncontrolled  with home regimen  Current regimen:  Recommended daily physical activity, as tolerated  Encouraged deep breathing and engaging with the [place]  Monitor need for medication    Sleeping Difficulty- stable    Supportive Interventions: following with LISW    Introduction to Supportive and Palliative Oncology:  Spoke with patient   Introduced the role and philosophy of Supportive and Palliative oncology in the evaluation and management of symptoms during cancer treatment  Palliative care was introduced as a service for patients with serious illness to help with symptoms, assist with goals of care conversations, navigate complex decision making, improve quality of life for patients, and provide support both patients and families.  Patient seemed to appreciate the extra layer of support.    Medical Decision Making/Goals of Care/Advance Care Planning:  Patient's current clinical condition, including diagnosis, prognosis, and management plan, and goals of care were discussed.   Life limiting disease: stage III  Family: Supportive family  Goals: symptom control and cancer directed therapy    Advance Directives  Existence of Advance Directives:No - has interest, will follow up at next visit  Decision maker: HCPOA is unknown, pt reports he would likely choose his brother  Code Status: Full code    Next Follow-Up Visit:  Return to clinic in 4-6 weeks in infusion    Signature and billing  Thank you for allowing us to participate in the care of this patient. Recommendations will be communicated back to the consulting service by way of shared electronic medical record or face-to-face.    Medical complexity was moderate level due to due to complexity of problems, extensive data review, and high risk of management/treatment.  Time was spent on the following: Prep Time, Time Directly with Patient/Family/Caregiver, Documentation Time. Total time spent: 30min      DATA   Diagnostic tests and information reviewed for today's  visit:  Most recent labs and imaging results, Medications     7/14/25: changes to plan indicated in bold. Continue all other regimens as listed.    Some elements copied from Supportive Oncology note on 6/23/25, the elements have been updated and all reflect current decision making from today, 7/14/2025.      Plan of Care discussed with: Provider, RN, Patient      SIGNATURE: YURI Louis    Contact information:  Supportive and Palliative Oncology  Monday-Friday 8 AM-5 PM  Phone:  [phone], press option #5, then option #1.   Or Epic Secure Chat                [1] No past medical history on file.  [2] No past surgical history on file.  [3] No family history on file.  [4] No Known Allergies

## 2025-07-16 ENCOUNTER — OFFICE VISIT (OUTPATIENT)
Dept: HEMATOLOGY/ONCOLOGY | Facility: HOSPITAL | Age: 60
End: 2025-07-16
Payer: MEDICARE

## 2025-07-16 ENCOUNTER — APPOINTMENT (OUTPATIENT)
Dept: HEMATOLOGY/ONCOLOGY | Facility: HOSPITAL | Age: 60
End: 2025-07-16
Payer: MEDICARE

## 2025-07-16 ENCOUNTER — LAB (OUTPATIENT)
Dept: HEMATOLOGY/ONCOLOGY | Facility: HOSPITAL | Age: 60
End: 2025-07-16
Payer: MEDICARE

## 2025-07-16 ENCOUNTER — SOCIAL WORK (OUTPATIENT)
Dept: CASE MANAGEMENT | Facility: HOSPITAL | Age: 60
End: 2025-07-16

## 2025-07-16 ENCOUNTER — INFUSION (OUTPATIENT)
Dept: HEMATOLOGY/ONCOLOGY | Facility: HOSPITAL | Age: 60
End: 2025-07-16
Payer: MEDICARE

## 2025-07-16 VITALS
SYSTOLIC BLOOD PRESSURE: 125 MMHG | WEIGHT: 206.35 LBS | TEMPERATURE: 97.5 F | DIASTOLIC BLOOD PRESSURE: 76 MMHG | RESPIRATION RATE: 18 BRPM | HEART RATE: 68 BPM | BODY MASS INDEX: 29.98 KG/M2 | OXYGEN SATURATION: 100 %

## 2025-07-16 DIAGNOSIS — C7A.8 NEUROENDOCRINE CANCER: Primary | ICD-10-CM

## 2025-07-16 DIAGNOSIS — C7A.8 NEUROENDOCRINE CANCER: ICD-10-CM

## 2025-07-16 LAB
ALBUMIN SERPL BCP-MCNC: 4.3 G/DL (ref 3.4–5)
ALP SERPL-CCNC: 108 U/L (ref 33–136)
ALT SERPL W P-5'-P-CCNC: 14 U/L (ref 10–52)
ANION GAP SERPL CALC-SCNC: 11 MMOL/L (ref 10–20)
AST SERPL W P-5'-P-CCNC: 14 U/L (ref 9–39)
BASOPHILS # BLD AUTO: 0.04 X10*3/UL (ref 0–0.1)
BASOPHILS NFR BLD AUTO: 0.7 %
BILIRUB SERPL-MCNC: 0.7 MG/DL (ref 0–1.2)
BUN SERPL-MCNC: 18 MG/DL (ref 6–23)
CALCIUM SERPL-MCNC: 9.8 MG/DL (ref 8.6–10.3)
CHLORIDE SERPL-SCNC: 104 MMOL/L (ref 98–107)
CO2 SERPL-SCNC: 28 MMOL/L (ref 21–32)
CREAT SERPL-MCNC: 1.07 MG/DL (ref 0.5–1.3)
EGFRCR SERPLBLD CKD-EPI 2021: 79 ML/MIN/1.73M*2
EOSINOPHIL # BLD AUTO: 0.12 X10*3/UL (ref 0–0.7)
EOSINOPHIL NFR BLD AUTO: 2.1 %
ERYTHROCYTE [DISTWIDTH] IN BLOOD BY AUTOMATED COUNT: 16.3 % (ref 11.5–14.5)
GLUCOSE SERPL-MCNC: 109 MG/DL (ref 74–99)
HCT VFR BLD AUTO: 31.8 % (ref 41–52)
HGB BLD-MCNC: 10.1 G/DL (ref 13.5–17.5)
IMM GRANULOCYTES # BLD AUTO: 0.03 X10*3/UL (ref 0–0.7)
IMM GRANULOCYTES NFR BLD AUTO: 0.5 % (ref 0–0.9)
LYMPHOCYTES # BLD AUTO: 1 X10*3/UL (ref 1.2–4.8)
LYMPHOCYTES NFR BLD AUTO: 17.6 %
MCH RBC QN AUTO: 27.7 PG (ref 26–34)
MCHC RBC AUTO-ENTMCNC: 31.8 G/DL (ref 32–36)
MCV RBC AUTO: 87 FL (ref 80–100)
MONOCYTES # BLD AUTO: 0.46 X10*3/UL (ref 0.1–1)
MONOCYTES NFR BLD AUTO: 8.1 %
NEUTROPHILS # BLD AUTO: 4.04 X10*3/UL (ref 1.2–7.7)
NEUTROPHILS NFR BLD AUTO: 71 %
NRBC BLD-RTO: 0 /100 WBCS (ref 0–0)
PLATELET # BLD AUTO: 239 X10*3/UL (ref 150–450)
POTASSIUM SERPL-SCNC: 4.2 MMOL/L (ref 3.5–5.3)
PROT SERPL-MCNC: 7.8 G/DL (ref 6.4–8.2)
RBC # BLD AUTO: 3.65 X10*6/UL (ref 4.5–5.9)
SODIUM SERPL-SCNC: 139 MMOL/L (ref 136–145)
WBC # BLD AUTO: 5.7 X10*3/UL (ref 4.4–11.3)

## 2025-07-16 PROCEDURE — 96416 CHEMO PROLONG INFUSE W/PUMP: CPT

## 2025-07-16 PROCEDURE — 99215 OFFICE O/P EST HI 40 MIN: CPT | Performed by: INTERNAL MEDICINE

## 2025-07-16 PROCEDURE — 36591 DRAW BLOOD OFF VENOUS DEVICE: CPT

## 2025-07-16 PROCEDURE — 85025 COMPLETE CBC W/AUTO DIFF WBC: CPT

## 2025-07-16 PROCEDURE — 96413 CHEMO IV INFUSION 1 HR: CPT

## 2025-07-16 PROCEDURE — 96375 TX/PRO/DX INJ NEW DRUG ADDON: CPT | Mod: INF

## 2025-07-16 PROCEDURE — 1036F TOBACCO NON-USER: CPT | Performed by: INTERNAL MEDICINE

## 2025-07-16 PROCEDURE — 2500000004 HC RX 250 GENERAL PHARMACY W/ HCPCS (ALT 636 FOR OP/ED): Performed by: INTERNAL MEDICINE

## 2025-07-16 PROCEDURE — 80053 COMPREHEN METABOLIC PANEL: CPT

## 2025-07-16 PROCEDURE — 96367 TX/PROPH/DG ADDL SEQ IV INF: CPT

## 2025-07-16 PROCEDURE — 99215 OFFICE O/P EST HI 40 MIN: CPT | Mod: 25 | Performed by: INTERNAL MEDICINE

## 2025-07-16 RX ORDER — HEPARIN SODIUM,PORCINE/PF 10 UNIT/ML
50 SYRINGE (ML) INTRAVENOUS AS NEEDED
Status: CANCELLED | OUTPATIENT
Start: 2025-07-16

## 2025-07-16 RX ORDER — PROCHLORPERAZINE MALEATE 10 MG
10 TABLET ORAL EVERY 6 HOURS PRN
Status: DISCONTINUED | OUTPATIENT
Start: 2025-07-16 | End: 2025-07-16 | Stop reason: HOSPADM

## 2025-07-16 RX ORDER — HEPARIN 100 UNIT/ML
500 SYRINGE INTRAVENOUS AS NEEDED
Status: CANCELLED | OUTPATIENT
Start: 2025-07-16

## 2025-07-16 RX ORDER — LORAZEPAM 2 MG/ML
1 INJECTION INTRAMUSCULAR AS NEEDED
Status: DISCONTINUED | OUTPATIENT
Start: 2025-07-16 | End: 2025-07-16 | Stop reason: HOSPADM

## 2025-07-16 RX ORDER — PROCHLORPERAZINE EDISYLATE 5 MG/ML
10 INJECTION INTRAMUSCULAR; INTRAVENOUS EVERY 6 HOURS PRN
Status: DISCONTINUED | OUTPATIENT
Start: 2025-07-16 | End: 2025-07-16 | Stop reason: HOSPADM

## 2025-07-16 RX ORDER — PALONOSETRON 0.05 MG/ML
0.25 INJECTION, SOLUTION INTRAVENOUS ONCE
Status: COMPLETED | OUTPATIENT
Start: 2025-07-16 | End: 2025-07-16

## 2025-07-16 RX ADMIN — OXALIPLATIN 175 MG: 5 INJECTION, SOLUTION, CONCENTRATE INTRAVENOUS at 15:59

## 2025-07-16 RX ADMIN — DEXAMETHASONE SODIUM PHOSPHATE 12 MG: 10 INJECTION, SOLUTION INTRAMUSCULAR; INTRAVENOUS at 15:26

## 2025-07-16 RX ADMIN — FLUOROURACIL 4950 MG: 50 INJECTION, SOLUTION INTRAVENOUS at 18:15

## 2025-07-16 RX ADMIN — PALONOSETRON HYDROCHLORIDE 0.25 MG: 0.25 INJECTION, SOLUTION INTRAVENOUS at 15:24

## 2025-07-16 ASSESSMENT — PAIN SCALES - GENERAL: PAINLEVEL_OUTOF10: 0-NO PAIN

## 2025-07-16 NOTE — PROGRESS NOTES
.Patient ID: Cam Evans is a 60 y.o. male.  Referring Physician: No referring provider defined for this encounter.  Primary Care Provider: ARTEMIO Frye-MANUEL      Chief complaint: Rectal NET    HPI  Cam Evans is a 60 y.o. male with PMHx of CHF (EF 20% on 2021), iron deficiency anemia, HTN, and dyslipidemia who has been diagnosed with rectal NET  Presenting 5/5 with generalized weakness and fatigue and loss of appetite for the past week   05/05/2025: CT CAP showed 3.0 x 3.0 cm rectal mass, perirectal adenopathy, multiple liver mets, T8 and rib mets  05/19/2025: MRI abdomen showed liver lesions, osseous mets  05/07/2025: Flexible sigmoidoscopy showed Single malignant-appearing, fungating ulcerated mass, 4 cm in the distal rectum   Surgical path showed well diff. NET, G3, Ki67 is 46.4% , negative for serotonin, and SS  Today:  He is feeling better, has lower back pain which is better, intermittent abdominal pain but no nausea or vomiting. No other symptoms.   SYSTEMIC TREATMENT RECEIVED    Subjective   Please refer to Notes above for complete History of present illness.     Review of Systems:   All 14 systems have been reviewed and were negative except for the HPI.       MEDICAL HISTORY  Medical History[1]    FAMILY HISTORY  Family History[2]    TOBACCO HISTORY  Tobacco Use: Low Risk  (7/2/2025)    Patient History     Smoking Tobacco Use: Never     Smokeless Tobacco Use: Never     Passive Exposure: Not on file       SOCIAL HISTORY  Social Connections: Not on File (9/17/2024)    Received from [facility]    Social Connections     Connectedness: 0        Outpatient Medication Profile:  Medications Ordered Prior to Encounter[3]      Performance Status:  Symptomatic; fully ambulatory     Vitals and Measurements:   There were no vitals taken for this visit.      Physical Exam:   General: Appears well and in NAD  Eyes: PERRL, EOMI, clear sclera  ENMT: mucous membranes moist, no apparent injury, no lesions  "seen  Head/Neck: Neck supple, no apparent injury, thyroid without mass or tenderness, No JVD, trachea midline,   Thorax: Patent airways, CTAB, normal breath sounds with good chest expansion, thorax symmetric  Cardiovascular: Regular, rate and rhythm, no murmurs, 2+ equal pulses of the extremities, normal S 1and S 2  Gastrointestinal: Nondistended, soft, non-tender, no rebound tenderness or guarding, no masses palpable, no organomegaly, +BS  Musculoskeletal: ROM intact, no joint swelling, normal strength  Extremities: normal extremities, no cyanosis edema, contusions or wounds, no clubbing  Neurological: alert and oriented x3, intact senses, motor, response and reflexes, normal strength  Lymphatic: No significant lymphadenopathy  Psychological: Appropriate mood and behavior  Skin: Warm and dry, no lesions, no rashes         Lab Results:  I have reviewed these laboratory results:     Lab Results   Component Value Date    WBC 5.7 07/16/2025    HGB 10.1 (L) 07/16/2025    HCT 31.8 (L) 07/16/2025    MCV 87 07/16/2025     07/16/2025         Chemistry    Lab Results   Component Value Date/Time     07/16/2025 1256    K 4.2 07/16/2025 1256     07/16/2025 1256    CO2 28 07/16/2025 1256    BUN 18 07/16/2025 1256    CREATININE 1.07 07/16/2025 1256    Lab Results   Component Value Date/Time    CALCIUM 9.8 07/16/2025 1256    ALKPHOS 108 07/16/2025 1256    AST 14 07/16/2025 1256    ALT 14 07/16/2025 1256    BILITOT 0.7 07/16/2025 1256            No results found for: \"TSH\", \"A5ANVEQ\", \"G1IPYTG\", \"THYROIDPAB\"     Radiology Result:  I have reviewed the latest Imaging in PACS and the findings are noted in this note. I discussed the results of the latest imaging with the patient. All previous imaging were reviewed at the time it was completed. Full records are available in the EMR for review as well.     === 05/05/25 ===    CT CHEST ABDOMEN PELVIS WO CONTRAST    - Impression -  1. 3.0 x 3.0 cm soft tissue mass " abutting the left side of the rectum  with perirectal adenopathy, concerning for malignancy with metastatic  disease. PET/CT can be obtained for further evaluation.  2. Hepatomegaly. Multiple ill-defined hypodense masses at the liver,  consistent with metastatic disease.  3. Diffuse sclerotic appearance of T8 vertebral body. Sclerotic  lesions at the ribs with expansile lesion at the posterior left 5th  rib. Lytic and sclerotic lesions at the spine and at the pelvis.  Findings are probably representing osseous metastatic disease.  4. Mild colonic diverticulosis.  5. Prostatomegaly.  6. Small hiatal hernia.  7. Cardiomegaly. Dilated main pulmonary artery, please correlate for  pulmonary arterial hypertension.  8. Mild right basilar atelectasis.    MACRO:    None.    Signed by: Amanda Trayc 5/5/2025 7:25 PM  Dictation workstation:   [id number]    === 05/05/25 ===    MR ABDOMEN W AND WO CONTRAST    - Impression -  1. Innumerable heterogenously enhancing metastatic lesions involving  the majority of the hepatic parenchyma. Some of the metastatic  deposits appear predominantly hypervascular with central T1  hyperintense/hemorrhagic component. Findings are nonspecific but can  be seen with neuroendocrine tumors, melanoma, or renal cell  carcinoma, among other differentials.  2. Innumerable osseous metastatic deposits involving the visualized  lumbar and lower thoracic vertebrae.  3. Several T2 hypointense and T1 isointense hypoenhancing lesions in  both kidneys, which may represent metastasis in the current clinical  setting, however may represent primary renal neoplasm (e.g. Papillary  RCC).    I personally reviewed the image(s) / study and I agree with the  findings as stated by Rhoda Santoyo MD. This study was interpreted at  [facility], [place], [place].    MACRO:  None    Signed by: Tess Cartwright 5/19/2025 10:59 PM  Dictation workstation:   [id number]    Pathology Results:  I have reviewed the  full pathology report recorded in the EMR. The pertinent portions indicating diagnosis are listed here in the note. for details please refer to the full report recorded in the EMR.    Assessment and Plan:     Rectal NET    Cam Evans is a 60 y.o. male with PMHx of CHF (EF 20% on 2021), iron deficiency anemia, HTN, and dyslipidemia who has been diagnosed with rectal NET  Presenting 5/5 with generalized weakness and fatigue and loss of appetite for the past week   05/05/2025: CT CAP showed 3.0 x 3.0 cm rectal mass, perirectal adenopathy, multiple liver mets, T8 and rib mets  05/19/2025: MRI abdomen showed liver lesions, osseous mets  05/07/2025: Flexible sigmoidoscopy showed Single malignant-appearing, fungating ulcerated mass, 4 cm in the distal rectum   Surgical path showed well diff. NET, G3, Ki67 is 46.4% , negative for serotonin, and SS  PET-DOTATATE showed SS avid rectal, perirectal, hepatic and osseous lesions   [facility] NGS showed BARRETT, Low TMB, PRDM1 and SF3B1 mutations, KRAS/BRAF wild  Pt started mFOLFOX with his current symptoms, G3 and high tumor burden  Today:  He is feeling better, has lower back pain which is better, intermittent abdominal pain but no nausea or vomiting. No other symptoms.     Plan:  - Will resume C2 mFOLFOX   - Referral to cardio-oncology given his CHF history  - Will prescribe oxycodone 5 mg Q8 hrs as needed and refer to palliative oncology      DISCLAIMER:   In preparing for this visit and writing this note, I reviewed all the previous electronic medical records (labs, imaging and medical charts) of the patient available in the physician portal. Significant findings which helped in decision making are recorded  in this chart.     The plan was discussed with the patient. We gave him ample opportunities to ask questions. All questions were answered to his satisfaction and he verbalized understanding.       Carlin Cartwright M.D.   and Director of the Neuroendocrine Tumor  Program  Gastrointestinal Medical Oncology  Department of Hematology and Oncology  [facility], [place] [address]  Phone (Office): [phone]  Fax:  [phone]   Email: [email or web address]  Learn more about [facility] Comprehensive Neuroendocrine Tumor Program: Click Here  Learn more about [place] [employer]: [email or web address]                     [1] No past medical history on file.  [2] No family history on file.  [3]   Current Outpatient Medications on File Prior to Visit   Medication Sig Dispense Refill    acetaminophen (Tylenol) 325 mg tablet Take 3 tablets (975 mg) by mouth every 8 hours if needed for mild pain (1 - 3). 30 tablet 0    allopurinol (Zyloprim) 300 mg tablet Take 1 tablet (300 mg) by mouth once daily. 30 tablet 1    aspirin 81 mg EC tablet Take 1 tablet (81 mg) by mouth once daily.      atorvastatin (Lipitor) 40 mg tablet Take 1 tablet (40 mg) by mouth once daily.      cholecalciferol (Vitamin D-3) 125 mcg (5,000 units) tablet Take 1 tablet (125 mcg) by mouth once daily.      ferrous sulfate 325 mg (65 mg elemental) tablet Take 1 tablet (325 mg) by mouth once daily with breakfast.      lidocaine 4 % patch Place 1 patch over 12 hours on the skin once daily. Remove & discard patch within 12 hours or as directed by MD. 10 patch 3    loperamide (Imodium) 2 mg capsule Take 2 capsules (4 mg) by mouth with the first episode of diarrhea and 1 capsule (2 mg) by mouth with any additional episodes. Maximum 8 capsules (16 mg) per day. 100 capsule 5    loratadine (Claritin) 10 mg tablet Take 1 tablet (10 mg) by mouth once daily as needed.      losartan (Cozaar) 100 mg tablet Take 1 tablet (100 mg) by mouth once daily.      metoprolol succinate XL (Toprol-XL) 100 mg 24 hr tablet Take 1 tablet (100 mg) by mouth once daily.      ondansetron (Zofran) 8 mg tablet Take 1 tablet (8  mg) by mouth every 8 hours if needed for nausea or vomiting. 30 tablet 5    oxyCODONE (Roxicodone) 5 mg immediate release tablet Take 1 tablet (5 mg) by mouth every 8 hours if needed for severe pain (7 - 10). 90 tablet 0    polyethylene glycol (Glycolax, Miralax) 17 gram/dose powder Mix 17 g of powder and drink once daily. Mix 1 cap (17g) into 8 ounces of fluid. This is to help manage constipation. 1530 g 0    prochlorperazine (Compazine) 10 mg tablet Take 1 tablet (10 mg) by mouth every 6 hours if needed for nausea or vomiting. 30 tablet 5    spironolactone (Aldactone) 50 mg tablet Take 1 tablet (50 mg) by mouth once daily.       No current facility-administered medications on file prior to visit.

## 2025-07-16 NOTE — PROGRESS NOTES
Transportation Referral     Referral Source: SW  Multiple Rides Needed? No  First Date Transportation is needed? 7/16  Ambulation: Independently  Pick-up Address: [facility]  Patient Phone: [phone]  Accompaniment: No  Phone Receives Text Messages? Yes  Transportation Service: [employer] ( [employer]) , Reason: Short notice      Scheduled Ride(s):     Date: 7/16   Appointment: INF   Drop off Address: [facility] [address] [place] [address]   Time: 6:45p   Return Ride: N/A- one way trip home from [facility]   Confirmation: Within booking system    Pt agreeable to plan. SW will continue to follow as needed.        Pricila Sevilla LMSW

## 2025-07-16 NOTE — PROGRESS NOTES
Cam Evans is a 60 y.o. male who presents for C2D1 FOLFOX.    He is on the following chemotherapy regimen:   Treatment Plans       Name Type Plan Dates Plan Provider         Active    mFOLFOX6 (Fluorouracil Continuous Infusion / Leucovorin / Oxaliplatin), 14 Day [facility] Oncology Treatment 5/22/2025 - Present Carlin Cartwright MD                  Pt seen by Dr Knight prior to infusion appt, see his note for further assessment details.    Patient tolerated infusions well without incident. Patient educated pump will finish around 1615pm on Friday. Patient discharged independently off unit in stable condition with home infusion pump running.

## 2025-07-17 ENCOUNTER — SOCIAL WORK (OUTPATIENT)
Dept: CASE MANAGEMENT | Facility: HOSPITAL | Age: 60
End: 2025-07-17
Payer: MEDICARE

## 2025-07-17 NOTE — PROGRESS NOTES
Transportation Referral     Referral Source: Pt  Multiple Rides Needed? No  First Date Transportation is needed? 7/18  Ambulation: Independently  Pick-up Address: [address] [facility] [address]  Patient Phone: [phone]   Accompaniment: No  Phone Receives Text Messages? Yes  Transportation Service: [employer] ( [employer]) , Reason: Short notice      Scheduled Ride(s):     Date: 7/18   Appointment: INF pump disconnect   Drop off Address: [facility]: [address] [place] [address]   Time: 3p   Return Ride: 5:30p- RN and Pt aware  Confirmation: Within booking system    Pt agreeable to plan. SW will continue to follow as needed.       Pricila Sevilla LMSW

## 2025-07-18 ENCOUNTER — INFUSION (OUTPATIENT)
Dept: HEMATOLOGY/ONCOLOGY | Facility: HOSPITAL | Age: 60
End: 2025-07-18
Payer: MEDICARE

## 2025-07-18 ENCOUNTER — APPOINTMENT (OUTPATIENT)
Dept: HEMATOLOGY/ONCOLOGY | Facility: HOSPITAL | Age: 60
End: 2025-07-18
Payer: MEDICARE

## 2025-07-18 ENCOUNTER — TELEPHONE (OUTPATIENT)
Dept: CARDIOLOGY | Facility: HOSPITAL | Age: 60
End: 2025-07-18
Payer: MEDICARE

## 2025-07-18 VITALS
HEART RATE: 73 BPM | SYSTOLIC BLOOD PRESSURE: 114 MMHG | HEIGHT: 70 IN | WEIGHT: 210.54 LBS | OXYGEN SATURATION: 99 % | TEMPERATURE: 97 F | RESPIRATION RATE: 20 BRPM | DIASTOLIC BLOOD PRESSURE: 71 MMHG | BODY MASS INDEX: 30.14 KG/M2

## 2025-07-18 DIAGNOSIS — C7A.8 NEUROENDOCRINE CANCER: ICD-10-CM

## 2025-07-18 LAB
LAB AP ASR DISCLAIMER: NORMAL
LABORATORY COMMENT REPORT: NORMAL
PATH REPORT.ADDENDUM SPEC: NORMAL
PATH REPORT.COMMENTS IMP SPEC: NORMAL
PATH REPORT.FINAL DX SPEC: NORMAL
PATH REPORT.GROSS SPEC: NORMAL
PATH REPORT.TOTAL CANCER: NORMAL
RESIDENT REVIEW: NORMAL

## 2025-07-18 PROCEDURE — 0761T DGTZ GLS MCRSCP SL IMM EA 1: CPT | Mod: TC,SUR | Performed by: STUDENT IN AN ORGANIZED HEALTH CARE EDUCATION/TRAINING PROGRAM

## 2025-07-18 PROCEDURE — 2500000004 HC RX 250 GENERAL PHARMACY W/ HCPCS (ALT 636 FOR OP/ED): Performed by: INTERNAL MEDICINE

## 2025-07-18 RX ORDER — HEPARIN SODIUM,PORCINE/PF 10 UNIT/ML
50 SYRINGE (ML) INTRAVENOUS AS NEEDED
OUTPATIENT
Start: 2025-07-18

## 2025-07-18 RX ORDER — HEPARIN 100 UNIT/ML
500 SYRINGE INTRAVENOUS AS NEEDED
Status: DISCONTINUED | OUTPATIENT
Start: 2025-07-18 | End: 2025-07-18 | Stop reason: HOSPADM

## 2025-07-18 RX ORDER — HEPARIN 100 UNIT/ML
500 SYRINGE INTRAVENOUS AS NEEDED
OUTPATIENT
Start: 2025-07-18

## 2025-07-18 RX ADMIN — HEPARIN 500 UNITS: 100 SYRINGE at 16:19

## 2025-07-18 NOTE — TELEPHONE ENCOUNTER
Confirmed patients 7-21-25 appointment with Dr. Toure and asked him to bring his past records and he stated he did not have them and didn't know how to get them from [facility] since they have closed

## 2025-07-18 NOTE — PROGRESS NOTES
Patient arrived ambulatory to infusion for scheduled tx of pump disconnect. Denies any new or worsening sx. Tolerated home infusion without issue. Bag was completley empty when RN disconnected patient. Patient made aware of upcoming appointments. Patient also has a ride set up at 1730 pre social work. He should get a text message on his phone when  has arrived.Discharged in stable condition.

## 2025-07-21 ENCOUNTER — APPOINTMENT (OUTPATIENT)
Dept: CARDIOLOGY | Facility: HOSPITAL | Age: 60
End: 2025-07-21
Payer: MEDICARE

## 2025-08-11 ENCOUNTER — SOCIAL WORK (OUTPATIENT)
Dept: CASE MANAGEMENT | Facility: HOSPITAL | Age: 60
End: 2025-08-11
Payer: MEDICARE

## 2025-08-12 ENCOUNTER — NURSE TRIAGE (OUTPATIENT)
Dept: ADMISSION | Facility: HOSPITAL | Age: 60
End: 2025-08-12
Payer: MEDICARE

## 2025-08-13 ENCOUNTER — INFUSION (OUTPATIENT)
Dept: HEMATOLOGY/ONCOLOGY | Facility: HOSPITAL | Age: 60
End: 2025-08-13
Payer: MEDICARE

## 2025-08-13 ENCOUNTER — LAB (OUTPATIENT)
Dept: HEMATOLOGY/ONCOLOGY | Facility: HOSPITAL | Age: 60
End: 2025-08-13
Payer: MEDICARE

## 2025-08-13 ENCOUNTER — OFFICE VISIT (OUTPATIENT)
Dept: HEMATOLOGY/ONCOLOGY | Facility: HOSPITAL | Age: 60
End: 2025-08-13
Payer: MEDICARE

## 2025-08-13 ENCOUNTER — OFFICE VISIT (OUTPATIENT)
Dept: PALLIATIVE MEDICINE | Facility: HOSPITAL | Age: 60
End: 2025-08-13
Payer: MEDICARE

## 2025-08-13 VITALS
TEMPERATURE: 97 F | OXYGEN SATURATION: 96 % | HEART RATE: 65 BPM | BODY MASS INDEX: 30.52 KG/M2 | RESPIRATION RATE: 18 BRPM | DIASTOLIC BLOOD PRESSURE: 62 MMHG | SYSTOLIC BLOOD PRESSURE: 105 MMHG | WEIGHT: 210.1 LBS

## 2025-08-13 DIAGNOSIS — R63.0 DECREASED APPETITE: ICD-10-CM

## 2025-08-13 DIAGNOSIS — K59.03 CONSTIPATION DUE TO OPIOID THERAPY: ICD-10-CM

## 2025-08-13 DIAGNOSIS — G89.3 CANCER RELATED PAIN: ICD-10-CM

## 2025-08-13 DIAGNOSIS — C7A.8 NEUROENDOCRINE CANCER: ICD-10-CM

## 2025-08-13 DIAGNOSIS — Z51.5 PALLIATIVE CARE ENCOUNTER: ICD-10-CM

## 2025-08-13 DIAGNOSIS — Z79.891 ENCOUNTER FOR LONG-TERM OPIATE ANALGESIC USE: Primary | ICD-10-CM

## 2025-08-13 DIAGNOSIS — T40.2X5A CONSTIPATION DUE TO OPIOID THERAPY: ICD-10-CM

## 2025-08-13 DIAGNOSIS — C7A.8 NEUROENDOCRINE CANCER: Primary | ICD-10-CM

## 2025-08-13 DIAGNOSIS — Z79.891 ENCOUNTER FOR LONG-TERM OPIATE ANALGESIC USE: ICD-10-CM

## 2025-08-13 LAB
ALBUMIN SERPL BCP-MCNC: 4.4 G/DL (ref 3.4–5)
ALP SERPL-CCNC: 106 U/L (ref 33–136)
ALT SERPL W P-5'-P-CCNC: 22 U/L (ref 10–52)
AMPHETAMINES UR QL SCN: ABNORMAL
ANION GAP SERPL CALC-SCNC: 11 MMOL/L (ref 10–20)
AST SERPL W P-5'-P-CCNC: 21 U/L (ref 9–39)
BARBITURATES UR QL SCN: ABNORMAL
BASOPHILS # BLD AUTO: 0.04 X10*3/UL (ref 0–0.1)
BASOPHILS NFR BLD AUTO: 0.9 %
BENZODIAZ UR QL SCN: ABNORMAL
BILIRUB SERPL-MCNC: 0.6 MG/DL (ref 0–1.2)
BUN SERPL-MCNC: 20 MG/DL (ref 6–23)
BZE UR QL SCN: ABNORMAL
CALCIUM SERPL-MCNC: 9.6 MG/DL (ref 8.6–10.3)
CANNABINOIDS UR QL SCN: ABNORMAL
CHLORIDE SERPL-SCNC: 104 MMOL/L (ref 98–107)
CO2 SERPL-SCNC: 28 MMOL/L (ref 21–32)
CREAT SERPL-MCNC: 1.36 MG/DL (ref 0.5–1.3)
EGFRCR SERPLBLD CKD-EPI 2021: 60 ML/MIN/1.73M*2
EOSINOPHIL # BLD AUTO: 0.16 X10*3/UL (ref 0–0.7)
EOSINOPHIL NFR BLD AUTO: 3.6 %
ERYTHROCYTE [DISTWIDTH] IN BLOOD BY AUTOMATED COUNT: 16.1 % (ref 11.5–14.5)
FENTANYL+NORFENTANYL UR QL SCN: ABNORMAL
GLUCOSE SERPL-MCNC: 100 MG/DL (ref 74–99)
HCT VFR BLD AUTO: 33.1 % (ref 41–52)
HGB BLD-MCNC: 10.3 G/DL (ref 13.5–17.5)
IMM GRANULOCYTES # BLD AUTO: 0.02 X10*3/UL (ref 0–0.7)
IMM GRANULOCYTES NFR BLD AUTO: 0.5 % (ref 0–0.9)
LYMPHOCYTES # BLD AUTO: 1.06 X10*3/UL (ref 1.2–4.8)
LYMPHOCYTES NFR BLD AUTO: 23.9 %
MCH RBC QN AUTO: 27.6 PG (ref 26–34)
MCHC RBC AUTO-ENTMCNC: 31.1 G/DL (ref 32–36)
MCV RBC AUTO: 89 FL (ref 80–100)
METHADONE UR QL SCN: ABNORMAL
MONOCYTES # BLD AUTO: 0.45 X10*3/UL (ref 0.1–1)
MONOCYTES NFR BLD AUTO: 10.1 %
NEUTROPHILS # BLD AUTO: 2.71 X10*3/UL (ref 1.2–7.7)
NEUTROPHILS NFR BLD AUTO: 61 %
NRBC BLD-RTO: 0 /100 WBCS (ref 0–0)
OPIATES UR QL SCN: ABNORMAL
OXYCODONE+OXYMORPHONE UR QL SCN: ABNORMAL
PCP UR QL SCN: ABNORMAL
PLATELET # BLD AUTO: 216 X10*3/UL (ref 150–450)
POTASSIUM SERPL-SCNC: 4.7 MMOL/L (ref 3.5–5.3)
PROT SERPL-MCNC: 7.8 G/DL (ref 6.4–8.2)
RBC # BLD AUTO: 3.73 X10*6/UL (ref 4.5–5.9)
SODIUM SERPL-SCNC: 138 MMOL/L (ref 136–145)
WBC # BLD AUTO: 4.4 X10*3/UL (ref 4.4–11.3)

## 2025-08-13 PROCEDURE — 99215 OFFICE O/P EST HI 40 MIN: CPT | Performed by: INTERNAL MEDICINE

## 2025-08-13 PROCEDURE — 96416 CHEMO PROLONG INFUSE W/PUMP: CPT

## 2025-08-13 PROCEDURE — 36591 DRAW BLOOD OFF VENOUS DEVICE: CPT

## 2025-08-13 PROCEDURE — 80053 COMPREHEN METABOLIC PANEL: CPT

## 2025-08-13 PROCEDURE — 85025 COMPLETE CBC W/AUTO DIFF WBC: CPT

## 2025-08-13 PROCEDURE — 80365 DRUG SCREENING OXYCODONE: CPT

## 2025-08-13 PROCEDURE — 80307 DRUG TEST PRSMV CHEM ANLYZR: CPT

## 2025-08-13 PROCEDURE — 96415 CHEMO IV INFUSION ADDL HR: CPT

## 2025-08-13 PROCEDURE — 96375 TX/PRO/DX INJ NEW DRUG ADDON: CPT | Mod: INF

## 2025-08-13 PROCEDURE — 2500000004 HC RX 250 GENERAL PHARMACY W/ HCPCS (ALT 636 FOR OP/ED): Performed by: INTERNAL MEDICINE

## 2025-08-13 PROCEDURE — 96413 CHEMO IV INFUSION 1 HR: CPT

## 2025-08-13 PROCEDURE — 96361 HYDRATE IV INFUSION ADD-ON: CPT | Mod: INF

## 2025-08-13 PROCEDURE — 99215 OFFICE O/P EST HI 40 MIN: CPT | Mod: 25 | Performed by: INTERNAL MEDICINE

## 2025-08-13 PROCEDURE — 99214 OFFICE O/P EST MOD 30 MIN: CPT | Mod: 25

## 2025-08-13 PROCEDURE — 99214 OFFICE O/P EST MOD 30 MIN: CPT

## 2025-08-13 RX ORDER — ALBUTEROL SULFATE 0.83 MG/ML
3 SOLUTION RESPIRATORY (INHALATION) AS NEEDED
Status: DISCONTINUED | OUTPATIENT
Start: 2025-08-13 | End: 2025-08-13 | Stop reason: HOSPADM

## 2025-08-13 RX ORDER — DIPHENHYDRAMINE HYDROCHLORIDE 50 MG/ML
50 INJECTION, SOLUTION INTRAMUSCULAR; INTRAVENOUS AS NEEDED
Status: DISCONTINUED | OUTPATIENT
Start: 2025-08-13 | End: 2025-08-13 | Stop reason: HOSPADM

## 2025-08-13 RX ORDER — PROCHLORPERAZINE MALEATE 10 MG
10 TABLET ORAL EVERY 6 HOURS PRN
Status: DISCONTINUED | OUTPATIENT
Start: 2025-08-13 | End: 2025-08-13 | Stop reason: HOSPADM

## 2025-08-13 RX ORDER — LORAZEPAM 2 MG/ML
1 INJECTION INTRAMUSCULAR AS NEEDED
Status: DISCONTINUED | OUTPATIENT
Start: 2025-08-13 | End: 2025-08-13 | Stop reason: HOSPADM

## 2025-08-13 RX ORDER — EPINEPHRINE 0.3 MG/.3ML
0.3 INJECTION SUBCUTANEOUS EVERY 5 MIN PRN
Status: DISCONTINUED | OUTPATIENT
Start: 2025-08-13 | End: 2025-08-13 | Stop reason: HOSPADM

## 2025-08-13 RX ORDER — FAMOTIDINE 10 MG/ML
20 INJECTION, SOLUTION INTRAVENOUS ONCE AS NEEDED
Status: DISCONTINUED | OUTPATIENT
Start: 2025-08-13 | End: 2025-08-13 | Stop reason: HOSPADM

## 2025-08-13 RX ORDER — HEPARIN SODIUM,PORCINE/PF 10 UNIT/ML
50 SYRINGE (ML) INTRAVENOUS AS NEEDED
Status: CANCELLED | OUTPATIENT
Start: 2025-08-13

## 2025-08-13 RX ORDER — HEPARIN 100 UNIT/ML
500 SYRINGE INTRAVENOUS AS NEEDED
Status: CANCELLED | OUTPATIENT
Start: 2025-08-13

## 2025-08-13 RX ORDER — PROCHLORPERAZINE EDISYLATE 5 MG/ML
10 INJECTION INTRAMUSCULAR; INTRAVENOUS EVERY 6 HOURS PRN
Status: DISCONTINUED | OUTPATIENT
Start: 2025-08-13 | End: 2025-08-13 | Stop reason: HOSPADM

## 2025-08-13 RX ORDER — SODIUM CHLORIDE 9 MG/ML
1000 INJECTION, SOLUTION INTRAVENOUS ONCE
Status: COMPLETED | OUTPATIENT
Start: 2025-08-13 | End: 2025-08-13

## 2025-08-13 RX ORDER — PALONOSETRON 0.05 MG/ML
0.25 INJECTION, SOLUTION INTRAVENOUS ONCE
Status: COMPLETED | OUTPATIENT
Start: 2025-08-13 | End: 2025-08-13

## 2025-08-13 RX ORDER — SODIUM CHLORIDE 9 MG/ML
1000 INJECTION, SOLUTION INTRAVENOUS ONCE
Status: CANCELLED | OUTPATIENT
Start: 2025-08-13 | End: 2025-08-13

## 2025-08-13 RX ADMIN — PALONOSETRON HYDROCHLORIDE 0.25 MG: 0.25 INJECTION INTRAVENOUS at 14:51

## 2025-08-13 RX ADMIN — OXALIPLATIN 175 MG: 5 INJECTION, SOLUTION INTRAVENOUS at 15:46

## 2025-08-13 RX ADMIN — FLUOROURACIL 4950 MG: 50 INJECTION, SOLUTION INTRAVENOUS at 18:05

## 2025-08-13 RX ADMIN — SODIUM CHLORIDE 1000 ML/HR: 0.9 INJECTION, SOLUTION INTRAVENOUS at 14:40

## 2025-08-13 RX ADMIN — DEXAMETHASONE SODIUM PHOSPHATE 12 MG: 10 INJECTION, SOLUTION INTRAMUSCULAR; INTRAVENOUS at 14:51

## 2025-08-13 ASSESSMENT — PAIN SCALES - GENERAL: PAINLEVEL_OUTOF10: 0-NO PAIN

## 2025-08-14 ENCOUNTER — SOCIAL WORK (OUTPATIENT)
Dept: CASE MANAGEMENT | Facility: HOSPITAL | Age: 60
End: 2025-08-14
Payer: MEDICARE

## 2025-08-15 ENCOUNTER — INFUSION (OUTPATIENT)
Dept: HEMATOLOGY/ONCOLOGY | Facility: HOSPITAL | Age: 60
End: 2025-08-15
Payer: MEDICARE

## 2025-08-15 VITALS
BODY MASS INDEX: 31 KG/M2 | SYSTOLIC BLOOD PRESSURE: 134 MMHG | OXYGEN SATURATION: 98 % | TEMPERATURE: 98.2 F | WEIGHT: 213.41 LBS | RESPIRATION RATE: 18 BRPM | DIASTOLIC BLOOD PRESSURE: 68 MMHG | HEART RATE: 72 BPM

## 2025-08-15 DIAGNOSIS — C7A.8 NEUROENDOCRINE CANCER: ICD-10-CM

## 2025-08-15 LAB
6MAM UR CFM-MCNC: <25 NG/ML
CODEINE UR CFM-MCNC: <50 NG/ML
HYDROCODONE CTO UR CFM-MCNC: <25 NG/ML
HYDROMORPHONE UR CFM-MCNC: <25 NG/ML
MORPHINE UR CFM-MCNC: <50 NG/ML
NORHYDROCODONE UR CFM-MCNC: <25 NG/ML
NOROXYCODONE UR CFM-MCNC: >1000 NG/ML
OXYCODONE UR CFM-MCNC: 1375 NG/ML
OXYMORPHONE UR CFM-MCNC: 1180 NG/ML

## 2025-08-15 PROCEDURE — 96523 IRRIG DRUG DELIVERY DEVICE: CPT

## 2025-08-15 PROCEDURE — 2500000004 HC RX 250 GENERAL PHARMACY W/ HCPCS (ALT 636 FOR OP/ED): Performed by: INTERNAL MEDICINE

## 2025-08-15 RX ORDER — HEPARIN SODIUM,PORCINE/PF 10 UNIT/ML
50 SYRINGE (ML) INTRAVENOUS AS NEEDED
OUTPATIENT
Start: 2025-08-15

## 2025-08-15 RX ORDER — HEPARIN 100 UNIT/ML
500 SYRINGE INTRAVENOUS AS NEEDED
OUTPATIENT
Start: 2025-08-15

## 2025-08-15 RX ORDER — HEPARIN 100 UNIT/ML
500 SYRINGE INTRAVENOUS AS NEEDED
Status: DISCONTINUED | OUTPATIENT
Start: 2025-08-15 | End: 2025-08-15 | Stop reason: HOSPADM

## 2025-08-15 RX ADMIN — HEPARIN 500 UNITS: 100 SYRINGE at 16:17

## 2025-08-23 LAB — SCAN RESULT: NORMAL

## 2025-08-25 ENCOUNTER — SOCIAL WORK (OUTPATIENT)
Dept: CASE MANAGEMENT | Facility: HOSPITAL | Age: 60
End: 2025-08-25
Payer: MEDICARE

## 2025-08-27 ENCOUNTER — SOCIAL WORK (OUTPATIENT)
Dept: CASE MANAGEMENT | Facility: HOSPITAL | Age: 60
End: 2025-08-27

## 2025-08-27 ENCOUNTER — OFFICE VISIT (OUTPATIENT)
Dept: HEMATOLOGY/ONCOLOGY | Facility: HOSPITAL | Age: 60
End: 2025-08-27
Payer: MEDICARE

## 2025-08-27 ENCOUNTER — LAB (OUTPATIENT)
Dept: HEMATOLOGY/ONCOLOGY | Facility: HOSPITAL | Age: 60
End: 2025-08-27
Payer: MEDICARE

## 2025-08-27 ENCOUNTER — INFUSION (OUTPATIENT)
Dept: HEMATOLOGY/ONCOLOGY | Facility: HOSPITAL | Age: 60
End: 2025-08-27
Payer: MEDICARE

## 2025-08-27 VITALS
OXYGEN SATURATION: 100 % | SYSTOLIC BLOOD PRESSURE: 110 MMHG | RESPIRATION RATE: 20 BRPM | HEART RATE: 65 BPM | TEMPERATURE: 97.7 F | BODY MASS INDEX: 29.72 KG/M2 | DIASTOLIC BLOOD PRESSURE: 66 MMHG | WEIGHT: 204.59 LBS

## 2025-08-27 DIAGNOSIS — C7A.8 NEUROENDOCRINE CANCER: ICD-10-CM

## 2025-08-27 DIAGNOSIS — C7A.8 NEUROENDOCRINE CANCER: Primary | ICD-10-CM

## 2025-08-27 LAB
ALBUMIN SERPL BCP-MCNC: 4.3 G/DL (ref 3.4–5)
ALP SERPL-CCNC: 106 U/L (ref 33–136)
ALT SERPL W P-5'-P-CCNC: 11 U/L (ref 10–52)
ANION GAP SERPL CALC-SCNC: 11 MMOL/L (ref 10–20)
AST SERPL W P-5'-P-CCNC: 11 U/L (ref 9–39)
BASOPHILS # BLD AUTO: 0.03 X10*3/UL (ref 0–0.1)
BASOPHILS NFR BLD AUTO: 0.6 %
BILIRUB SERPL-MCNC: 0.6 MG/DL (ref 0–1.2)
BUN SERPL-MCNC: 16 MG/DL (ref 6–23)
CALCIUM SERPL-MCNC: 9.9 MG/DL (ref 8.6–10.3)
CHLORIDE SERPL-SCNC: 101 MMOL/L (ref 98–107)
CO2 SERPL-SCNC: 27 MMOL/L (ref 21–32)
CREAT SERPL-MCNC: 1.2 MG/DL (ref 0.5–1.3)
EGFRCR SERPLBLD CKD-EPI 2021: 69 ML/MIN/1.73M*2
EOSINOPHIL # BLD AUTO: 0.13 X10*3/UL (ref 0–0.7)
EOSINOPHIL NFR BLD AUTO: 2.5 %
ERYTHROCYTE [DISTWIDTH] IN BLOOD BY AUTOMATED COUNT: 15.1 % (ref 11.5–14.5)
GLUCOSE SERPL-MCNC: 95 MG/DL (ref 74–99)
HCT VFR BLD AUTO: 31.5 % (ref 41–52)
HGB BLD-MCNC: 10 G/DL (ref 13.5–17.5)
IMM GRANULOCYTES # BLD AUTO: 0.03 X10*3/UL (ref 0–0.7)
IMM GRANULOCYTES NFR BLD AUTO: 0.6 % (ref 0–0.9)
LYMPHOCYTES # BLD AUTO: 1.04 X10*3/UL (ref 1.2–4.8)
LYMPHOCYTES NFR BLD AUTO: 19.8 %
MCH RBC QN AUTO: 27.3 PG (ref 26–34)
MCHC RBC AUTO-ENTMCNC: 31.7 G/DL (ref 32–36)
MCV RBC AUTO: 86 FL (ref 80–100)
MONOCYTES # BLD AUTO: 0.41 X10*3/UL (ref 0.1–1)
MONOCYTES NFR BLD AUTO: 7.8 %
NEUTROPHILS # BLD AUTO: 3.62 X10*3/UL (ref 1.2–7.7)
NEUTROPHILS NFR BLD AUTO: 68.7 %
NRBC BLD-RTO: 0 /100 WBCS (ref 0–0)
PLATELET # BLD AUTO: 291 X10*3/UL (ref 150–450)
POTASSIUM SERPL-SCNC: 4.4 MMOL/L (ref 3.5–5.3)
PROT SERPL-MCNC: 7.9 G/DL (ref 6.4–8.2)
RBC # BLD AUTO: 3.66 X10*6/UL (ref 4.5–5.9)
SODIUM SERPL-SCNC: 135 MMOL/L (ref 136–145)
WBC # BLD AUTO: 5.3 X10*3/UL (ref 4.4–11.3)

## 2025-08-27 PROCEDURE — 85025 COMPLETE CBC W/AUTO DIFF WBC: CPT

## 2025-08-27 PROCEDURE — 2500000004 HC RX 250 GENERAL PHARMACY W/ HCPCS (ALT 636 FOR OP/ED): Performed by: INTERNAL MEDICINE

## 2025-08-27 PROCEDURE — 96415 CHEMO IV INFUSION ADDL HR: CPT

## 2025-08-27 PROCEDURE — 96375 TX/PRO/DX INJ NEW DRUG ADDON: CPT | Mod: INF

## 2025-08-27 PROCEDURE — 99215 OFFICE O/P EST HI 40 MIN: CPT | Mod: 25 | Performed by: INTERNAL MEDICINE

## 2025-08-27 PROCEDURE — 96413 CHEMO IV INFUSION 1 HR: CPT

## 2025-08-27 PROCEDURE — 96416 CHEMO PROLONG INFUSE W/PUMP: CPT

## 2025-08-27 PROCEDURE — 96367 TX/PROPH/DG ADDL SEQ IV INF: CPT

## 2025-08-27 PROCEDURE — 99215 OFFICE O/P EST HI 40 MIN: CPT | Performed by: INTERNAL MEDICINE

## 2025-08-27 PROCEDURE — 1036F TOBACCO NON-USER: CPT | Performed by: INTERNAL MEDICINE

## 2025-08-27 PROCEDURE — 36591 DRAW BLOOD OFF VENOUS DEVICE: CPT

## 2025-08-27 PROCEDURE — 80053 COMPREHEN METABOLIC PANEL: CPT

## 2025-08-27 RX ORDER — PALONOSETRON 0.05 MG/ML
0.25 INJECTION, SOLUTION INTRAVENOUS ONCE
OUTPATIENT
Start: 2025-09-10

## 2025-08-27 RX ORDER — DIPHENHYDRAMINE HYDROCHLORIDE 50 MG/ML
50 INJECTION, SOLUTION INTRAMUSCULAR; INTRAVENOUS AS NEEDED
Status: DISCONTINUED | OUTPATIENT
Start: 2025-08-27 | End: 2025-08-27 | Stop reason: HOSPADM

## 2025-08-27 RX ORDER — ALBUTEROL SULFATE 0.83 MG/ML
3 SOLUTION RESPIRATORY (INHALATION) AS NEEDED
OUTPATIENT
Start: 2025-09-10

## 2025-08-27 RX ORDER — EPINEPHRINE 0.3 MG/.3ML
0.3 INJECTION SUBCUTANEOUS EVERY 5 MIN PRN
OUTPATIENT
Start: 2025-09-10

## 2025-08-27 RX ORDER — ALBUTEROL SULFATE 0.83 MG/ML
3 SOLUTION RESPIRATORY (INHALATION) AS NEEDED
Status: DISCONTINUED | OUTPATIENT
Start: 2025-08-27 | End: 2025-08-27 | Stop reason: HOSPADM

## 2025-08-27 RX ORDER — EPINEPHRINE 0.3 MG/.3ML
0.3 INJECTION SUBCUTANEOUS EVERY 5 MIN PRN
Status: DISCONTINUED | OUTPATIENT
Start: 2025-08-27 | End: 2025-08-27 | Stop reason: HOSPADM

## 2025-08-27 RX ORDER — DIPHENHYDRAMINE HYDROCHLORIDE 50 MG/ML
50 INJECTION, SOLUTION INTRAMUSCULAR; INTRAVENOUS AS NEEDED
OUTPATIENT
Start: 2025-09-10

## 2025-08-27 RX ORDER — FAMOTIDINE 10 MG/ML
20 INJECTION, SOLUTION INTRAVENOUS ONCE AS NEEDED
OUTPATIENT
Start: 2025-09-10

## 2025-08-27 RX ORDER — PROCHLORPERAZINE MALEATE 10 MG
10 TABLET ORAL EVERY 6 HOURS PRN
Status: DISCONTINUED | OUTPATIENT
Start: 2025-08-27 | End: 2025-08-27 | Stop reason: HOSPADM

## 2025-08-27 RX ORDER — HEPARIN SODIUM,PORCINE/PF 10 UNIT/ML
50 SYRINGE (ML) INTRAVENOUS AS NEEDED
OUTPATIENT
Start: 2025-08-27

## 2025-08-27 RX ORDER — PROCHLORPERAZINE EDISYLATE 5 MG/ML
10 INJECTION INTRAMUSCULAR; INTRAVENOUS EVERY 6 HOURS PRN
OUTPATIENT
Start: 2025-09-10

## 2025-08-27 RX ORDER — PROCHLORPERAZINE EDISYLATE 5 MG/ML
10 INJECTION INTRAMUSCULAR; INTRAVENOUS EVERY 6 HOURS PRN
Status: DISCONTINUED | OUTPATIENT
Start: 2025-08-27 | End: 2025-08-27 | Stop reason: HOSPADM

## 2025-08-27 RX ORDER — LORAZEPAM 2 MG/ML
1 INJECTION INTRAMUSCULAR AS NEEDED
Status: DISCONTINUED | OUTPATIENT
Start: 2025-08-27 | End: 2025-08-27 | Stop reason: HOSPADM

## 2025-08-27 RX ORDER — PROCHLORPERAZINE MALEATE 10 MG
10 TABLET ORAL EVERY 6 HOURS PRN
OUTPATIENT
Start: 2025-09-10

## 2025-08-27 RX ORDER — HEPARIN 100 UNIT/ML
500 SYRINGE INTRAVENOUS AS NEEDED
Status: CANCELLED | OUTPATIENT
Start: 2025-08-27

## 2025-08-27 RX ORDER — FAMOTIDINE 10 MG/ML
20 INJECTION, SOLUTION INTRAVENOUS ONCE AS NEEDED
Status: DISCONTINUED | OUTPATIENT
Start: 2025-08-27 | End: 2025-08-27 | Stop reason: HOSPADM

## 2025-08-27 RX ORDER — LORAZEPAM 2 MG/ML
1 INJECTION INTRAMUSCULAR AS NEEDED
OUTPATIENT
Start: 2025-09-10

## 2025-08-27 RX ORDER — HEPARIN 100 UNIT/ML
500 SYRINGE INTRAVENOUS AS NEEDED
Status: DISCONTINUED | OUTPATIENT
Start: 2025-08-27 | End: 2025-08-27 | Stop reason: HOSPADM

## 2025-08-27 RX ORDER — PALONOSETRON 0.05 MG/ML
0.25 INJECTION, SOLUTION INTRAVENOUS ONCE
Status: COMPLETED | OUTPATIENT
Start: 2025-08-27 | End: 2025-08-27

## 2025-08-27 RX ADMIN — FLUOROURACIL 4950 MG: 50 INJECTION, SOLUTION INTRAVENOUS at 15:46

## 2025-08-27 RX ADMIN — PALONOSETRON HYDROCHLORIDE 0.25 MG: 0.25 INJECTION INTRAVENOUS at 13:07

## 2025-08-27 RX ADMIN — HEPARIN 500 UNITS: 100 SYRINGE at 11:21

## 2025-08-27 RX ADMIN — OXALIPLATIN 175 MG: 5 INJECTION, SOLUTION INTRAVENOUS at 13:37

## 2025-08-27 RX ADMIN — DEXAMETHASONE SODIUM PHOSPHATE 12 MG: 10 INJECTION, SOLUTION INTRAMUSCULAR; INTRAVENOUS at 13:07

## 2025-08-27 ASSESSMENT — PAIN SCALES - GENERAL: PAINLEVEL_OUTOF10: 0-NO PAIN

## 2025-08-29 ENCOUNTER — INFUSION (OUTPATIENT)
Dept: HEMATOLOGY/ONCOLOGY | Facility: HOSPITAL | Age: 60
End: 2025-08-29
Payer: MEDICARE

## 2025-08-29 VITALS
HEIGHT: 70 IN | OXYGEN SATURATION: 99 % | DIASTOLIC BLOOD PRESSURE: 72 MMHG | SYSTOLIC BLOOD PRESSURE: 116 MMHG | WEIGHT: 208.78 LBS | BODY MASS INDEX: 29.89 KG/M2 | TEMPERATURE: 98.2 F | HEART RATE: 87 BPM | RESPIRATION RATE: 18 BRPM

## 2025-08-29 DIAGNOSIS — C7A.8 NEUROENDOCRINE CANCER: ICD-10-CM

## 2025-08-29 PROCEDURE — 2500000004 HC RX 250 GENERAL PHARMACY W/ HCPCS (ALT 636 FOR OP/ED): Performed by: INTERNAL MEDICINE

## 2025-08-29 PROCEDURE — 96523 IRRIG DRUG DELIVERY DEVICE: CPT

## 2025-08-29 RX ORDER — HEPARIN SODIUM,PORCINE/PF 10 UNIT/ML
50 SYRINGE (ML) INTRAVENOUS AS NEEDED
OUTPATIENT
Start: 2025-08-29

## 2025-08-29 RX ORDER — HEPARIN 100 UNIT/ML
500 SYRINGE INTRAVENOUS AS NEEDED
OUTPATIENT
Start: 2025-08-29

## 2025-08-29 RX ORDER — HEPARIN 100 UNIT/ML
500 SYRINGE INTRAVENOUS AS NEEDED
Status: DISCONTINUED | OUTPATIENT
Start: 2025-08-29 | End: 2025-08-29 | Stop reason: HOSPADM

## 2025-08-29 RX ADMIN — HEPARIN 500 UNITS: 100 SYRINGE at 14:42

## 2025-09-16 ENCOUNTER — APPOINTMENT (OUTPATIENT)
Dept: HEMATOLOGY/ONCOLOGY | Facility: HOSPITAL | Age: 60
End: 2025-09-16
Payer: MEDICARE